# Patient Record
Sex: MALE | Race: WHITE | NOT HISPANIC OR LATINO | Employment: OTHER | URBAN - METROPOLITAN AREA
[De-identification: names, ages, dates, MRNs, and addresses within clinical notes are randomized per-mention and may not be internally consistent; named-entity substitution may affect disease eponyms.]

---

## 2015-10-28 LAB
ALBUMIN SERPL-MCNC: NORMAL G/DL
ANION GAP SERPL CALCULATED.3IONS-SCNC: NORMAL MMOL/L
BUN SERPL-MCNC: NORMAL MG/DL
CALCIUM SERPL-MCNC: NORMAL MG/DL
CHLORIDE SERPLBLD-SCNC: NORMAL MMOL/L
CO2 SERPL-SCNC: NORMAL MMOL/L
CREAT SERPL-MCNC: 0.84 MG/DL
GLUCOSE SERPL-MCNC: NORMAL MG/DL (ref 70–99)
PHOSPHATE SERPL-MCNC: NORMAL MG/DL
POTASSIUM SERPL-SCNC: NORMAL MMOL/L
SODIUM SERPL-SCNC: NORMAL MMOL/L

## 2016-01-25 LAB
CHOLEST SERPL-MCNC: 256 MG/DL
HBA1C MFR BLD: 7.3 % (ref 0–5.7)
HDLC SERPL-MCNC: 54 MG/DL
LDLC SERPL CALC-MCNC: 165 MG/DL
NONHDLC SERPL-MCNC: NORMAL MG/DL
PSA SERPL-MCNC: 1.12 NG/ML
TRIGL SERPL-MCNC: 184 MG/DL

## 2016-08-01 LAB
ALBUMIN SERPL-MCNC: 4 G/DL
ALP SERPL-CCNC: 66 U/L
ALT SERPL-CCNC: 18 U/L
ANION GAP SERPL CALCULATED.3IONS-SCNC: 8 MMOL/L
AST SERPL-CCNC: 16 U/L
BILIRUB SERPL-MCNC: 0.5 MG/DL
BILIRUBIN DIRECT: NORMAL MG/DL
BUN SERPL-MCNC: 12 MG/DL
CALCIUM SERPL-MCNC: 9.2 MG/DL
CHLORIDE SERPLBLD-SCNC: 100 MMOL/L
CO2 SERPL-SCNC: 29 MMOL/L
CREAT SERPL-MCNC: 0.82 MG/DL
ERYTHROCYTE [DISTWIDTH] IN BLOOD BY AUTOMATED COUNT: 12.7 %
ERYTHROCYTE [DISTWIDTH] IN BLOOD BY AUTOMATED COUNT: 12.7 %
GFR SERPL CREATININE-BSD FRML MDRD: >60 ML/MIN/1.73M2
GLUCOSE SERPL-MCNC: 191 MG/DL (ref 70–99)
HBA1C MFR BLD: 7.9 % (ref 0–5.7)
HCT VFR BLD AUTO: 40.7 %
HCT VFR BLD AUTO: 40.7 %
HEMOGLOBIN: 13.5 G/DL (ref 13.3–17.7)
HEMOGLOBIN: 13.5 G/DL (ref 13.3–17.7)
MCH RBC QN AUTO: 31.6 PG
MCH RBC QN AUTO: 31.6 PG
MCHC RBC AUTO-ENTMCNC: 33.2 G/DL
MCHC RBC AUTO-ENTMCNC: 33.2 G/DL
MCV RBC AUTO: 95 FL
MCV RBC AUTO: 95 FL
PLATELET # BLD AUTO: 224 10^9/L
PLATELET # BLD AUTO: 224 10^9/L
POTASSIUM SERPL-SCNC: 5 MMOL/L
PROT SERPL-MCNC: 7 G/DL
RBC # BLD AUTO: 4.27 10^12/L
RBC # BLD AUTO: 4.27 10^12/L
SODIUM SERPL-SCNC: 137 MMOL/L
WBC # BLD AUTO: 7.3 10^9/L
WBC # BLD AUTO: 7.3 10^9/L

## 2017-01-05 ENCOUNTER — TRANSFERRED RECORDS (OUTPATIENT)
Dept: CARDIOLOGY | Facility: CLINIC | Age: 72
End: 2017-01-05

## 2017-01-10 ENCOUNTER — TRANSFERRED RECORDS (OUTPATIENT)
Dept: CARDIOLOGY | Facility: CLINIC | Age: 72
End: 2017-01-10

## 2017-01-12 ENCOUNTER — MEDICAL CORRESPONDENCE (OUTPATIENT)
Dept: CARDIOLOGY | Facility: CLINIC | Age: 72
End: 2017-01-12

## 2017-01-12 ENCOUNTER — TRANSFERRED RECORDS (OUTPATIENT)
Dept: CARDIOLOGY | Facility: CLINIC | Age: 72
End: 2017-01-12

## 2017-01-13 ENCOUNTER — TELEPHONE (OUTPATIENT)
Dept: CARDIOLOGY | Facility: CLINIC | Age: 72
End: 2017-01-13

## 2017-01-13 NOTE — TELEPHONE ENCOUNTER
Pc from pt's wife that pt saw neurologist yest, Dr Hermes Schwartz [phone] and pt had symptoms of a stroke with visual impairment. The neurologist recommended that pt be seen by cardiology in next 7-10 to see if it was possibly due to a Fib. Wife states her  feels fine and his pulse does not feel irreg. She states she sees Dr Zuniga and was wondering if he had an openings. Transferred call to scheduling to arrange new pt appt for poss a fib. Wife expressed understanding. All questions answered

## 2017-01-17 ENCOUNTER — TELEPHONE (OUTPATIENT)
Dept: CARDIOLOGY | Facility: CLINIC | Age: 72
End: 2017-01-17

## 2017-01-18 ENCOUNTER — HOSPITAL ENCOUNTER (OUTPATIENT)
Dept: CARDIOLOGY | Facility: CLINIC | Age: 72
Discharge: HOME | End: 2017-01-18
Attending: INTERNAL MEDICINE | Admitting: INTERNAL MEDICINE
Payer: MEDICARE

## 2017-01-18 ENCOUNTER — OFFICE VISIT (OUTPATIENT)
Dept: CARDIOLOGY | Facility: CLINIC | Age: 72
End: 2017-01-18
Payer: COMMERCIAL

## 2017-01-18 ENCOUNTER — DOCUMENTATION ONLY (OUTPATIENT)
Dept: CARDIOLOGY | Facility: CLINIC | Age: 72
End: 2017-01-18

## 2017-01-18 VITALS
HEART RATE: 72 BPM | BODY MASS INDEX: 41.75 KG/M2 | WEIGHT: 315 LBS | DIASTOLIC BLOOD PRESSURE: 70 MMHG | HEIGHT: 73 IN | SYSTOLIC BLOOD PRESSURE: 158 MMHG

## 2017-01-18 DIAGNOSIS — I48.91 ATRIAL FIBRILLATION, UNSPECIFIED TYPE (H): Primary | ICD-10-CM

## 2017-01-18 DIAGNOSIS — I67.81 ACUTE CEREBROVASCULAR INSUFFICIENCY: ICD-10-CM

## 2017-01-18 DIAGNOSIS — I48.91 ATRIAL FIBRILLATION, UNSPECIFIED TYPE (H): ICD-10-CM

## 2017-01-18 PROCEDURE — 0296T ZIO PATCH HOLTER: CPT

## 2017-01-18 PROCEDURE — 0298T ZZC EXT ECG > 48HR TO 21 DAY REVIEW AND INTERPRETATN: CPT | Performed by: INTERNAL MEDICINE

## 2017-01-18 PROCEDURE — 93005 ELECTROCARDIOGRAM TRACING: CPT | Performed by: INTERNAL MEDICINE

## 2017-01-18 PROCEDURE — 99203 OFFICE O/P NEW LOW 30 MIN: CPT | Mod: 25 | Performed by: INTERNAL MEDICINE

## 2017-01-18 RX ORDER — METFORMIN HYDROCHLORIDE 750 MG/1
750 TABLET, EXTENDED RELEASE ORAL 2 TIMES DAILY WITH MEALS
COMMUNITY
End: 2019-01-28 | Stop reason: DRUGHIGH

## 2017-01-18 NOTE — PROGRESS NOTES
2017             Kemar Collins MD    [facility]   [address] [place] [address]       RE:    Jovi Aldana   MRN:  [id number]   :  1945      Dear Dr. Collins:      I had the pleasure to follow up with your patient, Mr. Jovi Aldana, in the Cardiovascular Medicine Clinic.  I am his wife's cardiologist, and he presents to establish local cardiac care.        As you recall, this is a 71-year-old gentleman with a longstanding history of hypertension, hyperlipidemia, venous insufficiency, sleep apnea, diabetes mellitus and obesity.  This gentleman was in his usual state of health until recently.  He had difficulty reading a computer screen and ultimately sought medical attention.  He went to an optometrist and his prescription for his eye glasses was normal.  He eventually saw a neurologist and testing had revealed unfortunately an acute small late acute infarct in the medial left temporal lobe involving the left hippocampus.  The neurologist had felt that this was responsible for his visual symptoms.      The patient is on apparently monotherapy for his blood pressure.  This consists of lisinopril 40 mg daily.  He has had a normal readings on his blood pressure in the 130s range for systolic when I review his clinic visits in the [facility] system.  He states that he has never gotten the number that was obtained in clinic today.  His blood pressure, by our measurement, was 210/86.  He is in the process of having this rechecked at the time of dictation.        An EKG is performed demonstrating sinus rhythm, right bundle branch block, no acute changes were noted.  His additional cardiac workup includes a nuclear myocardial perfusion study which was performed in [facility] system in 2015 demonstrating normal LV systolic function, however, no evidence of ischemia.  In addition to this, he has had workup with MRA of his neck and cerebral vasculature and no significant findings were  commented upon per report.      His neurologist had mentioned that he could have occult atrial fibrillation.  The patient has never felt any palpitations or any other symptoms and he presents to establish care and also to assess for any occult atrial fibrillation that may have occurred being responsible for his symptoms.  Also of note, the patient does have hyperlipidemia in the background.  He has tried 4 different statins and he unfortunately has had severe muscle aches and has been intolerant to all of them.      PAST MEDICAL HISTORY:   1.  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   2.  Normal myocardial perfusion study in 08/2015.   3.  Obesity.   4.  Sleep apnea.   5.  Venous insufficiency.   6.  Chronic lower extremity edema.   7.  Diabetes mellitus.   8.  Hyperlipidemia.      CURRENT MEDICATIONS CARDIAC FOCUS:   1.  Lasix 20 mg daily.   2.  Lisinopril 40 mg daily.   3.  Aspirin 81 mg daily.   4.  Niacin 250 mg with breakfast.   Please see additional noncardiac medications.      ALLERGIES:  Augmentin, statins, erythromycin and sulfa drugs.        SOCIAL HISTORY:  Retired, lives with his wife, lifelong nonsmoker, no alcohol intake.      FAMILY HISTORY:  Negative for premature coronary artery disease.      REVIEW OF SYSTEMS:  A 10-point review of systems is negative, otherwise as specified in the HPI.      PHYSICAL EXAMINATION:   VITAL SIGNS:  Blood pressure is 158/70 on repeat; the first blood pressure was 210/85.  Heart rate is 72.  Weight 368 pounds.   GENERAL:  The patient is alert, oriented, in no apparent distress.   HEENT:  Oropharynx clear, no sinus tenderness.   NECK:  No JVP, no carotid bruits.   CARDIOVASCULAR:  Distant heart tones.  Normal S1, S2.   LUNGS:  Coarse but clear.   ABDOMEN:  Soft, nontender, nondistended.   EXTREMITIES:  Warm to touch.      ASSESSMENT:   1.  Recent acute CVA with visual disturbance.   2.  The patient recently saw Neurology.  They  have asked to evaluate for possible atrial fibrillation.   3.  Hypertension.   4.  Obesity.   5.  Diabetes mellitus.      RECOMMENDATIONS:   1.  Recent acute cerebrovascular accident.  We agree with aspirin, antiplatelet therapy as well as aggressive blood pressure control.  His blood pressure has improved; however, not optimal.  He is on monotherapy currently.  I have asked him to purchase a blood pressure machine and provide us with additional data so that if need be, we can add an additional hypertensive medications.  We will also get an echocardiogram to assess for LV systolic function and valvular heart disease.  In addition to this, we will order a ZIO Patch monitor to assess for any atrial fibrillation at the request of his neurologist.   2.  Hyperlipidemia.  The patient has been intolerant to many statins.  We will get updated numbers.  He may be a candidate for a newer injectable form of hyperlipidemic therapy because controlling his cholesterol would be important given his comorbidities.  We will get updated numbers upon his next visit.   3.  The patient will purchase a blood pressure machine.  We will get diagnostic testing performed and have him return to clinic in 1 month's time.      It was a pleasure seeing him on your behalf.      Sincerely,         MD IDANIA Jewell MD             D: 2017 11:01   T: 2017 11:58   MT: [id number]      Name:     SAVI MALDONADO   MRN:      [id number]        Account:      [id number]   :      1945           Service Date: 2017      Document: [id number]

## 2017-01-18 NOTE — MR AVS SNAPSHOT
After Visit Summary   1/18/2017    Jovi Aldana    MRN: [id number]           Patient Information     Date Of Birth          1945        Visit Information        Provider Department      1/18/2017 10:15 AM Aren Zuniga MD [facility] HEART AT [place]        Today's Diagnoses     Atrial fibrillation, unspecified type (H)    -  1     Acute cerebrovascular insufficiency             Follow-ups after your visit        Additional Services     Follow-Up with Cardiologist                 Your next 10 appointments already scheduled     Jan 18, 2017 12:15 PM   ZIOPATCH MONITOR with RSCC DEVICE [facility])    [address]  [facility] [address]   [phone]           LOCATION - [id number] [place] [address], [address] [place] [address] **Please check-in at the [place] Registration Office, [address], in the [facility] building. When you are finished registering, please go to [address] and have a seat.            Jan 27, 2017  7:30 AM   Ech Complete with RSCCECHO2   [facility] ([facility])    [address]  [facility] [address]   [phone]           1.  Please bring or wear a comfortable two-piece outfit. 2.  You may eat, drink and take your normal medicines. 3.  For any questions that cannot be answered, please contact the ordering physician ***Please check-in at the [place] Registration Office located in [address] in the [facility] building. When you are finished registering, please go to [address] and have a seat. The technician will call your name for the test.            Jan 27, 2017  8:30 AM   LAB with RU LAB   [facility] HEART AT [place] ([facility])    [address]  [facility] [address]   [phone]           Patient must bring picture  ID.  Patient should be prepared to give a urine specimen  Please do not eat 10-12 hours before your appointment if you are coming in fasting for labs on lipids, cholesterol, or glucose (sugar).  Pregnant women should follow their Care Team instructions. Water with medications is okay. Do not drink coffee or other fluids.   If you have concerns about taking  your medications, please ask at office or if scheduling via [employer], send a message by clicking on Secure Messaging, Message Your Care Team.            Feb 06, 2017  2:15 PM   Return Visit with Aren Zuniga MD   [facility] AT [place] ([facility])    [address] [place] [address]   [phone]              Future tests that were ordered for you today     Open Future Orders        Priority Expected Expires Ordered    Lipid Profile Routine 2/17/2017 1/18/2018 1/18/2017    ALT Routine 2/17/2017 1/18/2018 1/18/2017    Basic metabolic panel Routine 2/17/2017 1/18/2018 1/18/2017    Echocardiogram Routine 2/17/2017 1/18/2018 1/18/2017    Follow-Up with Cardiologist Routine 2/17/2017 1/18/2018 1/18/2017    Zio Patch Monitor Routine 1/18/2017 1/18/2018 1/18/2017            Who to contact     If you have questions or need follow up information about today's clinic visit or your schedule please contact [facility] AT [place] directly at [phone].  Normal or non-critical lab and imaging results will be communicated to you by MyChart, letter or phone within 4 business days after the clinic has received the results. If you do not hear from us within 7 days, please contact the clinic through MyChart or phone. If you have a critical or abnormal lab result, we will notify you by phone as soon as possible.  Submit refill requests through [employer] or call your pharmacy and they will forward the refill request to us. Please allow 3 business days for your refill to be completed.  "         Additional Information About Your Visit        Care EveryWhere ID     This is your Care EveryWhere ID. This could be used by other organizations to access your [place] medical records  [id number]        Your Vitals Were     Pulse Height BMI (Body Mass Index)             72 1.854 m (6' 1\") 48.56 kg/m2          Blood Pressure from Last 3 Encounters:   01/18/17 158/70   06/12/13 185/83   05/15/12 139/68    Weight from Last 3 Encounters:   01/18/17 166.924 kg (368 lb)   06/12/13 185.068 kg (408 lb)   05/15/12 180.532 kg (398 lb)              We Performed the Following     EKG 12-lead complete w/read - Clinics (performed today)        Primary Care Provider Office Phone # Fax #    Kemar Collins [phone]       [address] [address]        Thank you!     Thank you for choosing [facility] AT [place]  for your care. Our goal is always to provide you with excellent care. Hearing back from our patients is one way we can continue to improve our services. Please take a few minutes to complete the written survey that you may receive in the mail after your visit with us. Thank you!             Your Updated Medication List - Protect others around you: Learn how to safely use, store and throw away your medicines at www.disposemymeds.org.          This list is accurate as of: 1/18/17 11:43 AM.  Always use your most recent med list.                   Brand Name Dispense Instructions for use    ALLEGRA 180 MG tablet   Generic drug:  fexofenadine      Take 1 tablet by mouth daily.       clobetasol 0.05 % ointment    TEMOVATE     Apply  topically 2 times daily.       DESONIL CREAM EX      Externally apply 0.05 oz topically. Taro       furosemide 20 MG tablet    LASIX     Take 20 mg by mouth daily.       GABAPENTIN PO      Take  by mouth. 600mg am/ 300mg lunch and 600mg bedtime       JANUVIA 100 MG tablet   Generic drug:  " sitagliptin      Take 100 mg by mouth       lisinopril 20 MG tablet    PRINIVIL/ZESTRIL     Take 40 mg by mouth daily       LOW-DOSE ASPIRIN PO      Take 1 tablet by mouth daily.       magnesium 250 MG tablet      Take 1 tablet by mouth daily.       metFORMIN 750 MG 24 hr tablet    GLUCOPHAGE-XR     Take 750 mg by mouth 2 times daily (with meals)       niacin 250 MG tablet      Take 1 tablet by mouth daily (with breakfast).       OMEGA-3 FISH OIL PO      Take 1 tablet by mouth daily.       potassium 99 MG Tabs      Take 1 tablet by mouth daily.       PSYLLIUM HUSK      500 mg daily.       TEGRETOL XR PO      Take  by mouth. 200mg am/ 100 mg for supper and bedtime       TMC      .25 %Menthol / 0.5 % pheno       UNABLE TO FIND      2 times daily. [place] 1:2       VITAMIN B-12 PO      Take  by mouth.       * vitamin E 400 UNITS Tabs      Take 1 tablet by mouth daily.       * VITAMIN E PO      Take 4,000 Units by mouth daily.       * Notice:  This list has 2 medication(s) that are the same as other medications prescribed for you. Read the directions carefully, and ask your doctor or other care provider to review them with you.

## 2017-01-18 NOTE — TELEPHONE ENCOUNTER
Records received from [facility].  Labs enterd and Record sent to HIM for scanning.

## 2017-01-18 NOTE — PROGRESS NOTES
Note dictated.  He will buy BP machine and keep diary.  Recheck BP better, but need tighter control.      Aren Zuniga MD

## 2017-01-27 ENCOUNTER — HOSPITAL ENCOUNTER (OUTPATIENT)
Dept: CARDIOLOGY | Facility: CLINIC | Age: 72
Discharge: HOME | End: 2017-01-27
Attending: INTERNAL MEDICINE | Admitting: INTERNAL MEDICINE
Payer: MEDICARE

## 2017-01-27 DIAGNOSIS — I48.91 ATRIAL FIBRILLATION, UNSPECIFIED TYPE (H): ICD-10-CM

## 2017-01-27 PROCEDURE — 25500064 ZZH RX 255 OP 636: Performed by: INTERNAL MEDICINE

## 2017-01-27 PROCEDURE — 93306 TTE W/DOPPLER COMPLETE: CPT | Mod: 26 | Performed by: INTERNAL MEDICINE

## 2017-01-27 PROCEDURE — 40000264 ECHO COMPLETE WITH OPTISON

## 2017-01-27 RX ADMIN — HUMAN ALBUMIN MICROSPHERES AND PERFLUTREN 3 ML: 10; .22 INJECTION, SOLUTION INTRAVENOUS at 08:45

## 2017-01-30 ENCOUNTER — DOCUMENTATION ONLY (OUTPATIENT)
Dept: CARDIOLOGY | Facility: CLINIC | Age: 72
End: 2017-01-30

## 2017-01-30 DIAGNOSIS — I10 ESSENTIAL HYPERTENSION: ICD-10-CM

## 2017-01-30 DIAGNOSIS — I10 ESSENTIAL HYPERTENSION: Primary | ICD-10-CM

## 2017-01-30 LAB
ALT SERPL W P-5'-P-CCNC: 25 U/L (ref 0–70)
ANION GAP SERPL CALCULATED.3IONS-SCNC: 5 MMOL/L (ref 3–14)
BUN SERPL-MCNC: 12 MG/DL (ref 7–30)
CALCIUM SERPL-MCNC: 8.6 MG/DL (ref 8.5–10.1)
CHLORIDE SERPL-SCNC: 100 MMOL/L (ref 94–109)
CHOLEST SERPL-MCNC: 270 MG/DL
CO2 SERPL-SCNC: 32 MMOL/L (ref 20–32)
CREAT SERPL-MCNC: 0.71 MG/DL (ref 0.66–1.25)
GFR SERPL CREATININE-BSD FRML MDRD: ABNORMAL ML/MIN/1.7M2
GLUCOSE SERPL-MCNC: 160 MG/DL (ref 70–99)
HDLC SERPL-MCNC: 56 MG/DL
LDLC SERPL CALC-MCNC: 184 MG/DL
NONHDLC SERPL-MCNC: 214 MG/DL
POTASSIUM SERPL-SCNC: 4.5 MMOL/L (ref 3.4–5.3)
SODIUM SERPL-SCNC: 137 MMOL/L (ref 133–144)
TRIGL SERPL-MCNC: 150 MG/DL

## 2017-01-30 PROCEDURE — 36415 COLL VENOUS BLD VENIPUNCTURE: CPT | Performed by: INTERNAL MEDICINE

## 2017-01-30 PROCEDURE — 80061 LIPID PANEL: CPT | Performed by: INTERNAL MEDICINE

## 2017-01-30 PROCEDURE — 84460 ALANINE AMINO (ALT) (SGPT): CPT | Performed by: INTERNAL MEDICINE

## 2017-01-30 PROCEDURE — 80048 BASIC METABOLIC PNL TOTAL CA: CPT | Performed by: INTERNAL MEDICINE

## 2017-01-30 NOTE — PROGRESS NOTES
Ordered entered for BMP, Lipids/alt.  Pt here in clinic on 1/27 for echo and labs.  Labs drawn off IV or echo.  Specimen hemolyzed.  Pt notified and will return to clinic for draw.  Pt has f/u OV with Dr. Zuniga on 2/6/2017.   KMortimer RN

## 2017-02-06 ENCOUNTER — OFFICE VISIT (OUTPATIENT)
Dept: CARDIOLOGY | Facility: CLINIC | Age: 72
End: 2017-02-06
Attending: INTERNAL MEDICINE
Payer: COMMERCIAL

## 2017-02-06 ENCOUNTER — CARE COORDINATION (OUTPATIENT)
Dept: CARDIOLOGY | Facility: CLINIC | Age: 72
End: 2017-02-06

## 2017-02-06 VITALS
BODY MASS INDEX: 41.75 KG/M2 | HEIGHT: 73 IN | WEIGHT: 315 LBS | HEART RATE: 80 BPM | SYSTOLIC BLOOD PRESSURE: 174 MMHG | DIASTOLIC BLOOD PRESSURE: 78 MMHG

## 2017-02-06 DIAGNOSIS — I48.91 ATRIAL FIBRILLATION, UNSPECIFIED TYPE (H): ICD-10-CM

## 2017-02-06 DIAGNOSIS — I11.9 HYPERTENSIVE HEART DISEASE WITHOUT HEART FAILURE: ICD-10-CM

## 2017-02-06 DIAGNOSIS — I25.10 CORONARY ARTERY DISEASE INVOLVING NATIVE CORONARY ARTERY OF NATIVE HEART WITHOUT ANGINA PECTORIS: ICD-10-CM

## 2017-02-06 DIAGNOSIS — I10 ESSENTIAL HYPERTENSION: ICD-10-CM

## 2017-02-06 DIAGNOSIS — E78.2 MIXED HYPERLIPIDEMIA: Primary | ICD-10-CM

## 2017-02-06 PROCEDURE — 99214 OFFICE O/P EST MOD 30 MIN: CPT | Performed by: INTERNAL MEDICINE

## 2017-02-06 RX ORDER — CARBAMAZEPINE 200 MG/1
200 TABLET ORAL 3 TIMES DAILY
COMMUNITY
End: 2020-05-05

## 2017-02-06 RX ORDER — GABAPENTIN 300 MG/1
300 CAPSULE ORAL 4 TIMES DAILY
COMMUNITY
End: 2020-10-08

## 2017-02-06 NOTE — PROGRESS NOTES
Educated patient and his wife on PCSK-9 inhibitor (Repatha) for dx of hyperlipidemia. Last Lipid panel on 1/30/17 revealed LDL level of 184. I discussed the difference between statins and PCSK-9 inhibtor and how it can help prevent a future cardiovascular event, method (sure click injection) and how often it is administered, cost and the most common side effect. Pt was concerned about co-pay cost once medication is approved, I discussed the [employer] and [employer] for financial assistance.  All questions were answered. Pt was given educational pamphlets. Pt said he would like to read the material before making up his mind. I told him I would touch base with him next week. I gave his wife (Jan) Team 1 nurse line number. Prescription was sent to  Specialty pharmacy, will await response of approval or denial.

## 2017-02-06 NOTE — PROGRESS NOTES
Cardiology     I had the pleasure to follow up with your patient, Mr. Jovi Aldaan, in the Cardiovascular Medicine Clinic.  I am his wife's cardiologist, and he presents to establish local cardiac care.        As you recall, this is a 71-year-old gentleman with a longstanding history of hypertension, hyperlipidemia, venous insufficiency, sleep apnea, diabetes mellitus and obesity.  This gentleman was in his usual state of health until recently.  He had difficulty reading a computer screen and ultimately sought medical attention.  He went to an optometrist and his prescription for his eye glasses was normal.  He eventually saw a neurologist and testing had revealed unfortunately an acute small late acute infarct in the medial left temporal lobe involving the left hippocampus.  The neurologist had felt that this was responsible for his visual symptoms.      The patient is on apparently monotherapy for his blood pressure.  This consists of lisinopril 40 mg daily.  He has had a normal readings on his blood pressure in the 130s range for systolic when I review his clinic visits in the [employer] system.  He states that he has never gotten the number that was obtained in clinic today.  His blood pressure, by our measurement, was 210/86.  He is in the process of having this rechecked at the time of dictation.        An EKG is performed demonstrating sinus rhythm, right bundle branch block, no acute changes were noted.  His additional cardiac workup includes a nuclear myocardial perfusion study which was performed in [employer] system in 08/2015 demonstrating normal LV systolic function, however, no evidence of ischemia.  In addition to this, he has had workup with MRA of his neck and cerebral vasculature and no significant findings were commented upon per report.      His neurologist had mentioned that he could have occult atrial fibrillation.  The patient has never felt any palpitations or any other symptoms and he presents to  "establish care and also to assess for any occult atrial fibrillation that may have occurred being responsible for his symptoms.  Also of note, the patient does have hyperlipidemia in the background.  He has tried 4 different statins and he unfortunately has had severe muscle aches and has been intolerant to all of them.      ZIO patch was normal    Echo normal        PAST MEDICAL HISTORY:   1.  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   2.  Normal myocardial perfusion study in 08/2015.   3.  Obesity.   4.  Sleep apnea.   5.  Venous insufficiency.   6.  Chronic lower extremity edema.   7.  Diabetes mellitus.   8.  Hyperlipidemia.      CURRENT MEDICATIONS CARDIAC FOCUS:   1.  Lasix 20 mg daily.   2.  Lisinopril 40 mg daily.   3.  Aspirin 81 mg daily.   4.  Niacin 250 mg with breakfast.   Please see additional noncardiac medications.      ALLERGIES:  Augmentin, statins, erythromycin and sulfa drugs.        PHYSICAL EXAMINATION:   VITAL SIGNS:  Blood pressure 174/78, pulse 80, height 1.854 m (6' 1\"), weight 169.101 kg (372 lb 12.8 oz).  GENERAL:  The patient is alert, oriented, in no apparent distress.   HEENT:  Oropharynx clear, no sinus tenderness.   NECK:  No JVP, no carotid bruits.   CARDIOVASCULAR:  Distant heart tones.  Normal S1, S2.   LUNGS:  Coarse but clear.   ABDOMEN:  Soft, nontender, nondistended.   EXTREMITIES:  Warm to touch.      ASSESSMENT:   1.  Recent acute CVA with visual disturbance.   2.  The patient recently saw Neurology.  They have asked to evaluate for possible atrial fibrillation.   3.  Hypertension. Possible white coat  4.  Obesity.   5.  Diabetes mellitus.      RECOMMENDATIONS:   1.  Recent acute cerebrovascular accident.  We agree with aspirin, antiplatelet therapy as well as aggressive blood pressure control.  BP at home ~ 140`s systolic.  His BP machine is similar to ours.  Suggests possible white coat syndrome.    2.  Hyperlipidemia.  The patient " has been intolerant to many statins.  We will try Repatha and recheck numbers in ~ 6 months.    3.  RTC ~ 6 months.        Aren Zuniga MD

## 2017-02-06 NOTE — Clinical Note
2/6/2017    Kemar Collins MD  [facility]   [address] [address]    RE: Jovi Aldana     Dear Colleague,    I had the pleasure of seeing Jovi Aldana in the [facility].  Cardiology     I had the pleasure to follow up with your patient, Mr. Jovi Aldana, in the Cardiovascular Medicine Clinic.  I am his wife's cardiologist, and he presents to establish local cardiac care.        As you recall, this is a 71-year-old gentleman with a longstanding history of hypertension, hyperlipidemia, venous insufficiency, sleep apnea, diabetes mellitus and obesity.  This gentleman was in his usual state of health until recently.  He had difficulty reading a computer screen and ultimately sought medical attention.  He went to an optometrist and his prescription for his eye glasses was normal.  He eventually saw a neurologist and testing had revealed unfortunately an acute small late acute infarct in the medial left temporal lobe involving the left hippocampus.  The neurologist had felt that this was responsible for his visual symptoms.      The patient is on apparently monotherapy for his blood pressure.  This consists of lisinopril 40 mg daily.  He has had a normal readings on his blood pressure in the 130s range for systolic when I review his clinic visits in the [employer] system.  He states that he has never gotten the number that was obtained in clinic today.  His blood pressure, by our measurement, was 210/86.  He is in the process of having this rechecked at the time of dictation.        An EKG is performed demonstrating sinus rhythm, right bundle branch block, no acute changes were noted.  His additional cardiac workup includes a nuclear myocardial perfusion study which was performed in [facility] in 08/2015 demonstrating normal LV systolic function, however, no evidence of ischemia.  In addition to this, he has had workup with MRA of his  "neck and cerebral vasculature and no significant findings were commented upon per report.      His neurologist had mentioned that he could have occult atrial fibrillation.  The patient has never felt any palpitations or any other symptoms and he presents to establish care and also to assess for any occult atrial fibrillation that may have occurred being responsible for his symptoms.    Also of note, the patient does have hyperlipidemia in the background.  He has tried 4 different statins and he unfortunately has had severe muscle aches and has been intolerant to all of them.      ZIO patch was normal    Echo normal    PAST MEDICAL HISTORY:   1.  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   2.  Normal myocardial perfusion study in 08/2015.   3.  Obesity.   4.  Sleep apnea.   5.  Venous insufficiency.   6.  Chronic lower extremity edema.   7.  Diabetes mellitus.   8.  Hyperlipidemia.      CURRENT MEDICATIONS CARDIAC FOCUS:   1.  Lasix 20 mg daily.   2.  Lisinopril 40 mg daily.   3.  Aspirin 81 mg daily.   4.  Niacin 250 mg with breakfast.   Please see additional noncardiac medications.      ALLERGIES:  Augmentin, statins, erythromycin and sulfa drugs.        PHYSICAL EXAMINATION:   VITAL SIGNS:  Blood pressure 174/78, pulse 80, height 1.854 m (6' 1\"), weight 169.101 kg (372 lb 12.8 oz).  GENERAL:  The patient is alert, oriented, in no apparent distress.   HEENT:  Oropharynx clear, no sinus tenderness.   NECK:  No JVP, no carotid bruits.   CARDIOVASCULAR:  Distant heart tones.  Normal S1, S2.   LUNGS:  Coarse but clear.   ABDOMEN:  Soft, nontender, nondistended.   EXTREMITIES:  Warm to touch.      ASSESSMENT:   1.  Recent acute CVA with visual disturbance.   2.  The patient recently saw Neurology.  They have asked to evaluate for possible atrial fibrillation.   3.  Hypertension. Possible white coat  4.  Obesity.   5.  Diabetes mellitus.      RECOMMENDATIONS:   1.  Recent acute " cerebrovascular accident.  We agree with aspirin, antiplatelet therapy as well as aggressive blood pressure control.  BP at home ~ 140`s systolic.  His BP machine is similar to ours.  Suggests possible white coat syndrome.    2.  Hyperlipidemia.  The patient has been intolerant to many statins.  We will try Repatha and recheck numbers in ~ 6 months.    3.  RTC ~ 6 months.    Thank you for allowing me to participate in the care of your patient.    Sincerely,     Aren Zuniga MD     [facility]

## 2017-08-09 ENCOUNTER — OFFICE VISIT (OUTPATIENT)
Dept: CARDIOLOGY | Facility: CLINIC | Age: 72
End: 2017-08-09
Attending: INTERNAL MEDICINE
Payer: COMMERCIAL

## 2017-08-09 VITALS
BODY MASS INDEX: 41.75 KG/M2 | HEART RATE: 84 BPM | HEIGHT: 73 IN | DIASTOLIC BLOOD PRESSURE: 78 MMHG | SYSTOLIC BLOOD PRESSURE: 162 MMHG | WEIGHT: 315 LBS

## 2017-08-09 DIAGNOSIS — I11.9 HYPERTENSIVE HEART DISEASE WITHOUT HEART FAILURE: ICD-10-CM

## 2017-08-09 DIAGNOSIS — I10 BENIGN ESSENTIAL HYPERTENSION: Primary | ICD-10-CM

## 2017-08-09 DIAGNOSIS — I48.91 ATRIAL FIBRILLATION, UNSPECIFIED TYPE (H): ICD-10-CM

## 2017-08-09 LAB
ALT SERPL W P-5'-P-CCNC: 26 U/L (ref 0–70)
CHOLEST SERPL-MCNC: 281 MG/DL
HDLC SERPL-MCNC: 66 MG/DL
LDLC SERPL CALC-MCNC: 178 MG/DL
NONHDLC SERPL-MCNC: 215 MG/DL
TRIGL SERPL-MCNC: 185 MG/DL

## 2017-08-09 PROCEDURE — 99214 OFFICE O/P EST MOD 30 MIN: CPT | Performed by: INTERNAL MEDICINE

## 2017-08-09 PROCEDURE — 36415 COLL VENOUS BLD VENIPUNCTURE: CPT | Performed by: INTERNAL MEDICINE

## 2017-08-09 PROCEDURE — 80061 LIPID PANEL: CPT | Performed by: INTERNAL MEDICINE

## 2017-08-09 PROCEDURE — 84460 ALANINE AMINO (ALT) (SGPT): CPT | Performed by: INTERNAL MEDICINE

## 2017-08-09 RX ORDER — SPIRONOLACTONE 25 MG/1
12.5 TABLET ORAL DAILY
Qty: 45 TABLET | Refills: 3 | Status: SHIPPED | OUTPATIENT
Start: 2017-08-09 | End: 2018-07-27

## 2017-08-09 RX ORDER — LISINOPRIL 40 MG/1
40 TABLET ORAL DAILY
COMMUNITY
End: 2020-06-15

## 2017-08-09 RX ORDER — OMEPRAZOLE 40 MG/1
40 CAPSULE, DELAYED RELEASE ORAL DAILY
COMMUNITY
End: 2018-04-26

## 2017-08-09 NOTE — LETTER
8/9/2017    Kemar Collins MD  Formerly [facility]   [address] [facility] [address]    RE: Jovi Aldana       Dear Colleague,    I had the pleasure of seeing Danieniurka Aldana in the [facility].    Cardiology Progress Note          Assessment and Plan:     1. Hypertension, suboptimal control    Discussed how this can contribute to small vessel brain disease and likelihood of stroke even without atherosclerotic disease.    We'll try a small dose of spironolactone 12.5 mg daily.  He will cut his potassium supplementation in half and get basic metabolic panel checked with his primary in one week.  Continue the furosemide and lisinopril.  Discussed the potential side effects of gynecomastia and tenderness.    Would not use the amlodipine as patient already has symptomatic lower extremity edema.  If he does not tolerate the spironolactone, could consider addition of alpha blockade like Cardura which may allow him to tolerate more furosemide.      2. Brief SVT    No clear evidence of paroxysmal atrial fibrillation, but patient is high risk for developing this in the future.  If he develops future strokes and/or evidence of likely embolic phenomenon, would consider anticoagulation while trying to identify the atrial fibrillation.      3. Obstructive sleep apnea, compliant with CPAP    This note was transcribed using electronic voice recognition software and there may be typographical errors present.                Interval History:   The patient is a pleasant 72 year old whom I'm meeting for the first time along with his wife.  Despite being morbidly obese, he has not had any significant atherosclerotic disease on multiple evaluations including CT coronary angiography.  He is intolerant of all statins.  He could not afford Repatha.  He also could not tolerate Livalo. His symptoms are myalgias.  He has lost about 50 pounds over the past couple years through  "improvement in his diet.  He had a neurologic event with visual field changes that might have been either a small vessel infarct or embolic phenomenon.  Seven-day monitor did not show any atrial fibrillation, just brief SVT.   The patient has had hypertension with no blood pressures under 140 really.  With any stress or activity, his pressures spike up to as high as 200 systolic.  The patient only urinates once per night.  He is compliant with his CPAP.  When he did try a higher strength of furosemide, he did have difficulty with frequent urination, however.                       Review of Systems:   Review of Systems:  Skin:  Positive for     Eyes:  Positive for glasses  ENT:  Positive for sinus trouble  Respiratory:  Positive for dyspnea on exertion;sleep apnea;CPAP  Cardiovascular:    edema;Positive for  Gastroenterology: Positive for reflux  Genitourinary:  Negative    Musculoskeletal:  Positive for arthritis;back pain;joint pain;joint stiffness;foot pain  Neurologic:  Positive for stroke;numbness or tingling of hands;numbness or tingling of feet  Psychiatric:  Positive for anxiety  Heme/Lymph/Imm:  Positive for allergies  Endocrine:  Positive for diabetes              Physical Exam:     Vitals: /78 (BP Location: Right arm, Patient Position: Sitting, Cuff Size: Adult Large)  Pulse 84  Ht 1.854 m (6' 1\")  Wt (!) 166.8 kg (367 lb 12.8 oz)  BMI 48.53 kg/m2  Constitutional:  cooperative, alert and oriented, well developed, well nourished, in no acute distress morbidly obese      Skin:  warm and dry to the touch, no apparent skin lesions or masses noted        Head:  normocephalic, no masses or lesions        Eyes:  pupils equal and round, conjunctivae and lids unremarkable, sclera white, no xanthalasma, EOMS intact, no nystagmus        ENT:  no pallor or cyanosis, dentition good        Neck:  JVP normal        Chest:  normal breath sounds, clear to auscultation, normal A-P diameter, normal symmetry, normal " respiratory excursion, no use of accessory muscles        Cardiac: regular rhythm;no murmurs, gallops or rubs detected   distant heart sounds              Abdomen:    obese      Extremities and Back:  no deformities, clubbing, cyanosis, erythema observed        Neurological:  affect appropriate, oriented to time, person and place;no gross motor deficits                 Medications:     Current Outpatient Prescriptions   Medication Sig Dispense Refill     omeprazole (PRILOSEC) 40 MG capsule Take 40 mg by mouth daily       lisinopril (PRINIVIL/ZESTRIL) 40 MG tablet Take 40 mg by mouth daily       spironolactone (ALDACTONE) 25 MG tablet Take 0.5 tablets (12.5 mg) by mouth daily 45 tablet 3     gabapentin (NEURONTIN) 300 MG capsule Take 300 mg by mouth 4 times daily       carBAMazepine (TEGRETOL) 200 MG tablet Take 200 mg by mouth 3 times daily       aspirin 81 MG tablet Take 81 mg by mouth daily       metFORMIN (GLUCOPHAGE-XR) 750 MG 24 hr tablet Take 750 mg by mouth 2 times daily (with meals)       sitagliptin (JANUVIA) 100 MG tablet Take 100 mg by mouth       Furosemide (LASIX) 20 MG tablet Take 20 mg by mouth daily.       fexofenadine (ALLEGRA) 180 MG tablet Take 1 tablet by mouth daily.       vitamin E 400 UNITS TABS Take 1 tablet by mouth daily.       potassium 99 MG TABS Take 1 tablet by mouth daily.       niacin 250 MG tablet Take 1 tablet by mouth daily (with breakfast).       magnesium 250 MG tablet Take 1 tablet by mouth daily.       PSYLLIUM HUSK 500 mg as needed        clobetasol (TEMOVATE) 0.05 % ointment Apply topically as needed        Desonide Crea-Wound Dress Crea (DESONIL CREAM EX) Externally apply 0.05 oz topically as needed Taro        TMC .25 %Menthol / 0.5 % pheno                  Data:   All laboratory data reviewed  Results for orders placed or performed in visit on 08/09/17 (from the past 24 hour(s))   Lipid Profile   Result Value Ref Range    Cholesterol 281 (H) <200 mg/dL    Triglycerides 185  (H) <150 mg/dL    HDL Cholesterol 66 >39 mg/dL    LDL Cholesterol Calculated 178 (H) <100 mg/dL    Non HDL Cholesterol 215 (H) <130 mg/dL   ALT   Result Value Ref Range    ALT 26 0 - 70 U/L       All laboratory data reviewed  Lab Results   Component Value Date    CHOL 281 08/09/2017     Lab Results   Component Value Date    HDL 66 08/09/2017     Lab Results   Component Value Date     08/09/2017     Lab Results   Component Value Date    TRIG 185 08/09/2017     No results found for: CHOLHDLRATIO  TSH   Date Value Ref Range Status   03/01/2013 1.58 0.4 - 5.0 mU/L Final     Last Basic Metabolic Panel:  Lab Results   Component Value Date     01/30/2017      Lab Results   Component Value Date    POTASSIUM 4.5 01/30/2017     Lab Results   Component Value Date    CHLORIDE 100 01/30/2017     Lab Results   Component Value Date    RULA 8.6 01/30/2017     Lab Results   Component Value Date    CO2 32 01/30/2017     Lab Results   Component Value Date    BUN 12 01/30/2017     Lab Results   Component Value Date    CR 0.71 01/30/2017     Lab Results   Component Value Date     01/30/2017     Lab Results   Component Value Date    WBC 7.3 08/01/2016    WBC 7.3 08/01/2016     Lab Results   Component Value Date    RBC 4.27 08/01/2016    RBC 4.27 08/01/2016     Lab Results   Component Value Date    HGB 13.5 08/01/2016    HGB 13.5 08/01/2016     Lab Results   Component Value Date    HCT 40.7 08/01/2016    HCT 40.7 08/01/2016     Lab Results   Component Value Date    MCV 95 08/01/2016    MCV 95 08/01/2016     Lab Results   Component Value Date    MCH 31.6 08/01/2016    MCH 31.6 08/01/2016     Lab Results   Component Value Date    MCHC 33.2 08/01/2016    MCHC 33.2 08/01/2016     Lab Results   Component Value Date    RDW 12.7 08/01/2016    RDW 12.7 08/01/2016     Lab Results   Component Value Date     08/01/2016     08/01/2016       Thank you for allowing me to participate in the care of your  patient.    Sincerely,     Liang Silverio MD     [facility]

## 2017-08-09 NOTE — PROGRESS NOTES
Cardiology Progress Note          Assessment and Plan:     1. Hypertension, suboptimal control    Discussed how this can contribute to small vessel brain disease and likelihood of stroke even without atherosclerotic disease.    We'll try a small dose of spironolactone 12.5 mg daily.  He will cut his potassium supplementation in half and get basic metabolic panel checked with his primary in one week.  Continue the furosemide and lisinopril.  Discussed the potential side effects of gynecomastia and tenderness.    Would not use the amlodipine as patient already has symptomatic lower extremity edema.  If he does not tolerate the spironolactone, could consider addition of alpha blockade like Cardura which may allow him to tolerate more furosemide.      2. Brief SVT    No clear evidence of paroxysmal atrial fibrillation, but patient is high risk for developing this in the future.  If he develops future strokes and/or evidence of likely embolic phenomenon, would consider anticoagulation while trying to identify the atrial fibrillation.      3. Obstructive sleep apnea, compliant with CPAP    This note was transcribed using electronic voice recognition software and there may be typographical errors present.                Interval History:   The patient is a pleasant 72 year old whom I'm meeting for the first time along with his wife.  Despite being morbidly obese, he has not had any significant atherosclerotic disease on multiple evaluations including CT coronary angiography.  He is intolerant of all statins.  He could not afford Repatha.  He also could not tolerate Livalo. His symptoms are myalgias.  He has lost about 50 pounds over the past couple years through improvement in his diet.  He had a neurologic event with visual field changes that might have been either a small vessel infarct or embolic phenomenon.  Seven-day monitor did not show any atrial fibrillation, just brief SVT.   The patient has had hypertension with  "no blood pressures under 140 really.  With any stress or activity, his pressures spike up to as high as 200 systolic.  The patient only urinates once per night.  He is compliant with his CPAP.  When he did try a higher strength of furosemide, he did have difficulty with frequent urination, however.                       Review of Systems:   Review of Systems:  Skin:  Positive for     Eyes:  Positive for glasses  ENT:  Positive for sinus trouble  Respiratory:  Positive for dyspnea on exertion;sleep apnea;CPAP  Cardiovascular:    edema;Positive for  Gastroenterology: Positive for reflux  Genitourinary:  Negative    Musculoskeletal:  Positive for arthritis;back pain;joint pain;joint stiffness;foot pain  Neurologic:  Positive for stroke;numbness or tingling of hands;numbness or tingling of feet  Psychiatric:  Positive for anxiety  Heme/Lymph/Imm:  Positive for allergies  Endocrine:  Positive for diabetes              Physical Exam:     Vitals: /78 (BP Location: Right arm, Patient Position: Sitting, Cuff Size: Adult Large)  Pulse 84  Ht 1.854 m (6' 1\")  Wt (!) 166.8 kg (367 lb 12.8 oz)  BMI 48.53 kg/m2  Constitutional:  cooperative, alert and oriented, well developed, well nourished, in no acute distress morbidly obese      Skin:  warm and dry to the touch, no apparent skin lesions or masses noted        Head:  normocephalic, no masses or lesions        Eyes:  pupils equal and round, conjunctivae and lids unremarkable, sclera white, no xanthalasma, EOMS intact, no nystagmus        ENT:  no pallor or cyanosis, dentition good        Neck:  JVP normal        Chest:  normal breath sounds, clear to auscultation, normal A-P diameter, normal symmetry, normal respiratory excursion, no use of accessory muscles        Cardiac: regular rhythm;no murmurs, gallops or rubs detected   distant heart sounds              Abdomen:    obese      Extremities and Back:  no deformities, clubbing, cyanosis, erythema observed    "     Neurological:  affect appropriate, oriented to time, person and place;no gross motor deficits                 Medications:     Current Outpatient Prescriptions   Medication Sig Dispense Refill     omeprazole (PRILOSEC) 40 MG capsule Take 40 mg by mouth daily       lisinopril (PRINIVIL/ZESTRIL) 40 MG tablet Take 40 mg by mouth daily       spironolactone (ALDACTONE) 25 MG tablet Take 0.5 tablets (12.5 mg) by mouth daily 45 tablet 3     gabapentin (NEURONTIN) 300 MG capsule Take 300 mg by mouth 4 times daily       carBAMazepine (TEGRETOL) 200 MG tablet Take 200 mg by mouth 3 times daily       aspirin 81 MG tablet Take 81 mg by mouth daily       metFORMIN (GLUCOPHAGE-XR) 750 MG 24 hr tablet Take 750 mg by mouth 2 times daily (with meals)       sitagliptin (JANUVIA) 100 MG tablet Take 100 mg by mouth       Furosemide (LASIX) 20 MG tablet Take 20 mg by mouth daily.       fexofenadine (ALLEGRA) 180 MG tablet Take 1 tablet by mouth daily.       vitamin E 400 UNITS TABS Take 1 tablet by mouth daily.       potassium 99 MG TABS Take 1 tablet by mouth daily.       niacin 250 MG tablet Take 1 tablet by mouth daily (with breakfast).       magnesium 250 MG tablet Take 1 tablet by mouth daily.       PSYLLIUM HUSK 500 mg as needed        clobetasol (TEMOVATE) 0.05 % ointment Apply topically as needed        Desonide Crea-Wound Dress Crea (DESONIL CREAM EX) Externally apply 0.05 oz topically as needed Taro        TMC .25 %Menthol / 0.5 % pheno                  Data:   All laboratory data reviewed  Results for orders placed or performed in visit on 08/09/17 (from the past 24 hour(s))   Lipid Profile   Result Value Ref Range    Cholesterol 281 (H) <200 mg/dL    Triglycerides 185 (H) <150 mg/dL    HDL Cholesterol 66 >39 mg/dL    LDL Cholesterol Calculated 178 (H) <100 mg/dL    Non HDL Cholesterol 215 (H) <130 mg/dL   ALT   Result Value Ref Range    ALT 26 0 - 70 U/L       All laboratory data reviewed  Lab Results   Component Value  Date    CHOL 281 08/09/2017     Lab Results   Component Value Date    HDL 66 08/09/2017     Lab Results   Component Value Date     08/09/2017     Lab Results   Component Value Date    TRIG 185 08/09/2017     No results found for: CHOLHDLRATIO  TSH   Date Value Ref Range Status   03/01/2013 1.58 0.4 - 5.0 mU/L Final     Last Basic Metabolic Panel:  Lab Results   Component Value Date     01/30/2017      Lab Results   Component Value Date    POTASSIUM 4.5 01/30/2017     Lab Results   Component Value Date    CHLORIDE 100 01/30/2017     Lab Results   Component Value Date    RULA 8.6 01/30/2017     Lab Results   Component Value Date    CO2 32 01/30/2017     Lab Results   Component Value Date    BUN 12 01/30/2017     Lab Results   Component Value Date    CR 0.71 01/30/2017     Lab Results   Component Value Date     01/30/2017     Lab Results   Component Value Date    WBC 7.3 08/01/2016    WBC 7.3 08/01/2016     Lab Results   Component Value Date    RBC 4.27 08/01/2016    RBC 4.27 08/01/2016     Lab Results   Component Value Date    HGB 13.5 08/01/2016    HGB 13.5 08/01/2016     Lab Results   Component Value Date    HCT 40.7 08/01/2016    HCT 40.7 08/01/2016     Lab Results   Component Value Date    MCV 95 08/01/2016    MCV 95 08/01/2016     Lab Results   Component Value Date    MCH 31.6 08/01/2016    MCH 31.6 08/01/2016     Lab Results   Component Value Date    MCHC 33.2 08/01/2016    MCHC 33.2 08/01/2016     Lab Results   Component Value Date    RDW 12.7 08/01/2016    RDW 12.7 08/01/2016     Lab Results   Component Value Date     08/01/2016     08/01/2016

## 2017-08-09 NOTE — MR AVS SNAPSHOT
"              After Visit Summary   2017    Jovi Aldana    MRN: [id number]           Patient Information     Date Of Birth          1945        Visit Information        Provider Department      2017 9:15 AM Liang Silverio MD [facility] [place]        Today's Diagnoses     Benign essential hypertension    -  1       Follow-ups after your visit        Who to contact     If you have questions or need follow up information about today's clinic visit or your schedule please contact [facility] directly at [phone].  Normal or non-critical lab and imaging results will be communicated to you by CDNlionhart, letter or phone within 4 business days after the clinic has received the results. If you do not hear from us within 7 days, please contact the clinic through CDNlionhart or phone. If you have a critical or abnormal lab result, we will notify you by phone as soon as possible.  Submit refill requests through [facility] or call your pharmacy and they will forward the refill request to us. Please allow 3 business days for your refill to be completed.          Additional Information About Your Visit        MyChart Information     [facility] lets you send messages to your doctor, view your test results, renew your prescriptions, schedule appointments and more. To sign up, go to [email or web address] . Click on \"Log in\" on the left side of the screen, which will take you to the Welcome page. Then click on \"Sign up Now\" on the right side of the page.     You will be asked to enter the access code listed below, as well as some personal information. Please follow the directions to create your username and password.     Your access code is: [email or web address]  Expires: 2017 12:23 PM     Your access code will  in 90 days. If you need help or a new code, please call your [place] clinic or [phone].        Care EveryWhere ID     " "This is your Care EveryWhere ID. This could be used by other organizations to access your [place] medical records  [id number]        Your Vitals Were     Pulse Height BMI (Body Mass Index)             84 1.854 m (6' 1\") 48.53 kg/m2          Blood Pressure from Last 3 Encounters:   08/09/17 162/78   02/06/17 174/78   01/18/17 158/70    Weight from Last 3 Encounters:   08/09/17 (!) 166.8 kg (367 lb 12.8 oz)   02/06/17 (!) 169.1 kg (372 lb 12.8 oz)   01/18/17 (!) 166.9 kg (368 lb)              We Performed the Following     Follow-Up with Cardiologist          Today's Medication Changes          These changes are accurate as of: 8/9/17 12:23 PM.  If you have any questions, ask your nurse or doctor.               Start taking these medicines.        Dose/Directions    spironolactone 25 MG tablet   Commonly known as:  ALDACTONE   Used for:  Benign essential hypertension   Started by:  Liang Silverio MD        Dose:  12.5 mg   Take 0.5 tablets (12.5 mg) by mouth daily   Quantity:  45 tablet   Refills:  3            Where to get your medicines      These medications were sent to [facility] [address] [place] [address] [id number] [place] [id number]     Phone:  [phone]     spironolactone 25 MG tablet                Primary Care Provider Office Phone # Fax #    Kemar Collins [phone]       Formerly [facility] [id number] [place] [id number]        Equal Access to Services     [facility]: Hadii sue hernandez Sogely, waaxda luqadaha, qaybta kaalmasaman goncalves. So [facility] [phone].    ATENCIÓN: Si habla español, tiene a bobo disposición servicios gratuitos de asistencia lingüística. Llame al [phone].    We comply with applicable federal civil rights laws and [place] laws. We do not discriminate on the basis of race, color, national origin, age, disability sex, sexual orientation or gender " identity.            Thank you!     Thank you for choosing [facility] [place]  for your care. Our goal is always to provide you with excellent care. Hearing back from our patients is one way we can continue to improve our services. Please take a few minutes to complete the written survey that you may receive in the mail after your visit with us. Thank you!             Your Updated Medication List - Protect others around you: Learn how to safely use, store and throw away your medicines at www.disposemymeds.org.          This list is accurate as of: 8/9/17 12:23 PM.  Always use your most recent med list.                   Brand Name Dispense Instructions for use Diagnosis    ALLEGRA 180 MG tablet   Generic drug:  fexofenadine      Take 1 tablet by mouth daily.        aspirin 81 MG tablet      Take 81 mg by mouth daily        clobetasol 0.05 % ointment    TEMOVATE     Apply topically as needed        DESONIL CREAM EX      Externally apply 0.05 oz topically as needed Taro        furosemide 20 MG tablet    LASIX     Take 20 mg by mouth daily.        gabapentin 300 MG capsule    NEURONTIN     Take 300 mg by mouth 4 times daily        JANUVIA 100 MG tablet   Generic drug:  sitagliptin      Take 100 mg by mouth        lisinopril 40 MG tablet    PRINIVIL/ZESTRIL     Take 40 mg by mouth daily        magnesium 250 MG tablet      Take 1 tablet by mouth daily.        metFORMIN 750 MG 24 hr tablet    GLUCOPHAGE-XR     Take 750 mg by mouth 2 times daily (with meals)        niacin 250 MG tablet      Take 1 tablet by mouth daily (with breakfast).        omeprazole 40 MG capsule    priLOSEC     Take 40 mg by mouth daily        potassium 99 MG Tabs      Take 1 tablet by mouth daily.        PSYLLIUM HUSK      500 mg as needed        spironolactone 25 MG tablet    ALDACTONE    45 tablet    Take 0.5 tablets (12.5 mg) by mouth daily    Benign essential hypertension       TEGRETOL 200 MG tablet   Generic  drug:  carBAMazepine      Take 200 mg by mouth 3 times daily        TMC      .25 %Menthol / 0.5 % pheno        vitamin E 400 UNITS Tabs      Take 1 tablet by mouth daily.

## 2017-08-17 ENCOUNTER — TRANSFERRED RECORDS (OUTPATIENT)
Dept: HEALTH INFORMATION MANAGEMENT | Facility: CLINIC | Age: 72
End: 2017-08-17

## 2018-05-02 ENCOUNTER — ANESTHESIA (OUTPATIENT)
Dept: SURGERY | Facility: CLINIC | Age: 73
End: 2018-05-02
Payer: MEDICARE

## 2018-05-02 ENCOUNTER — ANESTHESIA EVENT (OUTPATIENT)
Dept: SURGERY | Facility: CLINIC | Age: 73
End: 2018-05-02
Payer: MEDICARE

## 2018-05-02 ENCOUNTER — HOSPITAL ENCOUNTER (OUTPATIENT)
Facility: CLINIC | Age: 73
Discharge: HOME | End: 2018-05-02
Attending: INTERNAL MEDICINE | Admitting: INTERNAL MEDICINE
Payer: MEDICARE

## 2018-05-02 VITALS
WEIGHT: 315 LBS | SYSTOLIC BLOOD PRESSURE: 132 MMHG | RESPIRATION RATE: 16 BRPM | OXYGEN SATURATION: 95 % | TEMPERATURE: 98.8 F | BODY MASS INDEX: 41.75 KG/M2 | HEIGHT: 73 IN | DIASTOLIC BLOOD PRESSURE: 58 MMHG

## 2018-05-02 LAB
GLUCOSE BLDC GLUCOMTR-MCNC: 163 MG/DL (ref 70–99)
GLUCOSE BLDC GLUCOMTR-MCNC: 212 MG/DL (ref 70–99)
UPPER GI ENDOSCOPY: NORMAL

## 2018-05-02 PROCEDURE — 88305 TISSUE EXAM BY PATHOLOGIST: CPT | Performed by: INTERNAL MEDICINE

## 2018-05-02 PROCEDURE — 71000027 ZZH RECOVERY PHASE 2 EACH 15 MINS: Performed by: INTERNAL MEDICINE

## 2018-05-02 PROCEDURE — 82962 GLUCOSE BLOOD TEST: CPT

## 2018-05-02 PROCEDURE — 25000128 H RX IP 250 OP 636: Performed by: NURSE ANESTHETIST, CERTIFIED REGISTERED

## 2018-05-02 PROCEDURE — 25000128 H RX IP 250 OP 636: Performed by: INTERNAL MEDICINE

## 2018-05-02 PROCEDURE — 88305 TISSUE EXAM BY PATHOLOGIST: CPT | Mod: 26 | Performed by: INTERNAL MEDICINE

## 2018-05-02 PROCEDURE — 25000128 H RX IP 250 OP 636: Performed by: ANESTHESIOLOGY

## 2018-05-02 PROCEDURE — 37000009 ZZH ANESTHESIA TECHNICAL FEE, EACH ADDTL 15 MIN: Performed by: INTERNAL MEDICINE

## 2018-05-02 PROCEDURE — 37000008 ZZH ANESTHESIA TECHNICAL FEE, 1ST 30 MIN: Performed by: INTERNAL MEDICINE

## 2018-05-02 PROCEDURE — 25000125 ZZHC RX 250: Performed by: NURSE ANESTHETIST, CERTIFIED REGISTERED

## 2018-05-02 PROCEDURE — 40000306 ZZH STATISTIC PRE PROC ASSESS II: Performed by: INTERNAL MEDICINE

## 2018-05-02 PROCEDURE — 27210794 ZZH OR GENERAL SUPPLY STERILE: Performed by: INTERNAL MEDICINE

## 2018-05-02 PROCEDURE — 40000063 ZZH STATISTIC EGD (OR PROCEDURE): Performed by: INTERNAL MEDICINE

## 2018-05-02 PROCEDURE — 25000131 ZZH RX MED GY IP 250 OP 636 PS 637: Mod: GY | Performed by: ANESTHESIOLOGY

## 2018-05-02 PROCEDURE — 36000050 ZZH SURGERY LEVEL 2 1ST 30 MIN: Performed by: INTERNAL MEDICINE

## 2018-05-02 RX ORDER — LIDOCAINE 40 MG/G
CREAM TOPICAL
Status: DISCONTINUED | OUTPATIENT
Start: 2018-05-02 | End: 2018-05-02 | Stop reason: HOSPADM

## 2018-05-02 RX ORDER — SODIUM CHLORIDE, SODIUM LACTATE, POTASSIUM CHLORIDE, CALCIUM CHLORIDE 600; 310; 30; 20 MG/100ML; MG/100ML; MG/100ML; MG/100ML
INJECTION, SOLUTION INTRAVENOUS CONTINUOUS
Status: DISCONTINUED | OUTPATIENT
Start: 2018-05-02 | End: 2018-05-02 | Stop reason: HOSPADM

## 2018-05-02 RX ORDER — FLUMAZENIL 0.1 MG/ML
0.2 INJECTION, SOLUTION INTRAVENOUS
Status: DISCONTINUED | OUTPATIENT
Start: 2018-05-02 | End: 2018-05-02 | Stop reason: HOSPADM

## 2018-05-02 RX ORDER — HYDRALAZINE HYDROCHLORIDE 20 MG/ML
2.5-5 INJECTION INTRAMUSCULAR; INTRAVENOUS EVERY 10 MIN PRN
Status: DISCONTINUED | OUTPATIENT
Start: 2018-05-02 | End: 2018-05-02 | Stop reason: HOSPADM

## 2018-05-02 RX ORDER — FENTANYL CITRATE 50 UG/ML
25-50 INJECTION, SOLUTION INTRAMUSCULAR; INTRAVENOUS EVERY 5 MIN PRN
Status: DISCONTINUED | OUTPATIENT
Start: 2018-05-02 | End: 2018-05-02 | Stop reason: HOSPADM

## 2018-05-02 RX ORDER — PROPOFOL 10 MG/ML
INJECTION, EMULSION INTRAVENOUS CONTINUOUS PRN
Status: DISCONTINUED | OUTPATIENT
Start: 2018-05-02 | End: 2018-05-02

## 2018-05-02 RX ORDER — HYDROMORPHONE HYDROCHLORIDE 1 MG/ML
.3-.5 INJECTION, SOLUTION INTRAMUSCULAR; INTRAVENOUS; SUBCUTANEOUS EVERY 10 MIN PRN
Status: DISCONTINUED | OUTPATIENT
Start: 2018-05-02 | End: 2018-05-02 | Stop reason: HOSPADM

## 2018-05-02 RX ORDER — MEPERIDINE HYDROCHLORIDE 25 MG/ML
12.5 INJECTION INTRAMUSCULAR; INTRAVENOUS; SUBCUTANEOUS
Status: DISCONTINUED | OUTPATIENT
Start: 2018-05-02 | End: 2018-05-02 | Stop reason: HOSPADM

## 2018-05-02 RX ORDER — ONDANSETRON 2 MG/ML
4 INJECTION INTRAMUSCULAR; INTRAVENOUS
Status: COMPLETED | OUTPATIENT
Start: 2018-05-02 | End: 2018-05-02

## 2018-05-02 RX ORDER — NALOXONE HYDROCHLORIDE 0.4 MG/ML
.1-.4 INJECTION, SOLUTION INTRAMUSCULAR; INTRAVENOUS; SUBCUTANEOUS
Status: DISCONTINUED | OUTPATIENT
Start: 2018-05-02 | End: 2018-05-02 | Stop reason: HOSPADM

## 2018-05-02 RX ORDER — ONDANSETRON 4 MG/1
4 TABLET, ORALLY DISINTEGRATING ORAL EVERY 6 HOURS PRN
Status: DISCONTINUED | OUTPATIENT
Start: 2018-05-02 | End: 2018-05-02 | Stop reason: HOSPADM

## 2018-05-02 RX ORDER — ONDANSETRON 2 MG/ML
4 INJECTION INTRAMUSCULAR; INTRAVENOUS EVERY 6 HOURS PRN
Status: DISCONTINUED | OUTPATIENT
Start: 2018-05-02 | End: 2018-05-02 | Stop reason: HOSPADM

## 2018-05-02 RX ORDER — METOPROLOL TARTRATE 1 MG/ML
1-2 INJECTION, SOLUTION INTRAVENOUS EVERY 5 MIN PRN
Status: DISCONTINUED | OUTPATIENT
Start: 2018-05-02 | End: 2018-05-02 | Stop reason: HOSPADM

## 2018-05-02 RX ORDER — KETAMINE HYDROCHLORIDE 10 MG/ML
INJECTION INTRAMUSCULAR; INTRAVENOUS PRN
Status: DISCONTINUED | OUTPATIENT
Start: 2018-05-02 | End: 2018-05-02

## 2018-05-02 RX ORDER — ALBUTEROL SULFATE 0.83 MG/ML
2.5 SOLUTION RESPIRATORY (INHALATION) EVERY 4 HOURS PRN
Status: DISCONTINUED | OUTPATIENT
Start: 2018-05-02 | End: 2018-05-02 | Stop reason: HOSPADM

## 2018-05-02 RX ORDER — ONDANSETRON 2 MG/ML
4 INJECTION INTRAMUSCULAR; INTRAVENOUS EVERY 30 MIN PRN
Status: DISCONTINUED | OUTPATIENT
Start: 2018-05-02 | End: 2018-05-02 | Stop reason: HOSPADM

## 2018-05-02 RX ORDER — FENTANYL CITRATE 50 UG/ML
25-50 INJECTION, SOLUTION INTRAMUSCULAR; INTRAVENOUS
Status: DISCONTINUED | OUTPATIENT
Start: 2018-05-02 | End: 2018-05-02 | Stop reason: HOSPADM

## 2018-05-02 RX ORDER — ONDANSETRON 4 MG/1
4 TABLET, ORALLY DISINTEGRATING ORAL EVERY 30 MIN PRN
Status: DISCONTINUED | OUTPATIENT
Start: 2018-05-02 | End: 2018-05-02 | Stop reason: HOSPADM

## 2018-05-02 RX ORDER — PROPOFOL 10 MG/ML
INJECTION, EMULSION INTRAVENOUS PRN
Status: DISCONTINUED | OUTPATIENT
Start: 2018-05-02 | End: 2018-05-02

## 2018-05-02 RX ADMIN — ONDANSETRON 4 MG: 2 INJECTION INTRAMUSCULAR; INTRAVENOUS at 08:03

## 2018-05-02 RX ADMIN — SODIUM CHLORIDE, POTASSIUM CHLORIDE, SODIUM LACTATE AND CALCIUM CHLORIDE: 600; 310; 30; 20 INJECTION, SOLUTION INTRAVENOUS at 07:47

## 2018-05-02 RX ADMIN — PROPOFOL 50 MCG/KG/MIN: 10 INJECTION, EMULSION INTRAVENOUS at 07:53

## 2018-05-02 RX ADMIN — PROPOFOL 30 MG: 10 INJECTION, EMULSION INTRAVENOUS at 07:54

## 2018-05-02 RX ADMIN — SODIUM CHLORIDE 2 UNITS: 9 INJECTION, SOLUTION INTRAVENOUS at 06:50

## 2018-05-02 RX ADMIN — PROPOFOL 30 MG: 10 INJECTION, EMULSION INTRAVENOUS at 07:51

## 2018-05-02 RX ADMIN — PROPOFOL 30 MG: 10 INJECTION, EMULSION INTRAVENOUS at 08:09

## 2018-05-02 RX ADMIN — KETAMINE HYDROCHLORIDE 30 MG: 10 INJECTION, SOLUTION INTRAMUSCULAR; INTRAVENOUS at 07:52

## 2018-05-02 RX ADMIN — MIDAZOLAM 2 MG: 1 INJECTION INTRAMUSCULAR; INTRAVENOUS at 07:52

## 2018-05-02 RX ADMIN — PROPOFOL 30 MG: 10 INJECTION, EMULSION INTRAVENOUS at 07:58

## 2018-05-02 NOTE — ANESTHESIA PREPROCEDURE EVALUATION
Anesthesia Evaluation     .             ROS/MED HX    ENT/Pulmonary:     (+)sleep apnea, , . .    Neurologic:       Cardiovascular:     (+) hypertension----. : . . . :. .       METS/Exercise Tolerance:     Hematologic:         Musculoskeletal:         GI/Hepatic:     (+) GERD       Renal/Genitourinary:         Endo:     (+) type II DM Obesity (morbid obesity), .      Psychiatric:     (+) psychiatric history anxiety      Infectious Disease:         Malignancy:         Other:                     Physical Exam      Airway   Mallampati: III  TM distance: >3 FB  Neck ROM: full    Dental     Cardiovascular   Rhythm and rate: regular and normal      Pulmonary    breath sounds clear to auscultation                    Anesthesia Plan      History & Physical Review  History and physical reviewed and following examination; no interval change.    ASA Status:  3 .    NPO Status:  > 8 hours    Plan for MAC with Intravenous and Propofol induction. Maintenance will be TIVA.  Reason for MAC:  Deep or markedly invasive procedure (G8)  PONV prophylaxis:  Ondansetron (or other 5HT-3)       Postoperative Care  Postoperative pain management:  IV analgesics, Oral pain medications and Multi-modal analgesia.      Consents  Anesthetic plan, risks, benefits and alternatives discussed with:  Patient..                          .

## 2018-05-02 NOTE — DISCHARGE INSTRUCTIONS
ESOPHAGOGASTRODUODENOSCOPY DISCHARGE INSTRUCTIONS    You may not drive, use heavy equipment or consume alcohol for 24 hours because the drugs you were given may cause dizziness, drowsiness, forgetfulness and slower reaction time.    Small pieces or tissue (biopsies) or polyps may have been removed.    You may resume your regular diet and medications. Exception: If you had a biopsy or polypectomy, do not take aspirin, aleve (naproxen) or ibuprofen for the next 10 days.  Tylenol (acetaminophen) is safe to take.    Additional instructions:  If you had a biopsy or polypectomy, the pathology report will be sent to your doctor.  If you have not received the results within 10 days, call your doctor's office.    What to watch for:  Problems rarely occur after the procedure.  It is important for you to be aware of the early signs of a possible complication.  Call immediately if you notice any of the followin.  Unusual pain or difficulty swallowing.    2.  Unusual abdominal or chest pain.    3.  Vomiting of blood.    4.  Black or bloody stools.    5.  Temperature above 100.6 degrees F

## 2018-05-02 NOTE — ANESTHESIA POSTPROCEDURE EVALUATION
Patient: Jovi Aldana    Procedure(s):  ESOPHAGOSCOPY, GASTROSCOPY, DUODENOSCOPY (EGD), with biopsies, and esophogeal dilation.  - Wound Class: II-Clean Contaminated    Diagnosis:Dysphagia   Diagnosis Additional Information: Abdominal pain  Dr. Gilman    Anesthesia Type:  MAC    Note:  Anesthesia Post Evaluation    Patient location during evaluation: Phase 2  Patient participation: Able to fully participate in evaluation  Level of consciousness: awake  Pain management: adequate  Airway patency: patent  Cardiovascular status: acceptable  Respiratory status: acceptable  Hydration status: acceptable  PONV: none             Last vitals:  Vitals:    05/02/18 0825 05/02/18 0830 05/02/18 0835   BP: 133/54 135/52 171/70   Resp:      Temp:      SpO2:            Electronically Signed By: Aguila Ruiz MD  May 2, 2018  8:39 AM

## 2018-05-02 NOTE — IP AVS SNAPSHOT
[facility] PreOP/PostOP    [address]    [facility] [address]    Phone:  [phone]    Fax:  [phone]                                       After Visit Summary   5/2/2018    Jovi Aldana    MRN: [id number]           After Visit Summary Signature Page     I have received my discharge instructions, and my questions have been answered. I have discussed any challenges I see with this plan with the nurse or doctor.    ..........................................................................................................................................  Patient/Patient Representative Signature      ..........................................................................................................................................  Patient Representative Print Name and Relationship to Patient    ..................................................               ................................................  Date                                            Time    ..........................................................................................................................................  Reviewed by Signature/Title    ...................................................              ..............................................  Date                                                            Time

## 2018-05-02 NOTE — ANESTHESIA CARE TRANSFER NOTE
Patient: Jovi Aldana    Procedure(s):  ESOPHAGOSCOPY, GASTROSCOPY, DUODENOSCOPY (EGD), with biopsies, and esophogeal dilation.  - Wound Class: II-Clean Contaminated    Diagnosis: Dysphagia   Diagnosis Additional Information: No value filed.    Anesthesia Type:   MAC     Note:  Airway :Nasal Cannula  Patient transferred to:Phase II  Comments: VSS. Spontaneously breathing O2 per nasal cannula.  Report given to RN.Handoff Report: Identifed the Patient, Identified the Reponsible Provider, Reviewed the pertinent medical history, Discussed the surgical course, Reviewed Intra-OP anesthesia mangement and issues during anesthesia, Set expectations for post-procedure period and Allowed opportunity for questions and acknowledgement of understanding      Vitals: (Last set prior to Anesthesia Care Transfer)    CRNA VITALS  5/2/2018 0745 - 5/2/2018 0822      5/2/2018             Pulse: 63    SpO2: 94 %                Electronically Signed By: KRISTAN Thompson CRNA  May 2, 2018  8:22 AM

## 2018-05-02 NOTE — IP AVS SNAPSHOT
MRN:[id number]                      After Visit Summary   5/2/2018    Jovi Aldana    MRN: [id number]           Thank you!     Thank you for choosing [facility] for your care. Our goal is always to provide you with excellent care. Hearing back from our patients is one way we can continue to improve our services. Please take a few minutes to complete the written survey that you may receive in the mail after you visit. If you would like to speak to someone directly about your visit please contact Patient Relations at [phone]. Thank you!          Patient Information     Date Of Birth          1945        Designated Caregiver       Most Recent Value    Caregiver    Will someone help with your care after discharge? yes    Name of designated caregiver Estela    Phone number of caregiver home      About your hospital stay     You were admitted on:  May 2, 2018 You last received care in the:  [facility] PreOP/PostOP    You were discharged on:  May 2, 2018       Who to Call     For medical emergencies, please call 911.  For non-urgent questions about your medical care, please call your primary care provider or clinic, [phone]  For questions related to your surgery, please call your surgery clinic        Attending Provider     Provider Specialty    Link, Mic GOODMAN MD Gastroenterology       Primary Care Provider Office Phone # Fax #    Kemar Collins [phone]      Further instructions from your care team       ESOPHAGOGASTRODUODENOSCOPY DISCHARGE INSTRUCTIONS    You may not drive, use heavy equipment or consume alcohol for 24 hours because the drugs you were given may cause dizziness, drowsiness, forgetfulness and slower reaction time.    Small pieces or tissue (biopsies) or polyps may have been removed.    You may resume your regular diet and medications. Exception: If you had a biopsy or polypectomy, do not take aspirin, aleve (naproxen) or  "ibuprofen for the next 10 days.  Tylenol (acetaminophen) is safe to take.    Additional instructions:  If you had a biopsy or polypectomy, the pathology report will be sent to your doctor.  If you have not received the results within 10 days, call your doctor's office.    What to watch for:  Problems rarely occur after the procedure.  It is important for you to be aware of the early signs of a possible complication.  Call immediately if you notice any of the followin.  Unusual pain or difficulty swallowing.    2.  Unusual abdominal or chest pain.    3.  Vomiting of blood.    4.  Black or bloody stools.    5.  Temperature above 100.6 degrees F       Pending Results     Date and Time Order Name Status Description    2018 0808 Surgical pathology exam In process             Admission Information     Date & Time Provider Department Dept. Phone    2018 Link, Mic GOODMAN MD [facility] PreOP/PostOP [phone]      Your Vitals Were     Blood Pressure Temperature Respirations Height Weight Pulse Oximetry    143/63 98.6  F (37  C) 16 1.854 m (6' 1\") 162.4 kg (358 lb) 97%    BMI (Body Mass Index)                   47.23 kg/m2           SongAfter Information     SongAfter lets you send messages to your doctor, view your test results, renew your prescriptions, schedule appointments and more. To sign up, go to [email or web address] . Click on \"Log in\" on the left side of the screen, which will take you to the Welcome page. Then click on \"Sign up Now\" on the right side of the page.     You will be asked to enter the access code listed below, as well as some personal information. Please follow the directions to create your username and password.     Your access code is: [email or web address]  Expires: 2018  8:53 AM     Your access code will  in 90 days. If you need help or a new code, please call your [facility] or [phone].        Care EveryWhere ID     This is your Care EveryWhere ID. This could be used " by other organizations to access your [place] medical records  [id number]        Equal Access to Services     MADONNA TAYLOR : Rogerio Rogel, margarito graham, mikhail young, saman dumont. So [facility] [phone].    ATENCIÓN: Si habla español, tiene a bobo disposición servicios gratuitos de asistencia lingüística. Llame al [phone].    We comply with applicable federal civil rights laws and [place] laws. We do not discriminate on the basis of race, color, national origin, age, disability, sex, sexual orientation, or gender identity.               Review of your medicines      UNREVIEWED medicines. Ask your doctor about these medicines        Dose / Directions    ALLEGRA 180 MG tablet   Generic drug:  fexofenadine        Dose:  1 tablet   Take 1 tablet by mouth daily.   Refills:  0       aspirin 81 MG tablet        Dose:  81 mg   Take 81 mg by mouth daily   Refills:  0       BACLOFEN PO        Dose:  10 mg   Take 10 mg by mouth daily   Refills:  0       clobetasol 0.05 % ointment   Commonly known as:  TEMOVATE        Apply topically as needed   Refills:  0       DESONIL CREAM EX        Dose:  0.05 oz   Externally apply 0.05 oz topically as needed Taro   Refills:  0       furosemide 20 MG tablet   Commonly known as:  LASIX        Dose:  20 mg   Take 20 mg by mouth daily.   Refills:  0       gabapentin 300 MG capsule   Commonly known as:  NEURONTIN        Dose:  300 mg   Take 300 mg by mouth 4 times daily   Refills:  0       JANUVIA 100 MG tablet   Generic drug:  sitagliptin        Dose:  100 mg   Take 100 mg by mouth daily   Refills:  0       lisinopril 40 MG tablet   Commonly known as:  PRINIVIL/ZESTRIL        Dose:  40 mg   Take 40 mg by mouth daily   Refills:  0       Magnesium 400 MG Caps        Take by mouth 2 times daily   Refills:  0       metFORMIN 750 MG 24 hr tablet   Commonly known as:  GLUCOPHAGE-XR        Dose:  750 mg   Take 750 mg by mouth 2  times daily (with meals)   Refills:  0       niacin 250 MG tablet        Dose:  1 tablet   Take 1 tablet by mouth daily (with breakfast).   Refills:  0       OMEPRAZOLE PO        Dose:  40 mg   Take 40 mg by mouth 2 times daily (before meals)   Refills:  0       potassium 99 MG Tabs        Dose:  1 tablet   Take 1 tablet by mouth daily.   Refills:  0       spironolactone 25 MG tablet   Commonly known as:  ALDACTONE   Used for:  Benign essential hypertension        Dose:  12.5 mg   Take 0.5 tablets (12.5 mg) by mouth daily   Quantity:  45 tablet   Refills:  3       TEGRETOL 200 MG tablet   Generic drug:  carBAMazepine        Dose:  200 mg   Take 200 mg by mouth 3 times daily   Refills:  0       TOPROL XL PO        Dose:  50 mg   Take 50 mg by mouth daily   Refills:  0       VITAMIN D (CHOLECALCIFEROL) PO        Dose:  5000 Units   Take 5,000 Units by mouth 2 times daily   Refills:  0       vitamin E 400 units Tabs        Dose:  1 tablet   Take 1 tablet by mouth daily.   Refills:  0                Protect others around you: Learn how to safely use, store and throw away your medicines at www.disposemymeds.org.             Medication List: This is a list of all your medications and when to take them. Check marks below indicate your daily home schedule. Keep this list as a reference.      Medications           Morning Afternoon Evening Bedtime As Needed    ALLEGRA 180 MG tablet   Take 1 tablet by mouth daily.   Generic drug:  fexofenadine                                aspirin 81 MG tablet   Take 81 mg by mouth daily                                BACLOFEN PO   Take 10 mg by mouth daily                                clobetasol 0.05 % ointment   Commonly known as:  TEMOVATE   Apply topically as needed                                DESONIL CREAM EX   Externally apply 0.05 oz topically as needed Taro                                furosemide 20 MG tablet   Commonly known as:  LASIX   Take 20 mg by mouth daily.                                 gabapentin 300 MG capsule   Commonly known as:  NEURONTIN   Take 300 mg by mouth 4 times daily                                JANUVIA 100 MG tablet   Take 100 mg by mouth daily   Generic drug:  sitagliptin                                lisinopril 40 MG tablet   Commonly known as:  PRINIVIL/ZESTRIL   Take 40 mg by mouth daily                                Magnesium 400 MG Caps   Take by mouth 2 times daily                                metFORMIN 750 MG 24 hr tablet   Commonly known as:  GLUCOPHAGE-XR   Take 750 mg by mouth 2 times daily (with meals)                                niacin 250 MG tablet   Take 1 tablet by mouth daily (with breakfast).                                OMEPRAZOLE PO   Take 40 mg by mouth 2 times daily (before meals)                                potassium 99 MG Tabs   Take 1 tablet by mouth daily.                                spironolactone 25 MG tablet   Commonly known as:  ALDACTONE   Take 0.5 tablets (12.5 mg) by mouth daily                                TEGRETOL 200 MG tablet   Take 200 mg by mouth 3 times daily   Generic drug:  carBAMazepine                                TOPROL XL PO   Take 50 mg by mouth daily                                VITAMIN D (CHOLECALCIFEROL) PO   Take 5,000 Units by mouth 2 times daily                                vitamin E 400 units Tabs   Take 1 tablet by mouth daily.

## 2018-05-03 LAB — COPATH REPORT: NORMAL

## 2018-07-27 DIAGNOSIS — I10 BENIGN ESSENTIAL HYPERTENSION: ICD-10-CM

## 2018-07-27 RX ORDER — SPIRONOLACTONE 25 MG/1
12.5 TABLET ORAL DAILY
Qty: 45 TABLET | Refills: 0 | Status: SHIPPED | OUTPATIENT
Start: 2018-07-27 | End: 2018-12-13 | Stop reason: DRUGHIGH

## 2018-11-08 ENCOUNTER — OFFICE VISIT (OUTPATIENT)
Dept: PHARMACY | Facility: PHYSICIAN GROUP | Age: 73
End: 2018-11-08
Payer: COMMERCIAL

## 2018-11-08 VITALS — SYSTOLIC BLOOD PRESSURE: 130 MMHG | HEART RATE: 60 BPM | DIASTOLIC BLOOD PRESSURE: 72 MMHG

## 2018-11-08 DIAGNOSIS — E11.8 TYPE 2 DIABETES MELLITUS WITH COMPLICATION, WITHOUT LONG-TERM CURRENT USE OF INSULIN (H): Primary | ICD-10-CM

## 2018-11-08 PROCEDURE — 99605 MTMS BY PHARM NP 15 MIN: CPT | Performed by: PHARMACIST

## 2018-11-08 PROCEDURE — 99607 MTMS BY PHARM ADDL 15 MIN: CPT | Performed by: PHARMACIST

## 2018-11-08 RX ORDER — CARVEDILOL 12.5 MG/1
6.25 TABLET ORAL 2 TIMES DAILY WITH MEALS
COMMUNITY
End: 2018-12-13 | Stop reason: SINTOL

## 2018-11-08 NOTE — PROGRESS NOTES
SUBJECTIVE/OBJECTIVE:                Jovi Aldana is a 73 year old male coming in for a follow-up visit for Medication Therapy Management.  He was referred to me from Kemar Collins MD.     Chief Complaint: Follow up from our visit on 10/11/18.    Tobacco: No tobacco use  Alcohol: did not discuss today    Medication Adherence/Access: No concerns with compliance.    DIABETES:  Testing supplies: Accu check evelia plus  Pt currently taking:  Metformin 750 mg XR: BID with meals.   Trulicity 0.75 mg dose: Patient feels that this medication is better tolerated than previous GLP-1 Ozempic.  However, he still feels a side effects of nausea, constipation, headaches, malaise.  He comments that he tolerated the Januvia really well in the past and would like to go back to this medication if possible.  He denies any history of ulcers or frequent genitourinary infections.  SMBG: 3-4 times a week.   Ranges (fasting 120s-130s, postprandial <180, no 200s in the past 2 weeks, much improved).  Patient is not experiencing hypoglycemia  Recent symptoms of high blood sugar? none  Eye exam: up to date, last done 4/30/2018  Foot exam: up to date, last done 8/6/2018  ACEi/ARB: Yes: lisinopril 40 mg..   Urine Albumin: UACR is >30 mg/g on 8/6/18  Aspirin: Taking 81mg daily and denies side effects  Diet/Exercise: Did not discuss today.    A1c 8/6/18: 8.3%  11/8/18: 7.6%    Today's Vitals: /72  Pulse 60      ASSESSMENT:              Current medications were reviewed today as discussed above.      DIABETES: Uncontrolled, improving  Patient's A1c has improved over the past 3 months with GLP-1 therapy yet not at A1c <7%.  Additionally, patient is unable to tolerate GLP-1's due to adverse effects (see subjective section).  This patient has tolerated Januvia (DPP 4 inhibitor) in the past, recommend to re-start this medication and stop the GLP-1.  Patient will need synergistic therapy recommend trial SGLT-2 inhibitor today.     PLAN:                   1. Stop Trulciity  2. Restart Januvia 100 mg: once daily. - sent to Pharmacy, yet patient has some leftover at home.  3. Start Jardiance 10 mg: once daily. - sent to pharmacy and Dr. Collins provided samples today in clinic.  Discussed potential  infections with  Patient and to monitor for this side effect.    PharmD called pharmacy after the appointment and found jardiance copay to be $140/1 month and Januvia $430/3 months.    I spent 20 minutes with this patient today. All changes were made via verbal approval with Kemar Collins MD. A copy of the visit note was provided to the patient's primary care provider.     Will follow up in 3-4 weeks with PharmD to review SMBG.    The patient was verbally given a summary of these recommendations.    Chart documentation was completed in part with Dragon voice-recognition software. Even though reviewed, some grammatical, spelling, and word errors may remain.    Dr. Juany Smith, Medication Therapy Management (MTM) Pharmacist  [facility]: [facility]  [facility] Resident  Phone: [phone]      Patient was seen independently by Dr. Smith. I agree with her assessment and plan.   Razia Conner, Pharm.D, [facility]  Medication Therapy Management Pharmacist  [phone]

## 2018-11-26 ENCOUNTER — ALLIED HEALTH/NURSE VISIT (OUTPATIENT)
Dept: PHARMACY | Facility: PHYSICIAN GROUP | Age: 73
End: 2018-11-26
Payer: COMMERCIAL

## 2018-11-26 DIAGNOSIS — R52 GENERALIZED PAIN: ICD-10-CM

## 2018-11-26 DIAGNOSIS — E11.8 TYPE 2 DIABETES MELLITUS WITH COMPLICATION, WITHOUT LONG-TERM CURRENT USE OF INSULIN (H): Primary | ICD-10-CM

## 2018-11-26 PROCEDURE — 99606 MTMS BY PHARM EST 15 MIN: CPT | Performed by: PHARMACIST

## 2018-11-26 PROCEDURE — 99607 MTMS BY PHARM ADDL 15 MIN: CPT | Performed by: PHARMACIST

## 2018-11-27 NOTE — PROGRESS NOTES
SUBJECTIVE/OBJECTIVE:                Jovi Aldana is a 73 year old male called for a follow-up visit for Medication Therapy Management.  He was referred to me from Kemar Collins MD.     Chief Complaint: Follow up from our visit on 11/8/18.  Side effects from medications.  Peterson/Marlon (wife) sent patient portal message stating that Peterson's pain is continuing to increase in his back, hips, legs and hands.  They states that it correlates to his using carvedilol.  They state that they checked on the side effects and these are listed.  Since they know he is not supposed to stop carvedilol they are looking for help trying to ease off of the medication or try something else.    Additionally they note that his blood sugars have decreased on the Jardiance.  Yet Peterson has noticed his penis is starting to itch and burn since starting Jardiance.    Tobacco: No tobacco use  Alcohol: Did not discuss today    Medication Adherence/Access:  no issues reported    DIABETES:   At the last visit Peterson was switched off of GLP-1 therapy (Trulicity) onto SGLT-2 therapy (Jardiance 10 mg daily) plus DPP-4 therapy (Januvia 100 mg daily).  He had previously taken Januvia before starting the GLP-1 therapy.  He comments that these medications seem to be going better than the GLP-1, beside the penis adverse effects.  Marlon in detail note that just a few days ago Peterson started having some burning and itching on his penis.  They had a topical agent from the dermatologist at home and they decided to try this on the penis, which seems to have resolved the burning and itching.  It is a compounded product from the dermatologist that they recommended for itching or burning skin.  It contains mometasone and hyaluronic acid.  Continues metformin 750 mg XR tab twice daily.  Peterson states that his sugars have been in the mid 100s.  He even states that he had a blood sugar after a meal in the 140s.    PAIN/BLOOD PRESSURE:   Peterson and Marlon feel that the  carvedilol has caused they state they noticed this on the side effects Magno pain to worsen.  Listed for carvedilol.  They also state that before they read the side effects (back when beginning therapy) Peterson said he felt like this medicine was going to make his pain worse.  They also feel that the pain has worsened since the increase in the carvedilol dose and would like to be tapered down, potentially off of this medication.  Patient also currently taking lisinopril 40 every day, furosemide 20 mg every day, spironolactone 12.5 mg every day. Previous sensitivities to many medications (See allergies list).    Today's Vitals: There were no vitals taken for this visit. Telemedicine  BP Readings from Last 3 Encounters:   11/08/18 130/72   05/02/18 132/58   08/09/17 162/78       ASSESSMENT:              Current medications were reviewed today as discussed above.      Medication Adherence: no issues identified    DIABETES:   Peterson is experiencing undesirable effect from the Jardiance.  Discussed symptoms directly with provider in clinic today.  Provider recommends that if burning and itching are mild this is nothing to be concerned about, but if severe, does not go away, or worsens patient should be seen by provider.  Pharm.D. agrees and communicates this to the patient.  PharmD also discussed with patient that skin on the penis is very thin and so only a very small amount of that dermatologist compounded topical agent should be used, although Pharm.D. recommends that if they feel the need to continue therapy Peterson should be seen by Dr. Collins rather than continuing the topical as this could be a fungal or bacterial infection.    PAIN/BLOOD PRESSURE:   Patient suspects undesirable effect from carvedilol..  Per [employer], carvedilol has a 1-6% incidence of arthralgias.  Yet, patient has a significant history for high sensitivity to medication reactions.  Pain could also be increasing due to a multitude of other reasons  (time of year, change in activity, etc.).  However since patient is adamant about decreasing medication, Pharm.D. will assist in facilitating this does decrease at this time. Discussed with provider and we will begin with lowering the dose to assess if the pain changes at all.  Then reassess in 1 week.  Discussed with patient that if we taper off this medication completely, patient will likely need alternate therapy for hypertension.     PLAN:                  1. Decrease carvedilol to 1/2 tab (6.25 mg) twice daily x 1 week, then call or send PharmD a message with improvements/changes in pain.  Continue checking BP at home and call in readings.    2. Monitor penis for itching/burning.  If this does not resolve or worsens, schedule with Dr. Collins for further assessment.    I spent 20 minutes with this patient today. All changes were made via verbal approval with Kemar Collins MD. A copy of the visit note was provided to the patient's primary care provider.    Will follow up in on 12/10 in clinic, sooner with Dr. Collins if needed..    The patient was not given a summary of these recommendations as an after visit summary. The teach-back method was used to confirm patient understanding.    Chart documentation was completed in part with Dragon voice-recognition software. Even though reviewed, some grammatical, spelling, and word errors may remain.    Dr. Junay Smith, Medication Therapy Management (MTM) Pharmacist  [facility]: [facility], [facility]  [facility] Resident  Phone: [phone] [phone]      Patient was seen independently by Dr. Smith. I agree with her assessment and plan.   Razia Conner, Pharm.D, [facility]  Medication Therapy Management Pharmacist  [phone]

## 2018-12-10 ENCOUNTER — OFFICE VISIT (OUTPATIENT)
Dept: PHARMACY | Facility: PHYSICIAN GROUP | Age: 73
End: 2018-12-10
Payer: COMMERCIAL

## 2018-12-10 VITALS — SYSTOLIC BLOOD PRESSURE: 144 MMHG | HEART RATE: 78 BPM | DIASTOLIC BLOOD PRESSURE: 60 MMHG | OXYGEN SATURATION: 96 %

## 2018-12-10 DIAGNOSIS — E11.8 TYPE 2 DIABETES MELLITUS WITH COMPLICATION, WITHOUT LONG-TERM CURRENT USE OF INSULIN (H): Primary | ICD-10-CM

## 2018-12-10 DIAGNOSIS — I10 ESSENTIAL HYPERTENSION: ICD-10-CM

## 2018-12-10 DIAGNOSIS — K59.00 CONSTIPATION, UNSPECIFIED CONSTIPATION TYPE: ICD-10-CM

## 2018-12-10 PROCEDURE — 99607 MTMS BY PHARM ADDL 15 MIN: CPT | Performed by: PHARMACIST

## 2018-12-10 PROCEDURE — 99606 MTMS BY PHARM EST 15 MIN: CPT | Performed by: PHARMACIST

## 2018-12-10 NOTE — PATIENT INSTRUCTIONS
PLAN:    1. Continue diabetes regimen as is.  Continue the Jardiance at least until you see Dr. Collins.  2. Continue to check your BG as is.  It is okay if you decrease some of your morning fasting checking routine.  3. We will draw a urine sample today and Dr. CAPPS will call you with results/plan.  4. Increase your spironolactone to 1 full tablet daily.  5. Return to clinic on 12/20 for an appointment with Dr. Collins.  We will draw labs to test your potassium on this day.  Please cut back your bananas to about 3/week.    Make a follow-up appointment with PharmD in the end of January.    Thanks for coming in to see me today!  Dr. Juany Smith, Medication Therapy Management (MTM) Pharmacist  [facility]: [facility], [facility]  [facility] Resident  Phone: [phone]

## 2018-12-10 NOTE — PROGRESS NOTES
SUBJECTIVE/OBJECTIVE:                Jovi Aldana is a 73 year old male coming in for a follow-up visit for Medication Therapy Management.  He was referred to me from Kemar Collins MD.   He presents with his wife today who is highly involved in his care.    Chief Complaint: Follow up from our visit on 11/26/18 for diabetes.  Tobacco: No tobacco use  Alcohol: Did not discuss today    Medication Adherence/Access:  no issues reported    DIABETES:  Pt currently taking   Metformin 750 mg ER BID.  Januvia 100 mg: daily  Jardiance 10 mg, started on 11/26/18. Pt is experiencing the following side effects: burning sensation when urinating.  States that his penis itself does not hurt and it is not red, but his urine seems to spray out more than normal.  He says that if his urine dries on the toilet, it is sticky, there is also a white substance that comes out with his urine and his wife asks if it is actually sugar. Used to feel urgency in the morning from furosemide and spironolactone. Now needing to go to the bathroom once every hour.  SMBG: fasting and occasionally 2 hours after meals, see below.   Patient is not experiencing hypoglycemia  Recent symptoms of high blood sugar? none  Eye exam: up to date, states that he sees his eye doctor every 6 months, no history of retinal damage.  Foot exam: up to date, last done 8/6/2018.  ACEi/ARB: Yes: lisinopril 40 mg.   Urine Albumin: UACR is >30 mg/g on 8/6/18  Aspirin: Taking 81mg daily and denies side effects  Diet/Exercise: States that he is unable to get exercise because of his pain and his weight.  Peterson states that he has noticed his diet to affect his blood sugars.  Specifically, he notes that certain cereals because the sugars to be higher after his breakfast.  He is interested making some dietary changes because he is sick of the adverse effects from medications.  He and his wife asked about whether medications might be an option or if there at the end of the  road of options.  They want to know what the risks are of having blood sugar slightly above goal.    Last A1c: 11/8/18: 7.6% (while on/off ozempic, trulicity)  Record of home blood sugars:  Date Fasting AM Before lunch After lunch Before Dinner After Dinner Before Bedtime Late night   12/10 128         12/7 127 Oatmeal; 187        12/5 131 Eggs and toast; 154        12/2 127 Cold cereal, milk , blueberries; 262        11/30 134         11/28 121 197        11/26 136  195       11/23 112 178        11/21 128    125       HYPERTENSION:  Current meds:   Furosemide 20 mg: once daily  Lisinopril 40 mg: once daily  Spironolactone 25 mg: once daily.  Stopped Carvedilol on Thursday.  After taking the carvedilol off, the pain started coming back to his normal level.  Unknown whether it is a combination of the new diabetes meds+ the carvedilol or just the carvedilol that is causing the worsening arthralgias.  Peterson (and his wife) state that right now he is eating 1-2 bananas/day.  They had stopped the potassium supplement shortly after their initial MTM visit.    BP:  12/3: 126/72 P60 10:30a  120/76 P60 1:30p  12/6 stopped carvedilol  12/7: 139/70 P60 1:00p  12/9: 142/66 P62 10pm  12/10: 126/63 P62 9a    CONSTIPATION:  He used to have bristol stool type 3, now bristol stool type 1-2.  Even while taking senna  2 in the morning and 2 in the evening, he is still going 2 to 3 days before having a BM.  This bothers him because he wants to go more often. He is not sure what medication is causing this, states it could be the carvedilol or history of ozempic/trulicity.  Never tried miralax.    Today's Vitals: /60   Pulse 78   SpO2 96%   First BP today: 150/62    ASSESSMENT:              Current medications were reviewed today as discussed above.      Medication Adherence: good, no issues identified    DIABETES:  Patient's A1c mildly above ADA goal of <7%.  Additionally, this A1c was taken while patient was on/off Ozempic and  trulicity therapy.  With current regimen (jardiance instead of GLP1), patient's blood sugars are near goal but remain mildly elevated.  Patient would benefit from dietary changes and education on carbohydrates.  Much of the visit was spent discussing microvascular and macrovascular complications of diabetes.  Pharm.D. also provided education on a carbohydrate counting and choosing low-carb meals.  Additionally patient is experiencing unusual urination likely a side effect of Jardiance medication.  Pharm.D. discussed with provider today who recommends urinalysis and urine culture.  Provider asked that patient follow-up with him and states that appointment on 12/20 is sufficient.  Would like patient to continue with the Jardiance at this time.  In future, may consider dose increase of metformin from 1500mg to 2000 mg/day for potential additional blood glucose lowering effects.    HYPERTENSION:  Since stopping carvedilol, patient's pulse and blood pressure have mildly increased and patient is no longer at ADA or ACC/AHA blood pressure goals.  Since patient is currently tolerating spironolactone and has significant history of medication intolerances, PharmD recommends a dose increase of this today.  However, there is concern that patient will have a high potassium and therefore Pharm.D. recommend cutting out bananas.  Patient is hesitant and is going to decrease to 3 bananas per week.    CONSTIPATION:  Patient benefit from additional  medication for constipation.  Physical activity may help with constipation however patient is unable to get this at this time.  Therefore over-the-counter MiraLAX as recommended.      PLAN:                  PLAN:    1. Continue diabetes regimen as is.  Continue the Jardiance at least until you see Dr. Collins.  2. Continue to check your BG as is.  It is okay if you decrease some of your morning fasting checking routine.  3. We will draw a urine sample today and Dr. CAPPS will call you with  "results/plan.  4. Increase your spironolactone to 1 full tablet daily.  5. Return to clinic on 12/20 for an appointment with Dr. Collins.  We will draw labs to test your potassium on this day.  Please cut back your bananas to about 3/week.  6. Verbally recommended that patient trial 1 capful of miralax for constipation.  7. Verbally encouraged patient to decrease carb intake.  Patient provided wit Carb Counting booklet.    Make a follow-up appointment with PharmD in the end of January.    I spent 60 minutes with this patient today. All changes were made via verbal approval with Kemar Collins MD. A copy of the visit note was provided to the patient's primary care provider.     Will follow up in 1.5 months with PharmD.    The patient was given a summary of these recommendations as an after visit summary.    Chart documentation was completed in part with Dragon voice-recognition software. Even though reviewed, some grammatical, spelling, and word errors may remain.    Dr. Juany Smith, Medication Therapy Management (MTM) Pharmacist  [facility]: YADIRA W, [place]  [facility] Resident  Phone: [phone] ext.[phone]    Addendum 12/13/18:  Patient was called on 12/13/18 to schedule lab only appointment for potassium draw either tomorrow (Friday 12/14/18) or Saturday, 12/15/18 and transferred to lab for scheduling.  Of note, patient also states that he is having some new redness and discomfort on his penis.  When asked how painful, he says \"uncomfortable.\"  Patient was told if it gets worse that to return to urgent care before visit with Dr. Collins (which was changed to 12/17).      Patient was seen independently by Dr. Smith. I agree with her assessment and plan.   Razia Conner, Pharm.D, [facility]  Medication Therapy Management Pharmacist  [phone]      "

## 2018-12-13 RX ORDER — AMOXICILLIN 250 MG
2 CAPSULE ORAL 2 TIMES DAILY PRN
COMMUNITY

## 2018-12-13 RX ORDER — SPIRONOLACTONE 25 MG/1
25 TABLET ORAL DAILY
COMMUNITY
End: 2020-07-07

## 2019-01-10 ENCOUNTER — TRANSFERRED RECORDS (OUTPATIENT)
Dept: HEALTH INFORMATION MANAGEMENT | Facility: CLINIC | Age: 74
End: 2019-01-10

## 2019-01-10 LAB — HBA1C MFR BLD: 7.5 % (ref 4.8–5.6)

## 2019-01-28 ENCOUNTER — TELEPHONE (OUTPATIENT)
Dept: PHARMACY | Facility: PHYSICIAN GROUP | Age: 74
End: 2019-01-28

## 2019-01-28 DIAGNOSIS — E11.8 TYPE 2 DIABETES MELLITUS WITH COMPLICATION, WITHOUT LONG-TERM CURRENT USE OF INSULIN (H): Primary | ICD-10-CM

## 2019-01-28 RX ORDER — METFORMIN HCL 500 MG
1000 TABLET, EXTENDED RELEASE 24 HR ORAL 2 TIMES DAILY WITH MEALS
Qty: 360 TABLET | Refills: 3 | COMMUNITY
Start: 2019-01-28 | End: 2020-08-04

## 2019-01-29 NOTE — TELEPHONE ENCOUNTER
Patient sent portal message with concerns about insurance coverage for metformin  mg.   PharmD spoke with PCP, Kemar Collins MD who approved change to metformin  mg tabs.  PharmD called patient and sent portal message to communicate changes.    Medication list in Epic updated.    Dr. Juany Smith, Medication Therapy Management (MTM) Pharmacist  [facility]: M, W, Th  [facility] Resident  Phone: [phone] [phone]

## 2019-12-05 ENCOUNTER — TRANSFERRED RECORDS (OUTPATIENT)
Dept: HEALTH INFORMATION MANAGEMENT | Facility: CLINIC | Age: 74
End: 2019-12-05

## 2020-05-05 ENCOUNTER — OFFICE VISIT (OUTPATIENT)
Dept: FAMILY MEDICINE | Facility: CLINIC | Age: 75
End: 2020-05-05

## 2020-05-05 VITALS
HEIGHT: 73 IN | TEMPERATURE: 99.5 F | SYSTOLIC BLOOD PRESSURE: 144 MMHG | WEIGHT: 315 LBS | BODY MASS INDEX: 41.75 KG/M2 | HEART RATE: 74 BPM | DIASTOLIC BLOOD PRESSURE: 77 MMHG | OXYGEN SATURATION: 97 %

## 2020-05-05 DIAGNOSIS — Z71.89 ACP (ADVANCE CARE PLANNING): ICD-10-CM

## 2020-05-05 DIAGNOSIS — E66.01 MORBID OBESITY (H): ICD-10-CM

## 2020-05-05 DIAGNOSIS — I10 ESSENTIAL HYPERTENSION: ICD-10-CM

## 2020-05-05 DIAGNOSIS — G50.0 TRIGEMINAL NEURALGIA: ICD-10-CM

## 2020-05-05 DIAGNOSIS — I47.10 SUPRAVENTRICULAR TACHYCARDIA (H): ICD-10-CM

## 2020-05-05 DIAGNOSIS — E11.8 TYPE 2 DIABETES MELLITUS WITH COMPLICATION, WITHOUT LONG-TERM CURRENT USE OF INSULIN (H): ICD-10-CM

## 2020-05-05 DIAGNOSIS — Z76.89 HEALTH CARE HOME: ICD-10-CM

## 2020-05-05 DIAGNOSIS — E11.9 TYPE 2 DIABETES MELLITUS WITHOUT COMPLICATION, WITHOUT LONG-TERM CURRENT USE OF INSULIN (H): Primary | ICD-10-CM

## 2020-05-05 LAB
BUN SERPL-MCNC: 9 MG/DL (ref 7–25)
BUN/CREATININE RATIO: 10.7 (ref 6–22)
CALCIUM SERPL-MCNC: 9.3 MG/DL (ref 8.6–10.3)
CHLORIDE SERPLBLD-SCNC: 98.5 MMOL/L (ref 98–110)
CO2 SERPL-SCNC: 30.7 MMOL/L (ref 20–32)
CREAT SERPL-MCNC: 0.84 MG/DL (ref 0.7–1.18)
GLUCOSE SERPL-MCNC: 194 MG/DL (ref 60–99)
HBA1C MFR BLD: 7.5 % (ref 4–7)
POTASSIUM SERPL-SCNC: 5.11 MMOL/L (ref 3.5–5.3)
SODIUM SERPL-SCNC: 137.9 MMOL/L (ref 135–146)

## 2020-05-05 PROCEDURE — 83036 HEMOGLOBIN GLYCOSYLATED A1C: CPT | Performed by: FAMILY MEDICINE

## 2020-05-05 PROCEDURE — 36415 COLL VENOUS BLD VENIPUNCTURE: CPT | Performed by: FAMILY MEDICINE

## 2020-05-05 PROCEDURE — 80048 BASIC METABOLIC PNL TOTAL CA: CPT | Performed by: FAMILY MEDICINE

## 2020-05-05 PROCEDURE — 99202 OFFICE O/P NEW SF 15 MIN: CPT | Performed by: FAMILY MEDICINE

## 2020-05-05 RX ORDER — CARBAMAZEPINE 200 MG/1
200 TABLET ORAL
Qty: 450 TABLET | Refills: 1 | Status: SHIPPED | OUTPATIENT
Start: 2020-05-05 | End: 2020-10-27

## 2020-05-05 RX ORDER — NIACIN 250 MG
250 TABLET, EXTENDED RELEASE ORAL DAILY
COMMUNITY

## 2020-05-05 RX ORDER — KETOCONAZOLE 20 MG/G
CREAM TOPICAL DAILY
COMMUNITY
End: 2020-09-30

## 2020-05-05 RX ORDER — CETIRIZINE HYDROCHLORIDE 10 MG/1
10 TABLET ORAL DAILY
COMMUNITY
End: 2020-08-04

## 2020-05-05 RX ORDER — FEXOFENADINE HCL 180 MG/1
180 TABLET ORAL DAILY
COMMUNITY

## 2020-05-05 RX ORDER — AMLODIPINE BESYLATE 2.5 MG/1
TABLET ORAL
COMMUNITY
Start: 2020-04-24 | End: 2020-07-23

## 2020-05-05 RX ORDER — FLUTICASONE PROPIONATE 50 MCG
1 SPRAY, SUSPENSION (ML) NASAL DAILY PRN
COMMUNITY
Start: 2020-01-22 | End: 2023-08-01

## 2020-05-05 ASSESSMENT — MIFFLIN-ST. JEOR: SCORE: 2371.01

## 2020-05-05 NOTE — NURSING NOTE
Peterson is here for non fasting med check    Pre-visit Screening:  Immunizations:  Needs TD at pharmacy  Colonoscopy:  NA d/t age  Mammogram: NA  Asthma Action Test/Plan:  NA  PHQ9:  None  GAD7:  None  Questioned patient about current smoking habits Pt. has never smoked.  Ok to leave detailed message on voice mail for today's visit only Yes, phone # [phone]

## 2020-05-05 NOTE — LETTER
May 8, 2020      Jovi Aldana  [address] [place] [id number]        Dear ,    We are writing to inform you of your test results.    Your test results fall within the expected range(s) or remain unchanged from previous results.  Please continue with current treatment plan.  Kidney fnct was fine    Resulted Orders   Hemoglobin A1c ([facility])   Result Value Ref Range    Hemoglobin A1C 7.5 (A) 4.0 - 7.0 %   Basic Metabolic Panel ([facility])   Result Value Ref Range    Carbon Dioxide 30.7 20 - 32 mmol/L    Creatinine 0.84 0.70 - 1.18 mg/dL    Glucose 194 (A) 60 - 99 mg/dL    Sodium 137.9 135 - 146 mmol/L    Potassium 5.11 3.5 - 5.3 mmol/L    Chloride 98.5 98 - 110 mmol/L    Urea Nitrogen 9 7 - 25 mg/dL    Calcium 9.3 8.6 - 10.3 mg/dL    BUN/Creatinine Ratio 10.7 6 - 22       If you have any questions or concerns, please call the clinic at the number listed above.       Sincerely,        Kemar Collins MD

## 2020-05-06 ENCOUNTER — TELEPHONE (OUTPATIENT)
Dept: FAMILY MEDICINE | Facility: CLINIC | Age: 75
End: 2020-05-06

## 2020-05-11 ENCOUNTER — MYC MEDICAL ADVICE (OUTPATIENT)
Dept: FAMILY MEDICINE | Facility: CLINIC | Age: 75
End: 2020-05-11

## 2020-05-12 ENCOUNTER — OFFICE VISIT (OUTPATIENT)
Dept: FAMILY MEDICINE | Facility: CLINIC | Age: 75
End: 2020-05-12

## 2020-05-12 VITALS
HEART RATE: 85 BPM | OXYGEN SATURATION: 99 % | SYSTOLIC BLOOD PRESSURE: 170 MMHG | DIASTOLIC BLOOD PRESSURE: 74 MMHG | TEMPERATURE: 98.3 F

## 2020-05-12 DIAGNOSIS — G54.2 CERVICAL NERVE ROOT IMPINGEMENT: Primary | ICD-10-CM

## 2020-05-12 PROCEDURE — 72040 X-RAY EXAM NECK SPINE 2-3 VW: CPT | Performed by: FAMILY MEDICINE

## 2020-05-12 PROCEDURE — 99213 OFFICE O/P EST LOW 20 MIN: CPT | Performed by: FAMILY MEDICINE

## 2020-05-12 NOTE — PROGRESS NOTES
"3 weeks of right sided neck pain with radiation into the scapula  positional and worse with head turning   No radiation into the arm    Allergies:   Allergies   Allergen Reactions     Arthrotec      Atorvastatin      Muscle aches     Augmentin Diarrhea     Carvedilol      Patient had arthralgias, he attributed to carvedilol.     Celebrex [Celecoxib]      Stomach pain     Colestipol      Muscle pain, occurred in Jan 2011     Cozaar [Losartan]      Crestor [Rosuvastatin]      Gemfibrozil      Glucotrol [Glipizide]      Hmg-Coa-R Inhibitors      Muscle Pain     Ibuprofen      Bloody noses a couple of times on this medication.  Occurred in 12/2011     Nabumetone      Relafin - stomach pain     Oxycodone-Acetaminophen Other (See Comments)     Patient states it makes him\"loopy\"     Ozempic [Semaglutide]      trialed x several weeks, extreme nausea.     Vioxx [Rofecoxib]      Zocor [Simvastatin]      Erythromycin Rash     Ketoconazole Rash     Was to treat a rash and the rash worsened.     Sulfabenzamide Rash     Zetia [Ezetimibe] Rash     Happened in 2006       amLODIPine (NORVASC) 2.5 MG tablet,   aspirin 81 MG tablet, Take 81 mg by mouth daily  BACLOFEN PO, Take 10 mg by mouth daily  carBAMazepine (TEGRETOL) 200 MG tablet, Take 1 tablet (200 mg) by mouth 5 times daily  cetirizine (ZYRTEC) 10 MG tablet, Take 10 mg by mouth daily  clobetasol (TEMOVATE) 0.05 % ointment, Apply topically as needed   Desonide Crea-Wound Dress Crea (DESONIL CREAM EX), Externally apply 0.05 oz topically as needed Taro   fexofenadine (ALLEGRA) 180 MG tablet, Take 180 mg by mouth daily  fluticasone (FLONASE) 50 MCG/ACT nasal spray,   Furosemide (LASIX) 20 MG tablet, Take 20 mg by mouth daily.  gabapentin (NEURONTIN) 300 MG capsule, Take 300 mg by mouth 4 times daily  ketoconazole (NIZORAL) 2 % external cream, Apply topically daily  lisinopril (PRINIVIL/ZESTRIL) 40 MG tablet, Take 40 mg by mouth daily  Magnesium 400 MG CAPS, Take by mouth 2 times " daily  metFORMIN (GLUCOPHAGE-XR) 500 MG 24 hr tablet, Take 2 tablets (1,000 mg) by mouth 2 times daily (with meals)  niacin (SLO-NIACIN) 250 MG TBCR CR tablet, Take 250 mg by mouth  OMEPRAZOLE PO, Take 40 mg by mouth 2 times daily (before meals)  Polyethylene Glycol 3350 (MIRALAX PO), Take 1 capful by mouth Mixed in water once daily as needed.  senna-docusate (SENOKOT-S/PERICOLACE) 8.6-50 MG tablet, Take 2 tablets by mouth 2 times daily  sitagliptin (JANUVIA) 100 MG tablet, Take 100 mg by mouth daily   spironolactone (ALDACTONE) 25 MG tablet, Take 25 mg by mouth daily  UNABLE TO FIND, MEDICATION NAME: mometasone, hyaluronic acid compound from dermatologist.   Applying topically to areas of itchy skin, per patient.  VITAMIN D, CHOLECALCIFEROL, PO, Take 5,000 Units by mouth 2 times daily  vitamin E 400 UNITS TABS, Take 1 tablet by mouth daily.    No current facility-administered medications on file prior to visit.       Patient Active Problem List   Diagnosis     Trigeminal neuralgia     Hypertension     Type 2 diabetes mellitus with complication, without long-term current use of insulin (H)     Arthritis     Sleep apnea     Hyperlipidemia     Venous insufficiency     Coronary artery disease     Adrenal mass (H)     ACP (advance care planning)     Health Care Home     Morbid obesity (H)     Supraventricular tachycardia (H)       Past Surgical History:   Procedure Laterality Date     COLONOSCOPY       COLONOSCOPY  5/15/2012    Procedure:COLONOSCOPY; COLONOSCOPY; Surgeon:ALICIA HART; Location: GI     ESOPHAGOSCOPY, GASTROSCOPY, DUODENOSCOPY (EGD), COMBINED N/A 5/2/2018    Procedure: COMBINED ESOPHAGOSCOPY, GASTROSCOPY, DUODENOSCOPY (EGD);  ESOPHAGOSCOPY, GASTROSCOPY, DUODENOSCOPY with biopsies, and esophogeal dilation. ;  Surgeon: Mic Gilman MD;  Location:  OR     GENITOURINARY SURGERY  1989    lithrotripsy     ORTHOPEDIC SURGERY  1989    laminectomy L 4-5     ORTHOPEDIC SURGERY  1996    disectomy       "VASCULAR SURGERY      vein stripping       Social History     Tobacco Use     Smoking status: Never Smoker     Smokeless tobacco: Never Used   Substance Use Topics     Alcohol use: No       Most Recent Immunizations   Administered Date(s) Administered     Influenza (High Dose) 3 valent vaccine 11/08/2018     Influenza (IIV3) PF 10/14/2013     Influenza Quad, Recombinant, p-free (RIV4) 10/31/2019     Influenza Vaccine IM Ages 6-35 Months 4 Valent (PF) 10/16/2012     Influenza Vaccine, 6+MO IM (QUADRIVALENT W/PRESERVATIVES) 11/08/2018     Pneumo Conj 13-V (2010&after) 08/28/2001     Pneumococcal 23 valent 06/17/2009     TDAP Vaccine (Boostrix) 06/09/2008     Zoster vaccine, live 03/17/2007       BMI: Estimated body mass index is 46.31 kg/m  as calculated from the following:    Height as of 5/5/20: 1.854 m (6' 1\").    Weight as of 5/5/20: 159.2 kg (351 lb).     Inspection: normal cervical lordosis  Tender:  right paracervical muscles  tender:  medial border of scapula  Range of Motion:  extension: decreased, right lateral rotation:  decreased  Strength: Full strength of all neck muscles  Special tests:  Reproduction of radicular pattern    Xray nml    (G54.2) Cervical nerve root impingement  (primary encounter diagnosis)  Comment: nerve impingement  Plan: XR Cervical Spine 2/3 Views, PHYSICAL THERAPY         REFERRAL        Ice therapy   Tylenol ES                "

## 2020-05-12 NOTE — NURSING NOTE
Peterson is here for back pain    Pre-visit Screening:  Immunizations:  up to date  Colonoscopy:  is up to date  Mammogram: NA  Asthma Action Test/Plan:  NA  PHQ9:  NA  GAD7:  NA  Questioned patient about current smoking habits Pt. has never smoked.  Ok to leave detailed message on voice mail for today's visit only Yes, phone # [phone]

## 2020-05-12 NOTE — TELEPHONE ENCOUNTER
Since we didn't look at this when he was in recommend a F/U apptas could be many causes. Pt can always take ES tylenol at night along with his other meds Dr CAPPS

## 2020-05-22 ENCOUNTER — TELEPHONE (OUTPATIENT)
Dept: FAMILY MEDICINE | Facility: CLINIC | Age: 75
End: 2020-05-22

## 2020-05-22 NOTE — TELEPHONE ENCOUNTER
Orders for plan of care from [facility] for you to review and sign      Thanks,Stephania CAPPS

## 2020-06-12 DIAGNOSIS — E11.8 TYPE 2 DIABETES MELLITUS WITH COMPLICATION, WITHOUT LONG-TERM CURRENT USE OF INSULIN (H): Primary | ICD-10-CM

## 2020-06-12 NOTE — TELEPHONE ENCOUNTER
Jovi Aldana is requesting a refill of:    Pending Prescriptions:                       Disp   Refills    blood glucose monitoring (NO BRAND SPECIF*1 kit  0            Sig: Use to test blood sugar one times daily or as           directed.    Pt needs new meter as his broke.    Thanks,Stephania

## 2020-06-15 DIAGNOSIS — I10 ESSENTIAL HYPERTENSION, BENIGN: Primary | ICD-10-CM

## 2020-06-15 NOTE — TELEPHONE ENCOUNTER
Jovi Aldana is requesting a refill of:    Pending Prescriptions:                       Disp   Refills    lisinopril (ZESTRIL) 40 MG tablet         90 tab*1            Sig: Take 1 tablet (40 mg) by mouth daily    Potassium   Date Value Ref Range Status   05/05/2020 5.11 3.5 - 5.3 mmol/L Final

## 2020-06-16 ENCOUNTER — TRANSFERRED RECORDS (OUTPATIENT)
Dept: FAMILY MEDICINE | Facility: CLINIC | Age: 75
End: 2020-06-16

## 2020-06-16 RX ORDER — LISINOPRIL 40 MG/1
40 TABLET ORAL DAILY
Qty: 90 TABLET | Refills: 1 | Status: SHIPPED | OUTPATIENT
Start: 2020-06-16 | End: 2020-12-15

## 2020-07-07 ENCOUNTER — TRANSFERRED RECORDS (OUTPATIENT)
Dept: FAMILY MEDICINE | Facility: CLINIC | Age: 75
End: 2020-07-07

## 2020-07-07 DIAGNOSIS — R60.9 EDEMA, UNSPECIFIED TYPE: Primary | ICD-10-CM

## 2020-07-07 RX ORDER — FUROSEMIDE 20 MG
20 TABLET ORAL DAILY
Qty: 90 TABLET | Refills: 1 | Status: SHIPPED | OUTPATIENT
Start: 2020-07-07 | End: 2020-12-15

## 2020-07-07 RX ORDER — SPIRONOLACTONE 25 MG/1
25 TABLET ORAL DAILY
Qty: 90 TABLET | Refills: 1 | Status: SHIPPED | OUTPATIENT
Start: 2020-07-07 | End: 2020-12-15

## 2020-07-07 NOTE — TELEPHONE ENCOUNTER
Jovi Aldana is requesting a refill of:    Pending Prescriptions:                       Disp   Refills    spironolactone (ALDACTONE) 25 MG tablet   90 tab*1            Sig: Take 1 tablet (25 mg) by mouth daily    furosemide (LASIX) 20 MG tablet           90 tab*1            Sig: Take 1 tablet (20 mg) by mouth daily    Potassium   Date Value Ref Range Status   05/05/2020 5.11 3.5 - 5.3 mmol/L Final

## 2020-07-09 ENCOUNTER — TELEPHONE (OUTPATIENT)
Dept: FAMILY MEDICINE | Facility: CLINIC | Age: 75
End: 2020-07-09

## 2020-07-09 NOTE — TELEPHONE ENCOUNTER
Patient called to say you gave him a sample of Janumet and it seems to be working great.  They would like a prescription sent to [facility] pharmacy and he said a 30 day will work best financially.    Patient's phone #[phone]

## 2020-07-13 ENCOUNTER — TELEPHONE (OUTPATIENT)
Dept: FAMILY MEDICINE | Facility: CLINIC | Age: 75
End: 2020-07-13

## 2020-07-13 DIAGNOSIS — E11.8 TYPE 2 DIABETES MELLITUS WITH COMPLICATION, WITHOUT LONG-TERM CURRENT USE OF INSULIN (H): Primary | ICD-10-CM

## 2020-07-20 ENCOUNTER — TELEPHONE (OUTPATIENT)
Dept: UROLOGY | Facility: CLINIC | Age: 75
End: 2020-07-20

## 2020-07-20 NOTE — TELEPHONE ENCOUNTER
Called to screen for CODVID-19 symptoms prior to tomorrow's visit.  Patient does not have any symptoms at this time.    Gianna Sandy, EMT

## 2020-07-21 ENCOUNTER — OFFICE VISIT (OUTPATIENT)
Dept: UROLOGY | Facility: CLINIC | Age: 75
End: 2020-07-21
Payer: COMMERCIAL

## 2020-07-21 VITALS
HEIGHT: 73 IN | WEIGHT: 315 LBS | SYSTOLIC BLOOD PRESSURE: 150 MMHG | BODY MASS INDEX: 41.75 KG/M2 | DIASTOLIC BLOOD PRESSURE: 72 MMHG

## 2020-07-21 DIAGNOSIS — N48.89 PENILE PAIN: Primary | ICD-10-CM

## 2020-07-21 DIAGNOSIS — R31.29 MICROSCOPIC HEMATURIA: ICD-10-CM

## 2020-07-21 LAB
ALBUMIN UR-MCNC: NEGATIVE MG/DL
APPEARANCE UR: CLEAR
BILIRUB UR QL STRIP: NEGATIVE
COLOR UR AUTO: YELLOW
GLUCOSE UR STRIP-MCNC: 100 MG/DL
HGB UR QL STRIP: ABNORMAL
HYALINE CASTS #/AREA URNS LPF: 3 /LPF (ref 0–2)
KETONES UR STRIP-MCNC: NEGATIVE MG/DL
LEUKOCYTE ESTERASE UR QL STRIP: NEGATIVE
MUCOUS THREADS #/AREA URNS LPF: PRESENT /LPF
NITRATE UR QL: NEGATIVE
PH UR STRIP: 5.5 PH (ref 5–7)
RBC #/AREA URNS AUTO: 4 /HPF (ref 0–2)
RESIDUAL VOLUME (RV) (EXTERNAL): 33
SOURCE: ABNORMAL
SP GR UR STRIP: 1.01 (ref 1–1.03)
UROBILINOGEN UR STRIP-ACNC: 0.2 EU/DL (ref 0.2–1)
WBC #/AREA URNS AUTO: 1 /HPF (ref 0–5)

## 2020-07-21 PROCEDURE — 99202 OFFICE O/P NEW SF 15 MIN: CPT | Mod: 25 | Performed by: UROLOGY

## 2020-07-21 PROCEDURE — 81001 URINALYSIS AUTO W/SCOPE: CPT | Performed by: UROLOGY

## 2020-07-21 PROCEDURE — 51798 US URINE CAPACITY MEASURE: CPT | Performed by: UROLOGY

## 2020-07-21 ASSESSMENT — MIFFLIN-ST. JEOR: SCORE: 2339.25

## 2020-07-21 ASSESSMENT — PAIN SCALES - GENERAL: PAINLEVEL: MODERATE PAIN (5)

## 2020-07-21 NOTE — LETTER
7/21/2020      RE: Jovi Aldana  [address] [facility] [id number]       The patient is a 75-year-old male with penile pain in January that improved and again starting in May.  He has this constantly at a low level and this can be worse with urination.  Currently he is not sexually active and is not having ejaculations.  He has had no dysuria or hematuria.  He denies any flank pain or low back pain related to his penile pain.  He has had no history of prostatitis or urinary tract infections.  He has a history of stones but a CT scan in 2016 and an ultrasound in 2018 showed no renal stones.  He does have bilateral renal cysts.  Other past medical history: Trigeminal neuralgia, TMJ pain, type 2 diabetes, hypertension, arthritis, sleep apnea, hyperlipidemia, venous insufficiency, coronary artery disease, left adrenal mass benign, morbid obesity, SVT, non-smoker.  Surgeries reviewed  Medicines: Norvasc, baby aspirin, baclofen, Tegretol, Temovate ointment, desonide cream, Allegra, Flonase spray, Lasix, gabapentin, Nizoral cream, Zestril, magnesium, niacin, omeprazole, psyllium, Janumet Spironolactone, vitamin D, vitamin E  21 allergies listed  Has family history of prostate cancer- PSAs at [facility] have apparently been normal.  I do not see a PSA since 2016.  Has not had a digital rectal exam recently.  Exam: Morbidly obese, with wife, alert and oriented, normal respirations, neuro grossly intact.  Normal sphincter tone, no rectal mass or impaction, benign and symmetric prostate, cannot reach seminal vesicles  Urinalysis: Small red blood cells-sent for microscopic exam  Assessment: Penile pain, family history of prostate cancer  Plan: Microscopic urinalysis, PSA yearly in the future, yearly digital rectal exam.  Bacterial semen culture, office cystoscopy.  Patient has cholelithiasis and may need gallbladder removal-will be seen Dr. Smith.  Could do flexible cystoscopy under the same anesthetic      Saurav AYALA  MD [place]

## 2020-07-21 NOTE — LETTER
7/21/2020       RE: Jovi Aldana  [address] [facility] [id number]     Dear Colleague,    Thank you for referring your patient, Jovi Aldana, to the [facility] [place] at [facility]. Please see a copy of my visit note below.    The patient is a 75-year-old male with penile pain in January that improved and again starting in May.  He has this constantly at a low level and this can be worse with urination.  Currently he is not sexually active and is not having ejaculations.  He has had no dysuria or hematuria.  He denies any flank pain or low back pain related to his penile pain.  He has had no history of prostatitis or urinary tract infections.  He has a history of stones but a CT scan in 2016 and an ultrasound in 2018 showed no renal stones.  He does have bilateral renal cysts.  Other past medical history: Trigeminal neuralgia, TMJ pain, type 2 diabetes, hypertension, arthritis, sleep apnea, hyperlipidemia, venous insufficiency, coronary artery disease, left adrenal mass benign, morbid obesity, SVT, non-smoker.  Surgeries reviewed  Medicines: Norvasc, baby aspirin, baclofen, Tegretol, Temovate ointment, desonide cream, Allegra, Flonase spray, Lasix, gabapentin, Nizoral cream, Zestril, magnesium, niacin, omeprazole, psyllium, Janumet Spironolactone, vitamin D, vitamin E  21 allergies listed  Has family history of prostate cancer- PSAs at [facility] have apparently been normal.  I do not see a PSA since 2016.  Has not had a digital rectal exam recently.  Exam: Morbidly obese, with wife, alert and oriented, normal respirations, neuro grossly intact.  Normal sphincter tone, no rectal mass or impaction, benign and symmetric prostate, cannot reach seminal vesicles  Urinalysis: Small red blood cells-sent for microscopic exam  Assessment: Penile pain, family history of prostate cancer  Plan: Microscopic urinalysis, PSA yearly in the  future, yearly digital rectal exam.  Bacterial semen culture, office cystoscopy.  Patient has cholelithiasis and may need gallbladder removal-will be seen Dr. Smith.  Could do flexible cystoscopy under the same anesthetic    Sincerely,    Saurav Mane MD

## 2020-07-21 NOTE — NURSING NOTE
Chief Complaint   Patient presents with     Penile Pain     Pain in penis, since January 2020.  Always present, but increases with urgency and urination.  Radiates from tip to underneath penis.   Describes as an aching or sharp pain.    PVR: 33      Gianna Sandy, EMT

## 2020-07-21 NOTE — PROGRESS NOTES
The patient is a 75-year-old male with penile pain in January that improved and again starting in May.  He has this constantly at a low level and this can be worse with urination.  Currently he is not sexually active and is not having ejaculations.  He has had no dysuria or hematuria.  He denies any flank pain or low back pain related to his penile pain.  He has had no history of prostatitis or urinary tract infections.  He has a history of stones but a CT scan in 2016 and an ultrasound in 2018 showed no renal stones.  He does have bilateral renal cysts.  Other past medical history: Trigeminal neuralgia, TMJ pain, type 2 diabetes, hypertension, arthritis, sleep apnea, hyperlipidemia, venous insufficiency, coronary artery disease, left adrenal mass benign, morbid obesity, SVT, non-smoker.  Surgeries reviewed  Medicines: Norvasc, baby aspirin, baclofen, Tegretol, Temovate ointment, desonide cream, Allegra, Flonase spray, Lasix, gabapentin, Nizoral cream, Zestril, magnesium, niacin, omeprazole, psyllium, Janumet Spironolactone, vitamin D, vitamin E  21 allergies listed  Has family history of prostate cancer- PSAs at [facility] have apparently been normal.  I do not see a PSA since 2016.  Has not had a digital rectal exam recently.  Exam: Morbidly obese, with wife, alert and oriented, normal respirations, neuro grossly intact.  Normal sphincter tone, no rectal mass or impaction, benign and symmetric prostate, cannot reach seminal vesicles  Urinalysis: Small red blood cells-sent for microscopic exam  Assessment: Penile pain, family history of prostate cancer  Plan: Microscopic urinalysis, PSA yearly in the future, yearly digital rectal exam.  Bacterial semen culture, office cystoscopy.  Patient has cholelithiasis and may need gallbladder removal-will be seen Dr. Smith.  Could do flexible cystoscopy under the same anesthetic

## 2020-07-23 DIAGNOSIS — I10 ESSENTIAL HYPERTENSION: Primary | ICD-10-CM

## 2020-07-24 ENCOUNTER — PREP FOR PROCEDURE (OUTPATIENT)
Dept: SURGERY | Facility: CLINIC | Age: 75
End: 2020-07-24

## 2020-07-24 ENCOUNTER — VIRTUAL VISIT (OUTPATIENT)
Dept: SURGERY | Facility: CLINIC | Age: 75
End: 2020-07-24
Payer: COMMERCIAL

## 2020-07-24 ENCOUNTER — TELEPHONE (OUTPATIENT)
Dept: SURGERY | Facility: CLINIC | Age: 75
End: 2020-07-24

## 2020-07-24 VITALS — WEIGHT: 315 LBS | HEIGHT: 73 IN | BODY MASS INDEX: 41.75 KG/M2

## 2020-07-24 DIAGNOSIS — N48.89 PENILE PAIN: ICD-10-CM

## 2020-07-24 DIAGNOSIS — K81.9 CHOLECYSTITIS: Primary | ICD-10-CM

## 2020-07-24 DIAGNOSIS — Z11.59 ENCOUNTER FOR SCREENING FOR OTHER VIRAL DISEASES: Primary | ICD-10-CM

## 2020-07-24 PROCEDURE — 99203 OFFICE O/P NEW LOW 30 MIN: CPT | Mod: 95 | Performed by: SURGERY

## 2020-07-24 RX ORDER — AMLODIPINE BESYLATE 2.5 MG/1
2.5 TABLET ORAL DAILY
Qty: 90 TABLET | Refills: 1 | Status: SHIPPED | OUTPATIENT
Start: 2020-07-24 | End: 2021-01-22

## 2020-07-24 ASSESSMENT — MIFFLIN-ST. JEOR: SCORE: 2339.25

## 2020-07-24 NOTE — TELEPHONE ENCOUNTER
Type of surgery: LAPAROSCOPIC CHOLECYSTECTOMY   Location of surgery: [facility]  Date and time of surgery: 8-11-20, 12:45 PM   Surgeon: DR. MCGRATH   Pre-Op Appt Date: PATIENT TO SCHEDULE   Post-Op Appt Date: PATIENT TO SCHEDULE    Packet sent out: Yes  Pre-cert/Authorization completed:  Not Applicable  Date: 7-24-20         *coord case* (Dr. Mane ts w/ cystoscopy 15 mins)  LAPAROSCOPIC CHOLECYSTECTOMY   GENERAL   PT INST TO HAVE H&P WITH DR. FELTON  75 MINS REQ   PA ASSIST DFB   ALW   225

## 2020-07-24 NOTE — PROGRESS NOTES
Review Of Systems  Skin: negative  Eyes: negative  Ears/Nose/Throat: negative  Respiratory: No shortness of breath, dyspnea on exertion, cough, or hemoptysis  Cardiovascular: negative  Gastrointestinal: abdominal pain, tenderness and diarrhea  Genitourinary: negative  Musculoskeletal: negative  Neurologic: negative  Psychiatric: negative  Hematologic/Lymphatic/Immunologic: negative  Endocrine: negative    Patient states he is having pain in the right upper abdomin and he states it radiates into the back and sometimes the other side.        Jovi Aldana is a 75 year old male who is being evaluated via a billable telephone visit.      Chief complaint:  Abdominal pain, right upper quadrant    HPI:  This patient is a 75 year old male who presents with intermittent episodes of right upper quadrant pain, sometimes radiating to the back.  These have been occurring over the last couple of years.  They tend to last about 2 weeks.  The patient had an ultrasound in December 2019 which revealed a large gallstone, but no wall thickening.  The patient has been trying to eat a low-fat diet, but has recently been having pain once again.  He does feel that fatty foods make his symptoms worse.  The patient has had some diarrhea when he is having symptoms.    Past Medical History:   has a past medical history of Adrenal mass (H), Arthritis, Complication of anesthesia, Coronary artery disease, Diabetes mellitus (H), Gout, Heartburn, Hyperlipidemia, Hypertension, Mumps, PONV (postoperative nausea and vomiting), Renal disease, Sleep apnea, and Venous insufficiency.    Past Surgical History:  Past Surgical History:   Procedure Laterality Date     COLONOSCOPY       COLONOSCOPY  5/15/2012    Procedure:COLONOSCOPY; COLONOSCOPY; Surgeon:ALICIA HART; Location:[facility]     ESOPHAGOSCOPY, GASTROSCOPY, DUODENOSCOPY (EGD), COMBINED N/A 5/2/2018    Procedure: COMBINED ESOPHAGOSCOPY, GASTROSCOPY, DUODENOSCOPY (EGD);  ESOPHAGOSCOPY, GASTROSCOPY,  DUODENOSCOPY with biopsies, and esophogeal dilation. ;  Surgeon: Mic Gilman MD;  Location: [facility]     GENITOURINARY SURGERY  1989    lithrotripsy     ORTHOPEDIC SURGERY  1989    laminectomy L 4-5     ORTHOPEDIC SURGERY  1996    disectomy      VASCULAR SURGERY      vein stripping        Social History:  Social History     Socioeconomic History     Marital status:      Spouse name: Not on file     Number of children: Not on file     Years of education: Not on file     Highest education level: Not on file   Occupational History     Not on file   Social Needs     Financial resource strain: Not on file     Food insecurity     Worry: Not on file     Inability: Not on file     Transportation needs     Medical: Not on file     Non-medical: Not on file   Tobacco Use     Smoking status: Never Smoker     Smokeless tobacco: Never Used   Substance and Sexual Activity     Alcohol use: No     Drug use: Never     Sexual activity: Not on file   Lifestyle     Physical activity     Days per week: Not on file     Minutes per session: Not on file     Stress: Not on file   Relationships     Social connections     Talks on phone: Not on file     Gets together: Not on file     Attends [employer] service: Not on file     Active member of club or organization: Not on file     Attends meetings of clubs or organizations: Not on file     Relationship status: Not on file     Intimate partner violence     Fear of current or ex partner: Not on file     Emotionally abused: Not on file     Physically abused: Not on file     Forced sexual activity: Not on file   Other Topics Concern     Parent/sibling w/ CABG, MI or angioplasty before 65F 55M? Not Asked      Service Not Asked     Blood Transfusions Not Asked     Caffeine Concern Not Asked     Occupational Exposure Not Asked     Hobby Hazards Not Asked     Sleep Concern Not Asked     Stress Concern Not Asked     Weight Concern Not Asked     Special Diet No     Back Care Not Asked      "Exercise Yes     Bike Helmet Not Asked     Seat Belt Not Asked     Self-Exams Not Asked   Social History Narrative     Not on file        Family History:  Family History   Problem Relation Age of Onset     Prostate Cancer Father      Coronary Artery Disease No family hx of        Review of Systems:  The 10 point Review of Systems is negative other than noted in the HPI and above.    Physical Exam:  Physical exam is deferred due to a telephone visit.  The patient is conversant and shows good insight into his situation.    Relevant labs:  No recent labs    Imaging:  Ultrasound showed a 2.4 cm mobile gallstone.  No evidence of wall thickening or ductal dilatation.    Assessment and Plan: This is a patient with what is almost certainly recurring cholecystitis.  We had a long discussion over the phone regarding the patient's symptoms and laparoscopic cholecystectomy.  I have recommended that we go ahead with laparoscopic cholecystectomy.  With the patient has very severe symptoms, he should present to the emergency room.  We discussed the procedure, along with its risks and complications, in detail.  The patient has agreed to proceed and would like to move forward with scheduling.  There is some possibility that he would like to coordinate this with a cystoscopy by Dr. Saurav Mane.    Floyd Smith MD  [facility]    Please route or send letter to:  Primary Care Provider (PCP)      The patient has been notified of following:     \"This telephone visit will be conducted via a call between you and your physician/provider. We have found that certain health care needs can be provided without the need for a physical exam.  This service lets us provide the care you need with a short phone conversation.  If a prescription is necessary we can send it directly to your pharmacy.  If lab work is needed we can place an order for that and you can then stop by our lab to have the test done at a later time.    Telephone " "visits are billed at different rates depending on your insurance coverage. During this emergency period, for some insurers they may be billed the same as an in-person visit.  Please reach out to your insurance provider with any questions.    If during the course of the call the physician/provider feels a telephone visit is not appropriate, you will not be charged for this service.\"    Patient has given verbal consent for Telephone visit?  Yes    What phone number would you like to be contacted at? [phone]  Estela his wife is also on the call with him.        Phone call duration: 27 minutes    Folyd Smith MD      "

## 2020-07-24 NOTE — LETTER
2020    RE: Jovi Aldana, : 1945      Patient states he is having pain in the right upper abdomin and he states it radiates into the back and sometimes the other side.        Jovi Aldana is a 75 year old male who is being evaluated via a billable telephone visit.       Chief complaint:  Abdominal pain, right upper quadrant     HPI:  This patient is a 75 year old male who presents with intermittent episodes of right upper quadrant pain, sometimes radiating to the back.  These have been occurring over the last couple of years.  They tend to last about 2 weeks.  The patient had an ultrasound in 2019 which revealed a large gallstone, but no wall thickening.  The patient has been trying to eat a low-fat diet, but has recently been having pain once again.  He does feel that fatty foods make his symptoms worse.  The patient has had some diarrhea when he is having symptoms.     Past Medical History:  Has a past medical history of Adrenal mass (H), Arthritis, Complication of anesthesia, Coronary artery disease, Diabetes mellitus (H), Gout, Heartburn, Hyperlipidemia, Hypertension, Mumps, PONV (postoperative nausea and vomiting), Renal disease, Sleep apnea, and Venous insufficiency.     Review of Systems:  The 10 point Review of Systems is negative other than noted in the HPI and above.     Physical Exam:  Physical exam is deferred due to a telephone visit.  The patient is conversant and shows good insight into his situation.     Relevant labs:  No recent labs     Imaging:  Ultrasound showed a 2.4 cm mobile gallstone.  No evidence of wall thickening or ductal dilatation.     Assessment and Plan: This is a patient with what is almost certainly recurring cholecystitis.  We had a long discussion over the phone regarding the patient's symptoms and laparoscopic cholecystectomy.  I have recommended that we go ahead with laparoscopic cholecystectomy.  With the patient has very severe symptoms, he  should present to the emergency room.  We discussed the procedure, along with its risks and complications, in detail.  The patient has agreed to proceed and would like to move forward with scheduling.  There is some possibility that he would like to coordinate this with a cystoscopy by Dr. Saurav Mane.     Floyd Smith MD  [facility]

## 2020-08-04 ENCOUNTER — OFFICE VISIT (OUTPATIENT)
Dept: FAMILY MEDICINE | Facility: CLINIC | Age: 75
End: 2020-08-04

## 2020-08-04 VITALS
SYSTOLIC BLOOD PRESSURE: 180 MMHG | OXYGEN SATURATION: 98 % | HEART RATE: 86 BPM | RESPIRATION RATE: 24 BRPM | BODY MASS INDEX: 41.75 KG/M2 | DIASTOLIC BLOOD PRESSURE: 82 MMHG | HEIGHT: 73 IN | WEIGHT: 315 LBS | TEMPERATURE: 97.7 F

## 2020-08-04 DIAGNOSIS — E11.8 TYPE 2 DIABETES MELLITUS WITH COMPLICATION, WITHOUT LONG-TERM CURRENT USE OF INSULIN (H): ICD-10-CM

## 2020-08-04 DIAGNOSIS — K81.1 CHRONIC CHOLECYSTITIS: ICD-10-CM

## 2020-08-04 DIAGNOSIS — I10 ESSENTIAL HYPERTENSION: ICD-10-CM

## 2020-08-04 DIAGNOSIS — Z01.818 PRE-OP EXAM: Primary | ICD-10-CM

## 2020-08-04 DIAGNOSIS — G47.33 OBSTRUCTIVE SLEEP APNEA SYNDROME: ICD-10-CM

## 2020-08-04 PROBLEM — M79.18 MYOFASCIAL PAIN: Status: ACTIVE | Noted: 2017-09-13

## 2020-08-04 PROBLEM — I63.22: Status: ACTIVE | Noted: 2017-02-15

## 2020-08-04 LAB
BUN SERPL-MCNC: 9 MG/DL (ref 7–25)
BUN/CREATININE RATIO: 11 (ref 6–22)
CALCIUM SERPL-MCNC: 9.6 MG/DL (ref 8.6–10.3)
CHLORIDE SERPLBLD-SCNC: 96.7 MMOL/L (ref 98–110)
CO2 SERPL-SCNC: 32.9 MMOL/L (ref 20–32)
CREAT SERPL-MCNC: 0.82 MG/DL (ref 0.7–1.18)
ERYTHROCYTE [DISTWIDTH] IN BLOOD BY AUTOMATED COUNT: 12.1 %
GLUCOSE SERPL-MCNC: 211 MG/DL (ref 60–99)
HBA1C MFR BLD: 7.2 % (ref 4–7)
HCT VFR BLD AUTO: 41.8 % (ref 40–53)
HEMOGLOBIN: 13.9 G/DL (ref 13.3–17.7)
MCH RBC QN AUTO: 32.7 PG (ref 26–33)
MCHC RBC AUTO-ENTMCNC: 33.3 G/DL (ref 31–36)
MCV RBC AUTO: 98.3 FL (ref 78–100)
PLATELET COUNT - QUEST: 288 10^9/L (ref 150–375)
POTASSIUM SERPL-SCNC: 4.42 MMOL/L (ref 3.5–5.3)
RBC # BLD AUTO: 4.25 10*12/L (ref 4.4–5.9)
SODIUM SERPL-SCNC: 137.2 MMOL/L (ref 135–146)
WBC # BLD AUTO: 6.4 10*9/L (ref 4–11)

## 2020-08-04 PROCEDURE — 99215 OFFICE O/P EST HI 40 MIN: CPT | Mod: 25 | Performed by: FAMILY MEDICINE

## 2020-08-04 PROCEDURE — 93000 ELECTROCARDIOGRAM COMPLETE: CPT | Performed by: FAMILY MEDICINE

## 2020-08-04 PROCEDURE — 36415 COLL VENOUS BLD VENIPUNCTURE: CPT | Performed by: FAMILY MEDICINE

## 2020-08-04 PROCEDURE — 80048 BASIC METABOLIC PNL TOTAL CA: CPT | Performed by: FAMILY MEDICINE

## 2020-08-04 PROCEDURE — 85027 COMPLETE CBC AUTOMATED: CPT | Performed by: FAMILY MEDICINE

## 2020-08-04 PROCEDURE — 83036 HEMOGLOBIN GLYCOSYLATED A1C: CPT | Performed by: FAMILY MEDICINE

## 2020-08-04 ASSESSMENT — MIFFLIN-ST. JEOR: SCORE: 2343.79

## 2020-08-04 NOTE — PROGRESS NOTES
[facility]  [address]  [address]  [facility] [address]  [phone]  Dept: [phone]    PRE-OP EVALUATION:  Today's date: 2020    Jovi Aldana (: 1945) presents for pre-operative evaluation assessment as requested by Dr. Smith.  He requires evaluation and anesthesia risk assessment prior to undergoing surgery/procedure for treatment of gallbladder.    Proposed Surgery/ Procedure: CYSTOSCOPY and LAPAROSCOPIC CHOLECYSTECTOMY  Date of Surgery/ Procedure: 2020  Time of Surgery/ Procedure: 12:50 pm  Hospital/Surgical Facility: [facility]  Surgery Fax Number: can see in epic   Primary Physician: Kemar Collins  Type of Anesthesia Anticipated: General    Preoperative Questionnaire:   YES - HAVE YOU EVER HAD A HEART ATTACK OR STROKE? Tiny TIA in the right eye 3 years ago  No - Have you ever had surgery on your heart or blood vessels, such as a stent, coronary (heart) bypass, or surgery on an artery in the head, neck, heart, or legs?  No - Do you have chest pain when you are physically active?  No - Do you have a history of heart failure?  No - Do you currently have a cold, bronchitis, or symptoms of other respiratory (head and chest) infections?  No - Do you have a cough, shortness of breath, or wheezing?  No - Do you or anyone in your family have a history of blood clots?  No - Do you or anyone in your family have a serious bleeding problem, such as long-lasting bleeding after surgeries or cuts?  No - Have you ever had anemia or been told to take iron pills?  No - Have you had any abnormal blood loss such as black, tarry or bloody stools, or abnormal vaginal bleeding?  No - Have you ever had a blood transfusion?  Yes - Are you willing to have a blood transfusion if it is medically needed before, during, or after your surgery?  No - Have you or anyone in your family ever had problems with anesthesia (sedation for surgery)?  YES - DO YOU HAVE SLEEP  APNEA, EXCESSIVE SNORING, OR DAYTIME DROWSINESS? mild DO YOU HAVE A CPAP MACHINE? yes  No - Do you have any artifical heart valves or other implanted medical devices, such as a pacemaker, defibrillator, or continuous glucose monitor?  No - Do you have any artifical joints?  No - Are you allergic to latex?  No - Is there any chance that you may be pregnant?    Patient has a Health Care Directive or Living Will:  NO    HPI:     HPI related to upcoming procedure: right sided abd pain intermittent with eating      DIABETES - Patient has a longstanding history of DiabetesType Type II . Patient is being treated with diet and oral agents and denies significant side effects. Control has been good. Complicating factors include but are not limited to: hypertension.     HYPERTENSION - Patient has longstanding history of HTN , currently denies any symptoms referable to elevated blood pressure. Specifically denies chest pain, palpitations, dyspnea, orthopnea, PND or peripheral edema. Blood pressure readings have been in normal range. Current medication regimen is as listed below. Patient denies any side effects of medication.     SLEEP PROBLEM - Patient has a longstanding history of snoring..      Pt on CPAP    MEDICAL HISTORY:     Patient Active Problem List    Diagnosis Date Noted     Cholecystitis 07/24/2020     Priority: Medium     Added automatically from request for surgery [id number]       Penile pain 07/24/2020     Priority: Medium     Added automatically from request for surgery [id number]       Morbid obesity (H) 05/05/2020     Priority: Medium     Supraventricular tachycardia (H) 05/05/2020     Priority: Medium     Hypertension      Priority: Medium     Type 2 diabetes mellitus with complication, without long-term current use of insulin (H)      Priority: Medium     Arthritis      Priority: Medium     Sleep apnea      Priority: Medium     CPAP       Hyperlipidemia      Priority: Medium     Venous insufficiency       "Priority: Medium     Coronary artery disease      Priority: Medium     Adrenal mass (H)      Priority: Medium     Trigeminal neuralgia 06/12/2013     Priority: Medium     ACP (advance care planning) 05/05/2020     Priority: Low     Health Care Home 05/05/2020     Priority: Low      Past Medical History:   Diagnosis Date     Adrenal mass (H)      Arthritis      Complication of anesthesia     \"hard time waking up\" after vein stripping     Coronary artery disease      Diabetes mellitus (H)      Gout      Heartburn      Hyperlipidemia      Hypertension      Mumps      PONV (postoperative nausea and vomiting)      Renal disease     stone     Sleep apnea     CPAP     Venous insufficiency      Past Surgical History:   Procedure Laterality Date     COLONOSCOPY       COLONOSCOPY  5/15/2012    Procedure:COLONOSCOPY; COLONOSCOPY; Surgeon:ALICIA HART; Location:[facility]     ESOPHAGOSCOPY, GASTROSCOPY, DUODENOSCOPY (EGD), COMBINED N/A 5/2/2018    Procedure: COMBINED ESOPHAGOSCOPY, GASTROSCOPY, DUODENOSCOPY (EGD);  ESOPHAGOSCOPY, GASTROSCOPY, DUODENOSCOPY with biopsies, and esophogeal dilation. ;  Surgeon: Mic Gilman MD;  Location: [facility]     GENITOURINARY SURGERY  1989    lithrotripsy     ORTHOPEDIC SURGERY  1989    laminectomy L 4-5     ORTHOPEDIC SURGERY  1996    disectomy      VASCULAR SURGERY      vein stripping     Current Outpatient Medications   Medication Sig Dispense Refill     amLODIPine (NORVASC) 2.5 MG tablet Take 1 tablet (2.5 mg) by mouth daily 90 tablet 1     aspirin 81 MG tablet Take 81 mg by mouth daily       BACLOFEN PO Take 10 mg by mouth daily       blood glucose monitoring (NO BRAND SPECIFIED) meter device kit Use to test blood sugar one times daily or as directed. 1 kit 0     carBAMazepine (TEGRETOL) 200 MG tablet Take 1 tablet (200 mg) by mouth 5 times daily 450 tablet 1     cetirizine (ZYRTEC) 10 MG tablet Take 10 mg by mouth daily       clobetasol (TEMOVATE) 0.05 % ointment Apply topically as needed    "     Desonide Crea-Wound Dress Crea (DESONIL CREAM EX) Externally apply 0.05 oz topically as needed Taro        fexofenadine (ALLEGRA) 180 MG tablet Take 180 mg by mouth daily       fluticasone (FLONASE) 50 MCG/ACT nasal spray        furosemide (LASIX) 20 MG tablet Take 1 tablet (20 mg) by mouth daily 90 tablet 1     gabapentin (NEURONTIN) 300 MG capsule Take 300 mg by mouth 4 times daily       ketoconazole (NIZORAL) 2 % external cream Apply topically daily       lisinopril (ZESTRIL) 40 MG tablet Take 1 tablet (40 mg) by mouth daily 90 tablet 1     Magnesium 400 MG CAPS Take by mouth 2 times daily       metFORMIN (GLUCOPHAGE-XR) 500 MG 24 hr tablet Take 2 tablets (1,000 mg) by mouth 2 times daily (with meals) 360 tablet 3     niacin (SLO-NIACIN) 250 MG TBCR CR tablet Take 250 mg by mouth       OMEPRAZOLE PO Take 40 mg by mouth 2 times daily (before meals)       Polyethylene Glycol 3350 (MIRALAX PO) Take 1 capful by mouth Mixed in water once daily as needed.       PSYLLIUM HUSK PO Take 750 mg by mouth       senna-docusate (SENOKOT-S/PERICOLACE) 8.6-50 MG tablet Take 2 tablets by mouth 2 times daily       sitagliptin (JANUVIA) 100 MG tablet Take 100 mg by mouth daily        sitagliptin-metFORMIN (JANUMET)  MG tablet Take 1 tablet by mouth 2 times daily (with meals) 60 tablet 11     spironolactone (ALDACTONE) 25 MG tablet Take 1 tablet (25 mg) by mouth daily 90 tablet 1     UNABLE TO FIND MEDICATION NAME: mometasone, hyaluronic acid compound from dermatologist.   Applying topically to areas of itchy skin, per patient.       VITAMIN D, CHOLECALCIFEROL, PO Take 5,000 Units by mouth 2 times daily       vitamin E 400 UNITS TABS Take 1 tablet by mouth daily.       OTC products: None, except as noted above    Allergies   Allergen Reactions     Arthrotec      Atorvastatin      Muscle aches     Augmentin Diarrhea     Carvedilol      Patient had arthralgias, he attributed to carvedilol.     Celebrex [Celecoxib]       "Stomach pain     Colestipol      Muscle pain, occurred in Jan 2011     Cozaar [Losartan]      Crestor [Rosuvastatin]      Gemfibrozil      Glucotrol [Glipizide]      Hmg-Coa-R Inhibitors      Muscle Pain     Ibuprofen      Bloody noses a couple of times on this medication.  Occurred in 12/2011     Nabumetone      Relafin - stomach pain     Oxycodone-Acetaminophen Other (See Comments)     Patient states it makes him\"loopy\"     Ozempic [Semaglutide]      trialed x several weeks, extreme nausea.     Vioxx [Rofecoxib]      Zocor [Simvastatin]      Erythromycin Rash     Ketoconazole Rash     Was to treat a rash and the rash worsened.     Sulfabenzamide Rash     Zetia [Ezetimibe] Rash     Happened in 2006      Latex Allergy: NO    Social History     Tobacco Use     Smoking status: Never Smoker     Smokeless tobacco: Never Used   Substance Use Topics     Alcohol use: No     History   Drug Use Unknown       REVIEW OF SYSTEMS:   CONSTITUTIONAL: NEGATIVE for fever, chills, change in weight  INTEGUMENTARY/SKIN: NEGATIVE for worrisome rashes, moles or lesions  EYES: NEGATIVE for vision changes or irritation  ENT/MOUTH: NEGATIVE for ear, mouth and throat problems  RESP: NEGATIVE for significant cough or SOB  BREAST: NEGATIVE for masses, tenderness or discharge  CV: NEGATIVE for chest pain, palpitations or peripheral edema  GI: NEGATIVE , heartburn, or change in bowel habits positive RUQ pain assoc with nausea  : NEGATIVE for frequency, dysuria, or hematuria  MUSCULOSKELETAL: NEGATIVE for significant arthralgias or myalgia  NEURO: NEGATIVE for weakness, dizziness or paresthesias  ENDOCRINE: NEGATIVE for temperature intolerance, skin/hair changes  HEME: NEGATIVE for bleeding problems  PSYCHIATRIC: NEGATIVE for changes in mood or affect    EXAM:   There were no vitals taken for this visit.    GENERAL APPEARANCE: healthy, alert and no distress     EYES: EOMI,  PERRL     HENT: ear canals and TM's normal and nose and mouth without " ulcers or lesions     NECK: no adenopathy, no asymmetry, masses, or scars and thyroid normal to palpation     RESP: lungs clear to auscultation - no rales, rhonchi or wheezes     CV: regular rates and rhythm, normal S1 S2, no S3 or S4 and no murmur, click or rub  2+ edema bilateral chronic     ABDOMEN:  soft, nontender, no HSM or masses and bowel sounds normal     MS: extremities normal- no gross deformities noted, no evidence of inflammation in joints, FROM in all extremities.     SKIN: no suspicious lesions or rashes     NEURO: Normal strength and tone, sensory exam grossly normal, mentation intact and speech normal     PSYCH: mentation appears normal. and affect normal/bright     LYMPHATICS: No cervical adenopathy    DIAGNOSTICS:   EKG: appears normal, NSR, normal axis, normal intervals, no acute ST/T changes c/w ischemia, no LVH by voltage criteria, Right Bundle Branch Block, unchanged from previous tracings  Hemoglobin (indicated for history of anemia or procedure with significant blood loss such as tonsillectomy, major intraperitoneal surgery, vascular surgery, major spine surgery, total joint replacement)  Hemoglobin A1C    Recent Labs   Lab Test 05/05/20 01/10/19 01/30/17  0907 08/01/16 03/01/13  0835   HGB  --   --   --  13.5  13.5  --  13.3   PLT  --   --   --  224  224  --  241   .9  --  137 137  --  136   POTASSIUM 5.11  --  4.5 5  --  4.6   CR 0.84  --  0.71 0.82   < >  --    A1C 7.5* 7.5*  --  7.9   < >  --     < > = values in this interval not displayed.      Recent Results (from the past 24 hour(s))   HEMOGRAM/PLATELET    Collection Time: 08/04/20 12:00 AM   Result Value Ref Range    WBC 6.4 4.0 - 11 10*9/L    RBC Count 4.25 (A) 4.4 - 5.9 10*12/L    Hemoglobin 13.9 13.3 - 17.7 g/dL    Hematocrit 41.8 40.0 - 53.0 %    MCV 98.3 78 - 100 fL    MCH 32.7 26 - 33 pg    MCHC 33.3 31 - 36 g/dL    RDW 12.1 %    Platelet Count 288 150 - 375 10^9/L   Hemoglobin A1c    Collection Time: 08/04/20 12:00 AM    Result Value Ref Range    Hemoglobin A1C 7.2 (A) 4.0 - 7.0 %       IMPRESSION:   Reason for surgery/procedure: choliathiasis    The proposed surgical procedure is considered INTERMEDIATE risk.    REVISED CARDIAC RISK INDEX  The patient has the following serious cardiovascular risks for perioperative complications such as (MI, PE, VFib and 3  AV Block):  Cerebrovascular Disease (TIA or CVA)  INTERPRETATION: 1 risks: Class II (low risk - 0.9% complication rate)    The patient has the following additional risks for perioperative complications:  Morbid obesity  Sleep apnea      ICD-10-CM    1. Pre-op exam  Z01.818 HEMOGRAM/PLATELET     VENOUS COLLECTION   2. Chronic cholecystitis  K81.1    3. Obstructive sleep apnea syndrome  G47.33    4. Type 2 diabetes mellitus with complication, without long-term current use of insulin (H)  E11.8 Hemoglobin A1c   5. Essential hypertension  I10 Basic Metabolic Panel (BFP)     EKG 12-lead complete w/read - Clinics       RECOMMENDATIONS:       Obstructive Sleep Apnea (or suspected sleep apnea)  Patient is clearly advised to use their home CPAP when released from surgery      --Patient is to take all scheduled medications on the day of surgery EXCEPT for modifications listed below.    Anticoagulant or Antiplatelet Medication Use  ASPIRIN: Discontinue ASA 7-10 days prior to procedure to reduce bleeding risk.  It should be resumed post-operatively.        APPROVAL GIVEN to proceed with proposed procedure, without further diagnostic evaluation       Signed Electronically by: Kemar Collins MD    Copy of this evaluation report is provided to requesting physician.    [place] Preop Guidelines    Revised Cardiac Risk Index

## 2020-08-04 NOTE — NURSING NOTE
Chief Complaint   Patient presents with     Pre-Op Exam     DOS 8/11/20, Dr. Smith, [facility], gallbladder surgery

## 2020-08-07 DIAGNOSIS — Z11.59 ENCOUNTER FOR SCREENING FOR OTHER VIRAL DISEASES: ICD-10-CM

## 2020-08-07 PROCEDURE — U0003 INFECTIOUS AGENT DETECTION BY NUCLEIC ACID (DNA OR RNA); SEVERE ACUTE RESPIRATORY SYNDROME CORONAVIRUS 2 (SARS-COV-2) (CORONAVIRUS DISEASE [COVID-19]), AMPLIFIED PROBE TECHNIQUE, MAKING USE OF HIGH THROUGHPUT TECHNOLOGIES AS DESCRIBED BY CMS-2020-01-R: HCPCS | Performed by: SURGERY

## 2020-08-08 LAB
SARS-COV-2 RNA SPEC QL NAA+PROBE: NOT DETECTED
SPECIMEN SOURCE: NORMAL

## 2020-08-11 ENCOUNTER — ANESTHESIA EVENT (OUTPATIENT)
Dept: SURGERY | Facility: CLINIC | Age: 75
End: 2020-08-11
Payer: MEDICARE

## 2020-08-11 ENCOUNTER — APPOINTMENT (OUTPATIENT)
Dept: SURGERY | Facility: PHYSICIAN GROUP | Age: 75
End: 2020-08-11
Payer: COMMERCIAL

## 2020-08-11 ENCOUNTER — HOSPITAL ENCOUNTER (OUTPATIENT)
Facility: CLINIC | Age: 75
Discharge: HOME | End: 2020-08-11
Attending: SURGERY | Admitting: SURGERY
Payer: MEDICARE

## 2020-08-11 ENCOUNTER — SURGERY (OUTPATIENT)
Age: 75
End: 2020-08-11
Payer: COMMERCIAL

## 2020-08-11 ENCOUNTER — ANESTHESIA (OUTPATIENT)
Dept: SURGERY | Facility: CLINIC | Age: 75
End: 2020-08-11
Payer: MEDICARE

## 2020-08-11 VITALS
TEMPERATURE: 98.4 F | SYSTOLIC BLOOD PRESSURE: 119 MMHG | DIASTOLIC BLOOD PRESSURE: 55 MMHG | BODY MASS INDEX: 41.75 KG/M2 | WEIGHT: 315 LBS | HEIGHT: 73 IN | HEART RATE: 77 BPM | OXYGEN SATURATION: 93 % | RESPIRATION RATE: 16 BRPM

## 2020-08-11 DIAGNOSIS — K81.9 CHOLECYSTITIS: ICD-10-CM

## 2020-08-11 DIAGNOSIS — R31.9 HEMATURIA: Primary | ICD-10-CM

## 2020-08-11 DIAGNOSIS — K80.20 GALLSTONES: Primary | ICD-10-CM

## 2020-08-11 DIAGNOSIS — N48.89 PENILE PAIN: ICD-10-CM

## 2020-08-11 LAB
GLUCOSE BLDC GLUCOMTR-MCNC: 156 MG/DL (ref 70–99)
GLUCOSE BLDC GLUCOMTR-MCNC: 165 MG/DL (ref 70–99)

## 2020-08-11 PROCEDURE — 25000128 H RX IP 250 OP 636: Performed by: NURSE ANESTHETIST, CERTIFIED REGISTERED

## 2020-08-11 PROCEDURE — 40000306 ZZH STATISTIC PRE PROC ASSESS II: Performed by: SURGERY

## 2020-08-11 PROCEDURE — 36000056 ZZH SURGERY LEVEL 3 1ST 30 MIN: Performed by: SURGERY

## 2020-08-11 PROCEDURE — 25000128 H RX IP 250 OP 636: Performed by: ANESTHESIOLOGY

## 2020-08-11 PROCEDURE — 25800025 ZZH RX 258: Performed by: SURGERY

## 2020-08-11 PROCEDURE — 82962 GLUCOSE BLOOD TEST: CPT

## 2020-08-11 PROCEDURE — 25000125 ZZHC RX 250: Performed by: NURSE ANESTHETIST, CERTIFIED REGISTERED

## 2020-08-11 PROCEDURE — 25800030 ZZH RX IP 258 OP 636: Performed by: NURSE ANESTHETIST, CERTIFIED REGISTERED

## 2020-08-11 PROCEDURE — 27210794 ZZH OR GENERAL SUPPLY STERILE: Performed by: SURGERY

## 2020-08-11 PROCEDURE — 47562 LAPAROSCOPIC CHOLECYSTECTOMY: CPT | Mod: AS | Performed by: PHYSICIAN ASSISTANT

## 2020-08-11 PROCEDURE — 25800030 ZZH RX IP 258 OP 636: Performed by: ANESTHESIOLOGY

## 2020-08-11 PROCEDURE — 25000125 ZZHC RX 250: Performed by: UROLOGY

## 2020-08-11 PROCEDURE — 25000128 H RX IP 250 OP 636: Performed by: SURGERY

## 2020-08-11 PROCEDURE — 37000009 ZZH ANESTHESIA TECHNICAL FEE, EACH ADDTL 15 MIN: Performed by: SURGERY

## 2020-08-11 PROCEDURE — 36000058 ZZH SURGERY LEVEL 3 EA 15 ADDTL MIN: Performed by: SURGERY

## 2020-08-11 PROCEDURE — 71000013 ZZH RECOVERY PHASE 1 LEVEL 1 EA ADDTL HR: Performed by: SURGERY

## 2020-08-11 PROCEDURE — 47562 LAPAROSCOPIC CHOLECYSTECTOMY: CPT | Performed by: SURGERY

## 2020-08-11 PROCEDURE — 71000012 ZZH RECOVERY PHASE 1 LEVEL 1 FIRST HR: Performed by: SURGERY

## 2020-08-11 PROCEDURE — 52281 CYSTOSCOPY AND TREATMENT: CPT | Performed by: UROLOGY

## 2020-08-11 PROCEDURE — 37000008 ZZH ANESTHESIA TECHNICAL FEE, 1ST 30 MIN: Performed by: SURGERY

## 2020-08-11 PROCEDURE — 88304 TISSUE EXAM BY PATHOLOGIST: CPT | Mod: 26 | Performed by: SURGERY

## 2020-08-11 PROCEDURE — 25000132 ZZH RX MED GY IP 250 OP 250 PS 637: Mod: GY | Performed by: SURGERY

## 2020-08-11 PROCEDURE — 71000027 ZZH RECOVERY PHASE 2 EACH 15 MINS: Performed by: SURGERY

## 2020-08-11 PROCEDURE — 88304 TISSUE EXAM BY PATHOLOGIST: CPT | Performed by: SURGERY

## 2020-08-11 PROCEDURE — 25000125 ZZHC RX 250: Performed by: SURGERY

## 2020-08-11 DEVICE — IMPLANTABLE DEVICE: Type: IMPLANTABLE DEVICE | Site: ABDOMEN | Status: FUNCTIONAL

## 2020-08-11 RX ORDER — DEXAMETHASONE SODIUM PHOSPHATE 4 MG/ML
4 INJECTION, SOLUTION INTRA-ARTICULAR; INTRALESIONAL; INTRAMUSCULAR; INTRAVENOUS; SOFT TISSUE EVERY 10 MIN PRN
Status: DISCONTINUED | OUTPATIENT
Start: 2020-08-11 | End: 2020-08-11 | Stop reason: HOSPADM

## 2020-08-11 RX ORDER — DEXAMETHASONE SODIUM PHOSPHATE 4 MG/ML
INJECTION, SOLUTION INTRA-ARTICULAR; INTRALESIONAL; INTRAMUSCULAR; INTRAVENOUS; SOFT TISSUE PRN
Status: DISCONTINUED | OUTPATIENT
Start: 2020-08-11 | End: 2020-08-11

## 2020-08-11 RX ORDER — LIDOCAINE HYDROCHLORIDE 10 MG/ML
INJECTION, SOLUTION INFILTRATION; PERINEURAL PRN
Status: DISCONTINUED | OUTPATIENT
Start: 2020-08-11 | End: 2020-08-11

## 2020-08-11 RX ORDER — METOCLOPRAMIDE 10 MG/1
10 TABLET ORAL EVERY 6 HOURS PRN
Status: DISCONTINUED | OUTPATIENT
Start: 2020-08-11 | End: 2020-08-11 | Stop reason: HOSPADM

## 2020-08-11 RX ORDER — PROPOFOL 10 MG/ML
INJECTION, EMULSION INTRAVENOUS PRN
Status: DISCONTINUED | OUTPATIENT
Start: 2020-08-11 | End: 2020-08-11

## 2020-08-11 RX ORDER — HYDROMORPHONE HYDROCHLORIDE 1 MG/ML
.3-.5 INJECTION, SOLUTION INTRAMUSCULAR; INTRAVENOUS; SUBCUTANEOUS EVERY 10 MIN PRN
Status: DISCONTINUED | OUTPATIENT
Start: 2020-08-11 | End: 2020-08-11 | Stop reason: HOSPADM

## 2020-08-11 RX ORDER — FENTANYL CITRATE 50 UG/ML
INJECTION, SOLUTION INTRAMUSCULAR; INTRAVENOUS PRN
Status: DISCONTINUED | OUTPATIENT
Start: 2020-08-11 | End: 2020-08-11

## 2020-08-11 RX ORDER — HYDRALAZINE HYDROCHLORIDE 20 MG/ML
2.5-5 INJECTION INTRAMUSCULAR; INTRAVENOUS EVERY 10 MIN PRN
Status: DISCONTINUED | OUTPATIENT
Start: 2020-08-11 | End: 2020-08-11 | Stop reason: HOSPADM

## 2020-08-11 RX ORDER — SODIUM CHLORIDE, SODIUM LACTATE, POTASSIUM CHLORIDE, CALCIUM CHLORIDE 600; 310; 30; 20 MG/100ML; MG/100ML; MG/100ML; MG/100ML
INJECTION, SOLUTION INTRAVENOUS CONTINUOUS
Status: DISCONTINUED | OUTPATIENT
Start: 2020-08-11 | End: 2020-08-11 | Stop reason: HOSPADM

## 2020-08-11 RX ORDER — EPHEDRINE SULFATE 50 MG/ML
INJECTION, SOLUTION INTRAMUSCULAR; INTRAVENOUS; SUBCUTANEOUS PRN
Status: DISCONTINUED | OUTPATIENT
Start: 2020-08-11 | End: 2020-08-11

## 2020-08-11 RX ORDER — LIDOCAINE 40 MG/G
CREAM TOPICAL
Status: DISCONTINUED | OUTPATIENT
Start: 2020-08-11 | End: 2020-08-11 | Stop reason: HOSPADM

## 2020-08-11 RX ORDER — CEFAZOLIN SODIUM 1 G/3ML
1 INJECTION, POWDER, FOR SOLUTION INTRAMUSCULAR; INTRAVENOUS SEE ADMIN INSTRUCTIONS
Status: DISCONTINUED | OUTPATIENT
Start: 2020-08-11 | End: 2020-08-11 | Stop reason: HOSPADM

## 2020-08-11 RX ORDER — ONDANSETRON 2 MG/ML
4 INJECTION INTRAMUSCULAR; INTRAVENOUS EVERY 30 MIN PRN
Status: DISCONTINUED | OUTPATIENT
Start: 2020-08-11 | End: 2020-08-11 | Stop reason: HOSPADM

## 2020-08-11 RX ORDER — METOCLOPRAMIDE HYDROCHLORIDE 5 MG/ML
10 INJECTION INTRAMUSCULAR; INTRAVENOUS EVERY 6 HOURS PRN
Status: DISCONTINUED | OUTPATIENT
Start: 2020-08-11 | End: 2020-08-11 | Stop reason: HOSPADM

## 2020-08-11 RX ORDER — GLYCOPYRROLATE 0.2 MG/ML
INJECTION, SOLUTION INTRAMUSCULAR; INTRAVENOUS PRN
Status: DISCONTINUED | OUTPATIENT
Start: 2020-08-11 | End: 2020-08-11

## 2020-08-11 RX ORDER — ONDANSETRON 2 MG/ML
INJECTION INTRAMUSCULAR; INTRAVENOUS PRN
Status: DISCONTINUED | OUTPATIENT
Start: 2020-08-11 | End: 2020-08-11

## 2020-08-11 RX ORDER — MEPERIDINE HYDROCHLORIDE 25 MG/ML
12.5 INJECTION INTRAMUSCULAR; INTRAVENOUS; SUBCUTANEOUS
Status: DISCONTINUED | OUTPATIENT
Start: 2020-08-11 | End: 2020-08-11 | Stop reason: HOSPADM

## 2020-08-11 RX ORDER — FENTANYL CITRATE 50 UG/ML
25-50 INJECTION, SOLUTION INTRAMUSCULAR; INTRAVENOUS
Status: DISCONTINUED | OUTPATIENT
Start: 2020-08-11 | End: 2020-08-11 | Stop reason: HOSPADM

## 2020-08-11 RX ORDER — HYDROCODONE BITARTRATE AND ACETAMINOPHEN 5; 325 MG/1; MG/1
1-2 TABLET ORAL EVERY 4 HOURS PRN
Qty: 20 TABLET | Refills: 0 | Status: SHIPPED | OUTPATIENT
Start: 2020-08-11 | End: 2020-11-04

## 2020-08-11 RX ORDER — CEFAZOLIN SODIUM IN 0.9 % NACL 3 G/100 ML
3 INTRAVENOUS SOLUTION, PIGGYBACK (ML) INTRAVENOUS
Status: COMPLETED | OUTPATIENT
Start: 2020-08-11 | End: 2020-08-11

## 2020-08-11 RX ORDER — NALOXONE HYDROCHLORIDE 0.4 MG/ML
.1-.4 INJECTION, SOLUTION INTRAMUSCULAR; INTRAVENOUS; SUBCUTANEOUS
Status: DISCONTINUED | OUTPATIENT
Start: 2020-08-11 | End: 2020-08-11 | Stop reason: HOSPADM

## 2020-08-11 RX ORDER — ONDANSETRON 4 MG/1
4 TABLET, ORALLY DISINTEGRATING ORAL EVERY 30 MIN PRN
Status: DISCONTINUED | OUTPATIENT
Start: 2020-08-11 | End: 2020-08-11 | Stop reason: HOSPADM

## 2020-08-11 RX ORDER — BUPIVACAINE HYDROCHLORIDE AND EPINEPHRINE 5; 5 MG/ML; UG/ML
INJECTION, SOLUTION PERINEURAL PRN
Status: DISCONTINUED | OUTPATIENT
Start: 2020-08-11 | End: 2020-08-11 | Stop reason: HOSPADM

## 2020-08-11 RX ORDER — NEOSTIGMINE METHYLSULFATE 1 MG/ML
VIAL (ML) INJECTION PRN
Status: DISCONTINUED | OUTPATIENT
Start: 2020-08-11 | End: 2020-08-11

## 2020-08-11 RX ORDER — HYDROCODONE BITARTRATE AND ACETAMINOPHEN 5; 325 MG/1; MG/1
1 TABLET ORAL
Status: COMPLETED | OUTPATIENT
Start: 2020-08-11 | End: 2020-08-11

## 2020-08-11 RX ORDER — DIMENHYDRINATE 50 MG/ML
25 INJECTION, SOLUTION INTRAMUSCULAR; INTRAVENOUS
Status: DISCONTINUED | OUTPATIENT
Start: 2020-08-11 | End: 2020-08-11 | Stop reason: HOSPADM

## 2020-08-11 RX ADMIN — Medication 5 MG: at 13:23

## 2020-08-11 RX ADMIN — HYDROCODONE BITARTRATE AND ACETAMINOPHEN 1 TABLET: 5; 325 TABLET ORAL at 14:15

## 2020-08-11 RX ADMIN — PROPOFOL 200 MG: 10 INJECTION, EMULSION INTRAVENOUS at 12:19

## 2020-08-11 RX ADMIN — SODIUM CHLORIDE 1500 ML: 900 IRRIGANT IRRIGATION at 13:24

## 2020-08-11 RX ADMIN — FENTANYL CITRATE 50 MCG: 50 INJECTION, SOLUTION INTRAMUSCULAR; INTRAVENOUS at 14:38

## 2020-08-11 RX ADMIN — LIDOCAINE HYDROCHLORIDE 50 MG: 10 INJECTION, SOLUTION INFILTRATION; PERINEURAL at 12:19

## 2020-08-11 RX ADMIN — FENTANYL CITRATE 50 MCG: 50 INJECTION, SOLUTION INTRAMUSCULAR; INTRAVENOUS at 14:29

## 2020-08-11 RX ADMIN — ROCURONIUM BROMIDE 10 MG: 10 INJECTION INTRAVENOUS at 12:55

## 2020-08-11 RX ADMIN — PHENYLEPHRINE HYDROCHLORIDE 100 MCG: 10 INJECTION INTRAVENOUS at 12:49

## 2020-08-11 RX ADMIN — Medication 5 MG: at 12:47

## 2020-08-11 RX ADMIN — FENTANYL CITRATE 100 MCG: 50 INJECTION, SOLUTION INTRAMUSCULAR; INTRAVENOUS at 12:19

## 2020-08-11 RX ADMIN — PHENYLEPHRINE HYDROCHLORIDE 200 MCG: 10 INJECTION INTRAVENOUS at 13:02

## 2020-08-11 RX ADMIN — SODIUM CHLORIDE, POTASSIUM CHLORIDE, SODIUM LACTATE AND CALCIUM CHLORIDE: 600; 310; 30; 20 INJECTION, SOLUTION INTRAVENOUS at 12:56

## 2020-08-11 RX ADMIN — PHENYLEPHRINE HYDROCHLORIDE 100 MCG: 10 INJECTION INTRAVENOUS at 12:59

## 2020-08-11 RX ADMIN — Medication 3 G: at 12:18

## 2020-08-11 RX ADMIN — WATER 600 ML: 100 IRRIGANT IRRIGATION at 12:40

## 2020-08-11 RX ADMIN — ROCURONIUM BROMIDE 15 MG: 10 INJECTION INTRAVENOUS at 12:39

## 2020-08-11 RX ADMIN — SODIUM CHLORIDE, POTASSIUM CHLORIDE, SODIUM LACTATE AND CALCIUM CHLORIDE: 600; 310; 30; 20 INJECTION, SOLUTION INTRAVENOUS at 12:11

## 2020-08-11 RX ADMIN — GLYCOPYRROLATE 0.2 MG: 0.2 INJECTION, SOLUTION INTRAMUSCULAR; INTRAVENOUS at 12:19

## 2020-08-11 RX ADMIN — HYDROMORPHONE HYDROCHLORIDE 0.5 MG: 1 INJECTION, SOLUTION INTRAMUSCULAR; INTRAVENOUS; SUBCUTANEOUS at 12:31

## 2020-08-11 RX ADMIN — BUPIVACAINE HYDROCHLORIDE AND EPINEPHRINE BITARTRATE 20 ML: 5; .005 INJECTION, SOLUTION EPIDURAL; INTRACAUDAL; PERINEURAL at 13:23

## 2020-08-11 RX ADMIN — ONDANSETRON HYDROCHLORIDE 4 MG: 2 INJECTION, SOLUTION INTRAVENOUS at 12:31

## 2020-08-11 RX ADMIN — ROCURONIUM BROMIDE 50 MG: 10 INJECTION INTRAVENOUS at 12:19

## 2020-08-11 RX ADMIN — GLYCOPYRROLATE 0.6 MG: 0.2 INJECTION, SOLUTION INTRAMUSCULAR; INTRAVENOUS at 13:23

## 2020-08-11 RX ADMIN — DEXAMETHASONE SODIUM PHOSPHATE 4 MG: 4 INJECTION, SOLUTION INTRA-ARTICULAR; INTRALESIONAL; INTRAMUSCULAR; INTRAVENOUS; SOFT TISSUE at 12:19

## 2020-08-11 ASSESSMENT — MIFFLIN-ST. JEOR: SCORE: 2332.45

## 2020-08-11 NOTE — DISCHARGE INSTRUCTIONS
GENERAL ANESTHESIA OR SEDATION ADULT DISCHARGE INSTRUCTIONS   SPECIAL PRECAUTIONS FOR 24 HOURS AFTER SURGERY    IT IS NOT UNUSUAL TO FEEL LIGHT-HEADED OR FAINT, UP TO 24 HOURS AFTER SURGERY OR WHILE TAKING PAIN MEDICATION.  IF YOU HAVE THESE SYMPTOMS; SIT FOR A FEW MINUTES BEFORE STANDING AND HAVE SOMEONE ASSIST YOU WHEN YOU GET UP TO WALK OR USE THE BATHROOM.    YOU SHOULD REST AND RELAX FOR THE NEXT 24 HOURS AND YOU MUST MAKE ARRANGEMENTS TO HAVE SOMEONE STAY WITH YOU FOR AT LEAST 24 HOURS AFTER YOUR DISCHARGE.  AVOID HAZARDOUS AND STRENUOUS ACTIVITIES.  DO NOT MAKE IMPORTANT DECISIONS FOR 24 HOURS.    DO NOT DRIVE ANY VEHICLE OR OPERATE MECHANICAL EQUIPMENT FOR 24 HOURS FOLLOWING THE END OF YOUR SURGERY.  EVEN THOUGH YOU MAY FEEL NORMAL, YOUR REACTIONS MAY BE AFFECTED BY THE MEDICATION YOU HAVE RECEIVED.    DO NOT DRINK ALCOHOLIC BEVERAGES FOR 24 HOURS FOLLOWING YOUR SURGERY.    DRINK CLEAR LIQUIDS (APPLE JUICE, GINGER ALE, 7-UP, BROTH, ETC.).  PROGRESS TO YOUR REGULAR DIET AS YOU FEEL ABLE.    YOU MAY HAVE A DRY MOUTH, A SORE THROAT, MUSCLES ACHES OR TROUBLE SLEEPING.  THESE SHOULD GO AWAY AFTER 24 HOURS.    CALL YOUR DOCTOR FOR ANY OF THE FOLLOWING:  SIGNS OF INFECTION (FEVER, GROWING TENDERNESS AT THE SURGERY SITE, A LARGE AMOUNT OF DRAINAGE OR BLEEDING, SEVERE PAIN, FOUL-SMELLING DRAINAGE, REDNESS OR SWELLING.    IT HAS BEEN OVER 8 TO 10 HOURS SINCE SURGERY AND YOU ARE STILL NOT ABLE TO URINATE (PASS WATER).     HOME CARE FOLLOWING LAPAROSCOPIC CHOLECYSTECTOMY  LUCY Alford, SERENITY Zaragoza. PAUL Blue &  NEHA Lang      IINCISIONAL CARE:  Replace the bandage over your incisions until all drainage stops, or if more comfortable to have in place.  If present, leave the steri-strips (white paper tapes) in place for 14 days after surgery.  If Dermabond (a type of skin glue) is present, leave in place until it wears/flakes off.   BATHING:  Avoid baths for 1 week after surgery.  Showers are  okay.  You may wash your hair at any time.  Gently pat your incisions dry after bathing.  ACTIVITY:  Light Activity -- you may immediately be up and about as tolerated.  Driving -- you may drive when comfortable and off narcotic pain medications.  Light Work -- resume when comfortable off pain medications.  (If you can drive, you probably can work.)  Strenuous Work/Activity -- limit lifting to 20 pounds for 2 weeks.  Progressively increase with time.  Active Sports (running, biking, etc.) -- cautiously resume after 2 weeks.  DISCOMFORT:  Use pain medications as prescribed by your surgeon.  Take the pain medication with some food, when possible, to minimize side effects.  Intermittent use of ice packs at the incision sites may help during the first 48 hours.  Expect gradual improvement.  You may experience shoulder pain, which is due to the air placed within your abdomen during the procedure.  This is temporary and usually passes within 2 days.  DIET:  Drink plenty of fluids.  While taking pain medications, increase dietary fiber or add a fiber supplementation like Metamucil or Citrucel to help prevent constipation - a possible side effect of pain medications.  If taking Metamucil or Citrucel, take with plenty of fluids as instructed.  It is not uncommon to experience some bowel changes (loose stools or constipation) after surgery.  Your body has to adapt to you no longer having a gall bladder.  To help minimize this side effect, avoid fatty foods for the first week after surgery.  You may then slowly increase the amount of fatty foods in your diet.    NAUSEA:  If nauseated from the anesthetic/pain meds; rest in bed, get up cautiously with assistance, and drink clear liquids (juice, tea, broth).  FOLLOW-UP AFTER SURGERY:  Our office will contact you approximately 2-3 weeks after surgery to check on your progress and answer any questions you may have.  If you are doing well, you will not need to return for an office  appointment.  If any concerns are identified over the phone, we will help you make an appointment to see a provider.  If you have not received a phone call, have any questions or concerns, or would like to be seen, please call us at [phone].  We are located at [address], [facility] [address] [address].    CONTACT US IF THE FOLLOWING DEVELOPS:  1.  A fever that is above 101   2.  If there is a large amount of drainage, bleeding, or swelling.  3.  Severe pain that is not relieved by your prescription.  4.  Drainage that is thick, cloudy, yellow, green or white.                                                             5.  Any other questions not answered by  Frequently Asked Questions  sheet.   CYSTOSCOPY DISCHARGE INSTRUCTIONS  [facility]  [facility] [facility]  [phone]    YOU MAY GO BACK TO YOUR NORMAL DIET AND ACTIVITY, UNLESS YOUR DOCTOR TELLS YOU NOT TO.    FOR THE NEXT TWO DAYS, YOU MAY NOTICE:    SOME BLOOD IN YOUR URINE.  SOME BURNING WHEN YOU URINATE.  AN URGE TO URINATE MORE OFTEN.  BLADDER SPASMS.    THESE ARE NORMAL AFTER THE PROCEDURE.  THEY SHOULD GO AWAY AFTER A DAY OR TWO.  TO RELIEVE THESE PROBLEMS:     DRINK 6 TO 8 LARGE GLASSES OF WATER EACH DAY (INCLUDES DRINKS AT MEALS).  THIS WILL HELP CLEAR THE URINE.    TAKE WARM BATHS TO RELIEVE PAIN AND BLADDER SPASMS.  DO NOT ADD ANYTHING TO THE BATH WATER.    YOUR DOCTOR MAY PRESCRIBE PAIN MEDICINE.  YOU MAY ALSO TAKE TYLENOL (ACETAMINOPHEN) FOR PAIN.    CALL YOUR SURGEON IF YOU HAVE:    A FEVER OVER 101 DEGREES.  CHECK YOUR TEMPERATURE UNDER YOUR TONGUE.    CHILLS.    FAILURE TO URINATE (NO URINE COMES OUT WHEN YOU TRY TO USE THE TOILET).  TRY SOAKING IN A BATHTUB FULL OF WARM WATER.  IF STILL NO URINE, CALL YOUR DOCTOR.    A LOT OF BLOOD IN THE URINE, OR BLOOD CLOTS LARGER THAN A NICKEL.      PAIN IN THE BACK OR BELLY AREA (ABDOMEN).    PAIN OR SPASMS THAT ARE NOT RELIEVED BY WARM TUB BATHS AND PAIN MEDICINE.       SEVERE PAIN, BURNING OR OTHER PROBLEMS WHILE PASSING URINE.    PAIN THAT GETS WORSE AFTER TWO DAYS.        OLGUIN CATHETER CARE    What is a  Olguin Catheter?  A Olguin catheter is a tube that drains urine from the bladder.  Your doctor or nurse will tell you why you have it and how long you may need it.    The Olguin is held in your bladder by a small balloon filled with water.  When it is time to remove it, we will remove the water from the balloon.  Or we may teach you how to remove the Olguin at home.    Your clinic or nurse may change the tube regularly.    The Olguin connects to tubing attached to a bag-either a large drainage bag or a smaller leg bag.  Urine flows through the Olguin into this bag.  You will empty the bag regularly.    We will teach you how to care for the tube at home.  If you have any questions, be sure to ask.    BASIC CARE  Prevent Infections    If germs enter the body, they may cause an infection.  Germs can get in:  -  Where the tube enters your body.  -  Where the tube connects to the bag.  -  Through the drainage spout on the bag.    To reduce the risk of infection, follow all the steps in the booklet carefully.    Always begin by washing your hands for 15 seconds.  Don't touch the spout of the bag with your fingers.  Be sure the spout doesn't touch the toilet.    Clean the skin around the tube each day    Don't take a tub bath while you have the tube.  You may take a shower.    Clean the area around the tube each day.  Use soap, water and a wash cloth.    -  Gently remove any crust or sticky material from the tube.  Do not move it too much, as this can cause pain, discomfort and infection.    -  Females:  Wash the genital area front to back.  After using the toilet (having a bowel movement), wipe backward and away from the tube.    -  Males (not circumcised):  Pull back the foreskin to clean around the tube.  Then, return the foreskin to its normal position.  If crust builds up around  it during the day, you may need to repeat this process in the evening.    -  Rinse well to remove all the soap.    Do not use creams, powders or ointments on the skin around the tube.    Secure the tube to your upper thigh (to keep it from pulling out)    Tape the tube to your upper thigh, leaving a little bit of slack.  This will help prevent the tube from getting pulled out by accident.    Your nurse can show you how to secure the tube.  Follow these steps:    1.  Place a long piece of tape on your thigh.  You may want to shave any hair from the area first.    2.  Wrap another long piece of tape around the tube.  Tape this to the first piece of tape.  There should be enough slack in the tube to let you stand and walk comfortably.    3.  Change the tape every two to three days, switching thighs each time.    You may ask your nurse or pharmacy about other products to help secure the tube to your leg (such as plastic or cloth Velcro straps).    Keep your bag and its tubing lower than your bladder    Keep your bag and its tubing below the bladder at all times.  This will prevent urine from flowing back into the bladder.    To drain well, the bag needs to be the lowest part of the system.  Any loops of tubing must be higher than the bag.    -  When using a leg bag, take the bag off your leg if you lie down.  Hang it over the edge of the bed or couch.  You may want to use a safety pin to secure the bag.  Be careful not to poke the bag or tubing with the pin.    -  At night, coil extra loops of the tubing on the bed.  Use a clip to hold the loops.  Hang the bag on the side of the bed (or place it on the floor in a bowl, bucket or clean trash can).    Keep your tube from getting blocked  Tips:  -  Empty the urine from your bag regularly.  You may want to empty it when the bag becomes half-full.    -  Check the tubing often for kinks that may block the flow of urine.    -  Drink plenty of liquids-at least eight extra  glasses of water each day (unless your doctor tells you not to).    -  If you will have the tube for a long time, it should be changed about every three to six weeks.  Your doctor will tell you how often to have your tube changed.  You may need to come back to the clinic, or your home nurse will change your tube.    -  Your doctor or nurse may teach you how to flush the tube, if it often gets plugged.      CHOOSING YOUR BAG    If you will only use the large bag    Some people prefer to use the large drainage bag night and day.  This requires less time and effort-and fewer supplies.  It is safer, too:  You don't have to change the bag as often, and this lowers the risk of infection.    -  Change the bag once a month or when it leaks.    -  You may keep it in a shopping bag with handles during the day.  This allows you to move around easily, and it keeps the bag and tubing lower than your bladder.    If you will use a leg bag    Other people prefer to use a leg bag during the day and switch back to the larger bag at night.  A leg bag gives you more freedom to move around.  You can keep the bag hidden under loose-fitting slacks or a long skirt.    Because it is a smaller bag, you will need to empty it more often.  Also, each time you change bags, germs can get into your body.    To set up a new leg bag, follow the steps below.  These steps are for the Conveen leg bag.  If you use another brand, follow the directions on the package insert.    1.  Wash your hands for 15 seconds.  Use soap and water.  Clean your work area with alcohol (or soap and water) and a paper towel.    2.  Place these items on your clean work area:  -  Leg bag kit (opened)  -  Clean scissors  -  Alcohol pads    3.  Button the leg straps through the top and bottom of the bag.  Place the wide strap at the top of the bag.  You may trim the straps to fit your lower leg.  The bag will hang below your knee.    4.  Close the drainage spout at the bottom  of the bag.    5.  Decide how long the bag's tubing needs to be.  It should reach below the knee.  Allow a little slack in the tubing, so you can walk and sit freely.  If you decide to shorten the tubing:    -  Use an alcohol pad to clean your scissors well.    -  Use another alcohol pad to wipe the tubing.    -  Cut the tubing to the right size.  Do not allow the tip of the tubing to touch anything.    6.  Insert the white spout into the bag's tubing.    -  Keep the gray cover on the large end.    -  Push the small end into the tubing.  Once it's in, it can't be removed.    Changing bags    1.  Wash your hands for 15 seconds.  You may then put on clean gloves, if you wish.    2.  Place a towel under the tubes to catch any drops of urine.    3.  Use an alcohol pad to clean where the current bag connects with the tube.  Wipe three times in a row.    4.  Gently twist the tubes apart.  Don't use your nails.  (Nails often carry germs).  Squeeze the tube gently to keep urine from dripping out.    5.  Connect the tubing from the new bag to the bladder tube.  Do not touch the ends.  Check that the tubes connect tightly.    Cleaning your bag    Before you can reuse a bag, you must wash it with soap and rinse it with cleaning solution.  Clean the bag as soon as you disconnect it from the tube.  Do not re-use a bag without cleaning it first.  You may use the bag for one week.  After that, throw it away.    If you are not using a leg bag, you must change the bag at least once a month.  You don't need to clean it-just throw it away.    To clean a bag, follow these steps:    1.  Clean your work area with alcohol (or soap and water) and a paper towel.  Wash your hands with soap and water.    2.  Place these items on your clean work area:    -  Clean funnel    -  Liquid dish soap    -  Cleaning solution (choose one):      1/4 cup white vinegar + 3/4 cup water, or      15mL (milliliters) bleach + 150mL water      (If using bleach,  mix a new batch each day).    -  Clean paper towel    -  Clean towel or storage container    3.  Empty the urine from the bag into the toilet.    4.  Fill the bag with cool tap water.  A small funnel will help direct the stream of water.    5.  Drain and fill the bag again, adding a couple of drops of dish soap.  Gently squeeze the bag several times to clean the inside.  Drain the water into the toilet.  Rinse the bag well with tap water.    6.  Fill the bag with your cleaning solution.  Gently squeeze the bag several times.    -  For vinegar and water:  Let it sit for 30 minutes.    -  For bleach and water:  Let it sit for 30 seconds.    7.  Empty the bag into the toilet.    8.  Hang to air-dry with both ends pointing down.  Tips:  -  Pull the sides of the bag apart to speed drying.    -  Do not hang the bag or tubing over a radiator or other source of heat.  This may lead to germs and infection.    -  You may wish to use a  to hang the bag.    -  You may cover the end of the tubing with clean paper towel.  Use a rubber band to hold the paper towel in place.    9.  After the bag and tubing have dried, store them in a clean towel or covered container.  If you used a paper towel, you may remove it.    -  Before you re-use the bag, clean the end of the tubing with an alcohol pad.    When to call for help    Call your home care nurse or doctor's office right away if:    -  The tube comes out.  (In this case, it must be replaced).    -  No urine drains through the tube, or there's less urine than normal.    -  Your urine looks bloody or cloudy, it has changed color, or you see large blood clots.    -  Your urine has a bad odor.    -  You have pain in your back or lower belly area (abdomen).    -  You have a fever over 101 degrees F (38.3 degrees C), taken under the tongue.    -  Urine leaks around the tube for more than a day or two.    -  The skin around your tube is swollen, red, very tender or draining  pus.    -  You have swollen testicles.    Your tube may cause some pain or discomfort at first.  Your pain should keep getting better with time.  If it gets worse, doesn't improve or cannot be controlled with pain medicine, see your doctor.        Dr Pihpps office will call you to set up a time for cathter removal in office

## 2020-08-11 NOTE — ANESTHESIA POSTPROCEDURE EVALUATION
Patient: Jovi Aldana    Procedure(s):  (2) LAPAROSCOPIC CHOLECYSTECTOMY  (1) CYSTOSCOPY, exam under anesthesia, urethral dialation    Diagnosis:Cholecystitis [K81.9]  Penile pain [N48.89]  Diagnosis Additional Information: No value filed.    Anesthesia Type:  General    Note:  Anesthesia Post Evaluation    Patient location during evaluation: PACU  Patient participation: Able to fully participate in evaluation  Level of consciousness: awake and alert  Pain management: adequate  Airway patency: patent  Cardiovascular status: acceptable  Respiratory status: acceptable  Hydration status: acceptable  PONV: controlled     Anesthetic complications: None          Last vitals:  Vitals:    08/11/20 1534 08/11/20 1542 08/11/20 1600   BP: (!) 161/74     Pulse: 77     Resp: 16  18   Temp: 98.4  F (36.9  C)     SpO2: 93% (!) 89% 91%         Electronically Signed By: Gabe Gardner MD  August 11, 2020  4:55 PM

## 2020-08-11 NOTE — ANESTHESIA CARE TRANSFER NOTE
Patient: Jovi Aldana    Procedure(s):  (2) LAPAROSCOPIC CHOLECYSTECTOMY  (1) CYSTOSCOPY, exam under anesthesia, urethral dialation    Diagnosis: Cholecystitis [K81.9]  Penile pain [N48.89]  Diagnosis Additional Information: No value filed.    Anesthesia Type:   General     Note:  Airway :Face Mask  Patient transferred to:PACU  Comments: To PACU, report to RN, oxygen per face mask.      Vitals: (Last set prior to Anesthesia Care Transfer)    CRNA VITALS  8/11/2020 1307 - 8/11/2020 1346      8/11/2020             EKG:  NSR                Electronically Signed By: KRISTAN Obrien CRNA  August 11, 2020  1:46 PM

## 2020-08-11 NOTE — ANESTHESIA PREPROCEDURE EVALUATION
"Anesthesia Pre-Procedure Evaluation    Patient: Jovi Aldana   MRN: [id number] : 1945          Preoperative Diagnosis: Cholecystitis [K81.9]  Penile pain [N48.89]    Procedure(s):  (2) LAPAROSCOPIC CHOLECYSTECTOMY  (1) CYSTOSCOPY    Past Medical History:   Diagnosis Date     Adrenal mass (H)      Arthritis      Complication of anesthesia     \"hard time waking up\" after vein stripping     Coronary artery disease      Diabetes mellitus (H)      Gout      Heartburn      Hyperlipidemia      Hypertension      Mumps      PONV (postoperative nausea and vomiting)      Renal disease     stone     Sleep apnea     CPAP     Venous insufficiency      Past Surgical History:   Procedure Laterality Date     COLONOSCOPY       COLONOSCOPY  5/15/2012    Procedure:COLONOSCOPY; COLONOSCOPY; Surgeon:ALICIA HART; Location:[facility]     ESOPHAGOSCOPY, GASTROSCOPY, DUODENOSCOPY (EGD), COMBINED N/A 2018    Procedure: COMBINED ESOPHAGOSCOPY, GASTROSCOPY, DUODENOSCOPY (EGD);  ESOPHAGOSCOPY, GASTROSCOPY, DUODENOSCOPY with biopsies, and esophogeal dilation. ;  Surgeon: Mic Gilman MD;  Location: [facility]     GENITOURINARY SURGERY      lithrotripsy     ORTHOPEDIC SURGERY      laminectomy L 4-5     ORTHOPEDIC SURGERY      disectomy      VASCULAR SURGERY      vein stripping     Anesthesia Evaluation     .             ROS/MED HX    ENT/Pulmonary:     (+)sleep apnea, , . .    Neurologic:     (+)CVA TIA     Cardiovascular:     (+) hypertension--CAD, --. : . . . :. .       METS/Exercise Tolerance:     Hematologic:  - neg hematologic  ROS       Musculoskeletal:  - neg musculoskeletal ROS       GI/Hepatic:  - neg GI/hepatic ROS       Renal/Genitourinary:     (+) chronic renal disease,       Endo:     (+) type II DM Obesity, .      Psychiatric:  - neg psychiatric ROS       Infectious Disease:  - neg infectious disease ROS       Malignancy:      - no malignancy   Other:    (+) No chance of pregnancy C-spine cleared: N/A, no H/O " "Chronic Pain,no other significant disability   - neg other ROS                      Physical Exam  Normal systems: cardiovascular, pulmonary and dental    Airway   Mallampati: II  TM distance: >3 FB  Neck ROM: full    Dental     Cardiovascular       Pulmonary             Lab Results   Component Value Date    WBC 6.4 08/04/2020    HGB 13.9 08/04/2020    HCT 41.8 08/04/2020     08/04/2020    .2 08/04/2020    POTASSIUM 4.42 08/04/2020    CHLORIDE 96.7 (A) 08/04/2020    CO2 32.9 (A) 08/04/2020    BUN 9 08/04/2020    BUN 11.0 08/04/2020    CR 0.82 08/04/2020     (A) 08/04/2020    RULA 9.6 08/04/2020    ALBUMIN 4 08/01/2016    PROTTOTAL 7 08/01/2016    ALT 26 08/09/2017    AST 16 08/01/2016    ALKPHOS 66 08/01/2016    BILITOTAL 0.5 08/01/2016    TSH 1.58 03/01/2013       Preop Vitals  BP Readings from Last 3 Encounters:   08/11/20 (!) 169/84   08/04/20 (!) 180/82   07/21/20 (!) 150/72    Pulse Readings from Last 3 Encounters:   08/11/20 87   08/04/20 86   05/12/20 85      Resp Readings from Last 3 Encounters:   08/11/20 20   08/04/20 24   05/02/18 16    SpO2 Readings from Last 3 Encounters:   08/11/20 96%   08/04/20 98%   05/12/20 99%      Temp Readings from Last 1 Encounters:   08/11/20 98.9  F (37.2  C) (Temporal)    Ht Readings from Last 1 Encounters:   08/11/20 1.854 m (6' 1\")      Wt Readings from Last 1 Encounters:   08/11/20 (!) 155.4 kg (342 lb 8 oz)    Estimated body mass index is 45.19 kg/m  as calculated from the following:    Height as of this encounter: 1.854 m (6' 1\").    Weight as of this encounter: 155.4 kg (342 lb 8 oz).       Anesthesia Plan      History & Physical Review  History and physical reviewed and following examination; no interval change.    ASA Status:  3 .    NPO Status:  > 8 hours    Plan for General with Propofol and Intravenous induction. Maintenance will be Balanced.    PONV prophylaxis:  Ondansetron (or other 5HT-3) and Dexamethasone or Solumedrol         Postoperative " Care  Postoperative pain management:  IV analgesics.      Consents  Anesthetic plan, risks, benefits and alternatives discussed with:  Patient.  Use of blood products discussed: Yes.   Consented to blood products.  .                 Gabe Gardner MD                    .

## 2020-08-11 NOTE — OP NOTE
Procedure Date: 08/11/2020      PREOPERATIVE DIAGNOSIS:  Microhematuria.      POSTOPERATIVE DIAGNOSES:  Microhematuria, benign prostatic hypertrophy with large middle lobe, meatal stenosis.      PROCEDURES:  Video cystoscopy, meatal urethral dilation, examination under anesthesia.      SURGEON:  Saurav Mane MD      ANESTHESIA:  General laryngeal mask.      ESTIMATED BLOOD LOSS: 5cc.      FINDINGS:  Could not pass into the bladder with rigid cystoscope, so used flexible scope for cystoscopy.      INDICATIONS:  The patient is a 75-year-old male who I saw in the office last month with penile pain.  He was originally evaluated in 01/2020.  The pain is now at a low level and can be worse with urination.  He is sexually inactive and not having ejaculations.  He denies any gross hematuria, but has 4 red blood cells per high-powered field on most recent urinalysis.  He has had no flank pain or low back pain, prostatitis, or UTIs.  He has a remote history of stones, but a CT scan in 2016 and renal ultrasound in 2018 showed no recurrence.      OTHER PAST MEDICAL HISTORY:  Includes trigeminal neuralgia, TMJ problems, type 2 diabetes, hypertension, arthritis, sleep apnea, hyperlipidemia, venous insufficiency, coronary artery disease, a benign left adrenal mass, SVT, and he is a nonsmoker.  The patient has a family history of prostate cancer and PSAs at [facility] were normal up until 2016.  Since then, he has not had a PSA or SOFIA.  SOFIA on 07/21 with me revealed a benign and symmetric prostate, but I could not reach the seminal vesicles.      The patient will need updated renal imaging and is now here for cystoscopy before laparoscopic cholecystectomy.      DESCRIPTION OF THE PROCEDURE:  The patient received IV antibiotics and was taken to the OR suite.  He was placed supine on the operating table and administered a general laryngeal mask anesthetic.  He was then placed in lithotomy and his genitalia were prepped and draped in a  sterile fashion.  A 22-New Zealander cystoscope could not be admitted through the urethral meatus, so I dilated the meatus with [place] sounds from 20-26 New Ze[place]ander easily.  I then advanced the cystoscope using water as an irrigant, the 30-degree lens, and video.      The urethra appeared normal.  The prostatic fossa revealed lateral lobe enlargement, and the mucosa bled easily from superficial veins.  He had a very enlarged middle lobe, and I could not admit the rigid cystoscope.  I removed that scope and passed an Olympus flexible cystoscope and was able to see an enlarged middle lobe with a high-riding bladder neck.  The bladder mucosa was carefully examined and revealed no raised erythema, tumors, stones.  There was 3+ trabeculation of the bladder wall.  Ureteral orifices were normal.  The flexible cystoscope was removed, and I passed an 18-New Zealander coude catheter in the bladder, placed 5 mL of normal saline in the balloon and placed this to closed gravity drainage.  The patient will be discharged postop with this, and I will have him come to the office Friday morning for catheter removal.         BRITTNEY GARCIA JR, MD             D: 2020   T: 2020   MT: LYNNE      Name:     SAVI MALDONADO   MRN:      [id number]        Account:        [id number]   :      1945           Procedure Date: 2020      Document: [id number]       cc: Floyd Garcia Jr, MD

## 2020-08-11 NOTE — OP NOTE
General Surgery Operative Note    Pre-operative diagnosis:  Cholecystitis [K81.9]  Penile pain [N48.89]   Post-operative diagnosis: same   Procedure: Laparoscopic Cholecystectomy -cystoscopy will be dictated separately by Dr. Saurav Mane   Surgeon: Floyd Smith MD   Assistant(s): Fish Rosas PA-C - the physician assistant was medically necessary to assist in prepping, positioning, camera operation, retraction/exposure and closure of the port site.    Anesthesia: General    Estimated blood loss: 5 cc's   Drains placed: None   Complications:  None   Findings:   Gallbladder without obvious acute inflammation.  A large stone was present.  Moderate bleeding from the gallbladder bed required placement of FloSeal and Surgicel Nu-Knit.  There was excellent resulting hemostasis.     INDICATIONS FOR OPERATION: This is a patient with upper abdominal pain and gallstones.  Laparoscopic cholecystectomy was recommended.  The procedure along with its risks and complications was discussed in detail and the patient agreed to proceed.    DETAILS OF THE OPERATION: After informed consent the patient was taken to the operating room where he underwent satisfactory induction of general anesthesia.  The patient initially underwent cystoscopy by Dr. Saurav Mane.  The procedure is dictated separately.  The patient was then sterilely prepped and draped.  A supraumbilical skin incision was made using a skin knife.  The dissection was carried bluntly down to the fascia.  The fascia was opened using electrocautery and the Hines trocar was then inserted.  Pneumoperitoneum was achieved using CO2 insufflation, and under direct visualization  three  5 mm upper abdominal ports were placed.  The gallbladder was visualized and was grasped. It was pulled up over the liver and the cystic duct was exposed.  The cystic duct was skeletonized, triple clipped and divided.  The cystic artery was likewise triple clipped and divided, along with  any posterior branches.  The gallbladder was then dissected away from the liver using electrocautery.  There was some spillage of bile through the very thin gallbladder wall, but no stone spillage.  There was some bleeding from a superficial vein on the gallbladder bed near the dome of the gallbladder.  This was controlled using electrocautery.  The gallbladder was then placed in an Endo Catch bag and removed through the supraumbilical incision.  The gallbladder fossa was irrigated out.  Due to the bleeding in the gallbladder bed, Surgicel Nu-Knit and FloSeal were placed to maintain hemostasis.  There was excellent hemostasis and the clips were in good position.  The trocar sites were now infiltrated with half percent Marcaine with epinephrine.  The trochars were removed under direct visualization.  The supraumbilical fascia was then closed using 0 Vicryl suture.  Skin incisions were closed using 4-0 subcuticular Vicryl followed by Steri-Strips.    The patient was transferred to the recovery room in satisfactory condition.  Sponge and needle counts were correct at the close of the case.      Specimens:   ID Type Source Tests Collected by Time Destination   A : GALLBLADDER & CONTENTS Tissue Gallbladder and Contents SURGICAL PATHOLOGY EXAM Floyd Smith MD 8/11/2020  1:19 PM            Floyd Smith MD

## 2020-08-12 LAB — COPATH REPORT: NORMAL

## 2020-08-14 ENCOUNTER — ALLIED HEALTH/NURSE VISIT (OUTPATIENT)
Dept: UROLOGY | Facility: CLINIC | Age: 75
End: 2020-08-14
Payer: COMMERCIAL

## 2020-08-14 DIAGNOSIS — R33.9 URINARY RETENTION: Primary | ICD-10-CM

## 2020-08-14 PROCEDURE — 51700 IRRIGATION OF BLADDER: CPT

## 2020-08-14 NOTE — PROGRESS NOTES
Patient presents to clinic for a trial of void per MD order. Patient properly identified and procedure explained to patient. Patient's leg-bag emptied, 200cc's clear-yellow urine drained from patient's catheter.Catheter detached from leg-bag and sterile cysto tubing inserted into catheter opening. Via gravity 300cc's sterile water was gently instilled into bladder. Patient tolerated fairly well and then 8cc's was removed from balloon. 16 Fr. Martin catheter was removed from bladder. Patient was able to successfully voided 425 following removal of catheter. Patient was instructed to drink plenty of water, (At least 6-8 glasses daily). Informed patient that he may see some slight blood in urine for a few days. This should clear and patient doesn't report taking any blood-thinners.  Patient instructed to call office during office hours, otherwise go to ER if unable to urinate/difficulty emptying. Patient verbailized understanding of this and will follow-up with MD as planned.     Jovi Aldana comes into clinic today at the request of Dr. Mane Ordering Provider for Trial of Void.     This service provided today was under the supervising provider of the day Dr. Mane, who was available if needed.    Rima Arias LPN

## 2020-08-18 ENCOUNTER — MYC MEDICAL ADVICE (OUTPATIENT)
Dept: FAMILY MEDICINE | Facility: CLINIC | Age: 75
End: 2020-08-18

## 2020-08-24 ENCOUNTER — DOCUMENTATION ONLY (OUTPATIENT)
Dept: OTHER | Facility: CLINIC | Age: 75
End: 2020-08-24

## 2020-08-24 PROBLEM — Z71.89 ACP (ADVANCE CARE PLANNING): Status: RESOLVED | Noted: 2020-05-05 | Resolved: 2020-08-24

## 2020-08-25 ENCOUNTER — HOSPITAL ENCOUNTER (OUTPATIENT)
Dept: ULTRASOUND IMAGING | Facility: CLINIC | Age: 75
Discharge: HOME | End: 2020-08-25
Attending: UROLOGY | Admitting: UROLOGY
Payer: MEDICARE

## 2020-08-25 ENCOUNTER — OFFICE VISIT (OUTPATIENT)
Dept: UROLOGY | Facility: CLINIC | Age: 75
End: 2020-08-25
Payer: COMMERCIAL

## 2020-08-25 VITALS — BODY MASS INDEX: 41.75 KG/M2 | WEIGHT: 315 LBS | HEIGHT: 73 IN

## 2020-08-25 DIAGNOSIS — N48.89 PENILE PAIN: Primary | ICD-10-CM

## 2020-08-25 DIAGNOSIS — R31.9 HEMATURIA: ICD-10-CM

## 2020-08-25 DIAGNOSIS — R31.29 MICROSCOPIC HEMATURIA: ICD-10-CM

## 2020-08-25 DIAGNOSIS — N40.0 BENIGN PROSTATIC HYPERPLASIA WITHOUT LOWER URINARY TRACT SYMPTOMS: ICD-10-CM

## 2020-08-25 DIAGNOSIS — Z80.42 FAMILY HISTORY OF MALIGNANT NEOPLASM OF PROSTATE IN FATHER: ICD-10-CM

## 2020-08-25 LAB
ALBUMIN UR-MCNC: NEGATIVE MG/DL
APPEARANCE UR: CLEAR
BILIRUB UR QL STRIP: NEGATIVE
COLOR UR AUTO: YELLOW
GLUCOSE UR STRIP-MCNC: 100 MG/DL
HGB UR QL STRIP: ABNORMAL
KETONES UR STRIP-MCNC: NEGATIVE MG/DL
LEUKOCYTE ESTERASE UR QL STRIP: NEGATIVE
NITRATE UR QL: NEGATIVE
PH UR STRIP: 5.5 PH (ref 5–7)
SOURCE: ABNORMAL
SP GR UR STRIP: 1.01 (ref 1–1.03)
UROBILINOGEN UR STRIP-ACNC: 0.2 EU/DL (ref 0.2–1)

## 2020-08-25 PROCEDURE — 88120 CYTP URNE 3-5 PROBES EA SPEC: CPT | Performed by: UROLOGY

## 2020-08-25 PROCEDURE — 99212 OFFICE O/P EST SF 10 MIN: CPT | Performed by: UROLOGY

## 2020-08-25 PROCEDURE — 76770 US EXAM ABDO BACK WALL COMP: CPT

## 2020-08-25 PROCEDURE — 81003 URINALYSIS AUTO W/O SCOPE: CPT | Mod: QW | Performed by: UROLOGY

## 2020-08-25 RX ORDER — FINASTERIDE 5 MG/1
5 TABLET, FILM COATED ORAL DAILY
Qty: 90 TABLET | Refills: 3 | Status: SHIPPED | OUTPATIENT
Start: 2020-08-25 | End: 2021-08-12

## 2020-08-25 ASSESSMENT — MIFFLIN-ST. JEOR: SCORE: 2339.25

## 2020-08-25 ASSESSMENT — PAIN SCALES - GENERAL: PAINLEVEL: MILD PAIN (3)

## 2020-08-25 NOTE — NURSING NOTE
Chief Complaint   Patient presents with     Penile pain     Experiencing sharp pain, the feels like it's going up the penis towards the prostate the comes and goes, not associated with urination.  No burning with urination.    Gianna Sandy, EMT

## 2020-08-25 NOTE — LETTER
8/25/2020       RE: Jovi Aldana  [address]  [place] [address]     Dear Colleague,    Thank you for referring your patient, Jovi Aldana, to the [facility] [place] at [facility]. Please see a copy of my visit note below.    Peterson is a 75-year-old male who is being evaluated for microhematuria.  Cystoscopy a few weeks ago at the time of his cholecystectomy reviewed a high riding bladder neck and a large middle lobe but no bladder tumors or stones.  I could not admit a rigid scope so I used a flexible scope.  He has had normal digital rectal exams.  His renal ultrasound today shows some mild left hydro-so I have ordered a CT scan of the abdomen and pelvis with and without contrast.  His urine will be sent for FISH testing.  Other past medical history, medications and allergies reviewed.  Exam: Alert and oriented, with spouse.  Assessment: Enlarged middle lobe of prostate, microhematuria from middle lobe.  Left hydronephrosis, family history of prostate cancer  Plan: CT scan with and without IV contrast.  See Dr. Cho next year with PSA, for urinalysis, postvoid residual and digital rectal exam.  Start finasteride 5 mg daily-discussed effects and side effects.  If he would require prostate surgery in the future he may need a suprapubic approach    Again, thank you for allowing me to participate in the care of your patient.      Sincerely,    Saurav Mane MD

## 2020-08-25 NOTE — PROGRESS NOTES
Peterson is a 75-year-old male who is being evaluated for microhematuria.  Cystoscopy a few weeks ago at the time of his cholecystectomy reviewed a high riding bladder neck and a large middle lobe but no bladder tumors or stones.  I could not admit a rigid scope so I used a flexible scope.  He has had normal digital rectal exams.  His renal ultrasound today shows some mild left hydro-so I have ordered a CT scan of the abdomen and pelvis with and without contrast.  His urine will be sent for FISH testing.  Other past medical history, medications and allergies reviewed.  Exam: Alert and oriented, with spouse.  Assessment: Enlarged middle lobe of prostate, microhematuria from middle lobe.  Left hydronephrosis, family history of prostate cancer  Plan: CT scan with and without IV contrast.  See Dr. Cho next year with PSA, for urinalysis, postvoid residual and digital rectal exam.  Start finasteride 5 mg daily-discussed effects and side effects.  If he would require prostate surgery in the future he may need a suprapubic approach

## 2020-09-01 ENCOUNTER — TELEPHONE (OUTPATIENT)
Dept: SURGERY | Facility: CLINIC | Age: 75
End: 2020-09-01

## 2020-09-01 NOTE — TELEPHONE ENCOUNTER
Procedure:  Laparoscopic cholecystectomy    Date:  08/11/2020    Surgeon:  Sarah      Patient's wife calling wanting to discuss pathology as well as post op questions.    Pathology:    -cholilithiasis  -chronic cholecystitis, mild  -negative for dysplasia and malignancy    Patient is having bowel movements, mostly diarrhea.      He is trying to maintain low fat diet, but has not been real strict with it.    Primary complaint is ongoing bloating.  Informed them it can take some time for this symptom to resolve. Recommend gas-x or similar to see if this helps with bloating as body adjusts.    They verbalized understanding and will call PRN.    Pathology report pushed to patient's MyChart.    Also informed them that they will still probably be receiving a call from the PA for post op call.    They will call PRN.    Yuliya Kulkarni RN-BSN

## 2020-09-02 LAB — COPATH REPORT: NORMAL

## 2020-09-03 ENCOUNTER — HOSPITAL ENCOUNTER (OUTPATIENT)
Dept: CT IMAGING | Facility: CLINIC | Age: 75
Discharge: HOME | End: 2020-09-03
Attending: UROLOGY | Admitting: UROLOGY
Payer: MEDICARE

## 2020-09-03 DIAGNOSIS — R31.29 MICROSCOPIC HEMATURIA: ICD-10-CM

## 2020-09-03 PROCEDURE — 25000128 H RX IP 250 OP 636: Performed by: UROLOGY

## 2020-09-03 PROCEDURE — 74178 CT ABD&PLV WO CNTR FLWD CNTR: CPT

## 2020-09-03 PROCEDURE — 25000125 ZZHC RX 250: Performed by: UROLOGY

## 2020-09-03 RX ORDER — IOPAMIDOL 755 MG/ML
500 INJECTION, SOLUTION INTRAVASCULAR ONCE
Status: COMPLETED | OUTPATIENT
Start: 2020-09-03 | End: 2020-09-03

## 2020-09-03 RX ADMIN — IOPAMIDOL 100 ML: 755 INJECTION, SOLUTION INTRAVENOUS at 13:11

## 2020-09-03 RX ADMIN — SODIUM CHLORIDE 65 ML: 9 INJECTION, SOLUTION INTRAVENOUS at 13:11

## 2020-09-04 ENCOUNTER — TELEPHONE (OUTPATIENT)
Dept: UROLOGY | Facility: CLINIC | Age: 75
End: 2020-09-04

## 2020-09-30 DIAGNOSIS — B35.4 TINEA CORPORIS: Primary | ICD-10-CM

## 2020-09-30 RX ORDER — KETOCONAZOLE 20 MG/G
CREAM TOPICAL DAILY
Qty: 30 G | Refills: 3 | Status: SHIPPED | OUTPATIENT
Start: 2020-09-30 | End: 2021-06-08

## 2020-09-30 NOTE — TELEPHONE ENCOUNTER
Pt last in office on 8/4/2020 for preop and 5/12/2020    Pharmacy requesting cream    Pending Prescriptions:                       Disp   Refills    ketoconazole (NIZORAL) 2 % external cream 30 g   3            Sig: Apply topically daily

## 2020-10-08 DIAGNOSIS — E11.8 TYPE 2 DIABETES MELLITUS WITH COMPLICATION, WITHOUT LONG-TERM CURRENT USE OF INSULIN (H): Primary | ICD-10-CM

## 2020-10-08 NOTE — TELEPHONE ENCOUNTER
Wife called and is looking for a refill of his gabapentin. States that it was denied and needs OV. He was here not that long ago.    This would be our first time filling, I have attempted to contact this patient by phone with the following results: unable to leave message    Jovi Aldana is requesting a refill of:    Pending Prescriptions:                       Disp   Refills    gabapentin (NEURONTIN) 300 MG capsule     360 ca*1            Sig: Take 1 capsule (300 mg) by mouth 4 times daily

## 2020-10-09 RX ORDER — GABAPENTIN 300 MG/1
300 CAPSULE ORAL 4 TIMES DAILY
Qty: 360 CAPSULE | Refills: 1 | Status: SHIPPED | OUTPATIENT
Start: 2020-10-09 | End: 2021-04-15

## 2020-10-22 DIAGNOSIS — K21.00 GASTROESOPHAGEAL REFLUX DISEASE WITH ESOPHAGITIS WITHOUT HEMORRHAGE: Primary | ICD-10-CM

## 2020-10-22 NOTE — TELEPHONE ENCOUNTER
Pt Last Ov was 5/5/2020 please advise. Thanks     Pending Prescriptions:                       Disp   Refills    omeprazole (PRILOSEC) 40 MG DR capsule    90 cap*3            Sig: Take 1 capsule (40 mg) by mouth 2 times daily           (before meals)

## 2020-10-23 RX ORDER — OMEPRAZOLE 40 MG/1
40 CAPSULE, DELAYED RELEASE ORAL
Qty: 90 CAPSULE | Refills: 3 | Status: SHIPPED | OUTPATIENT
Start: 2020-10-23 | End: 2021-04-19

## 2020-10-27 DIAGNOSIS — G50.0 TRIGEMINAL NEURALGIA: ICD-10-CM

## 2020-10-27 RX ORDER — CARBAMAZEPINE 200 MG/1
200 TABLET ORAL
Qty: 450 TABLET | Refills: 1 | Status: SHIPPED | OUTPATIENT
Start: 2020-10-27 | End: 2020-10-27 | Stop reason: ALTCHOICE

## 2020-10-27 RX ORDER — CARBAMAZEPINE 200 MG/1
200 TABLET, EXTENDED RELEASE ORAL
Qty: 450 TABLET | Refills: 1 | Status: SHIPPED | OUTPATIENT
Start: 2020-10-27 | End: 2021-02-04

## 2020-10-27 NOTE — TELEPHONE ENCOUNTER
Pharmacy called pt takes carbamazepine 200MG extended release. They will not accept my verbal ok for this change. Please send in new script.    Jovi Aldana is requesting a refill of:    Pending Prescriptions:                       Disp   Refills    carBAMazepine (TEGRETOL XR) 200 MG 12 hr *450 ta*1            Sig: Take 1 tablet (200 mg) by mouth 5 times daily

## 2020-10-27 NOTE — TELEPHONE ENCOUNTER
Pt last OV was 5/5/2020. Please advise thanks     Pending Prescriptions:                       Disp   Refills    carBAMazepine (TEGRETOL) 200 MG tablet    450 ta*1            Sig: Take 1 tablet (200 mg) by mouth 5 times daily

## 2020-11-04 ENCOUNTER — OFFICE VISIT (OUTPATIENT)
Dept: FAMILY MEDICINE | Facility: CLINIC | Age: 75
End: 2020-11-04

## 2020-11-04 VITALS
BODY MASS INDEX: 41.75 KG/M2 | WEIGHT: 315 LBS | DIASTOLIC BLOOD PRESSURE: 72 MMHG | HEIGHT: 73 IN | SYSTOLIC BLOOD PRESSURE: 168 MMHG | OXYGEN SATURATION: 95 % | TEMPERATURE: 98.6 F | HEART RATE: 86 BPM

## 2020-11-04 DIAGNOSIS — I10 ESSENTIAL HYPERTENSION: ICD-10-CM

## 2020-11-04 DIAGNOSIS — G56.03 BILATERAL CARPAL TUNNEL SYNDROME: ICD-10-CM

## 2020-11-04 DIAGNOSIS — E11.8 TYPE 2 DIABETES MELLITUS WITH COMPLICATION, WITHOUT LONG-TERM CURRENT USE OF INSULIN (H): ICD-10-CM

## 2020-11-04 DIAGNOSIS — M25.562 CHRONIC PAIN OF LEFT KNEE: Primary | ICD-10-CM

## 2020-11-04 DIAGNOSIS — G89.29 CHRONIC PAIN OF LEFT KNEE: Primary | ICD-10-CM

## 2020-11-04 LAB — HBA1C MFR BLD: 6.9 % (ref 4–7)

## 2020-11-04 PROCEDURE — 73560 X-RAY EXAM OF KNEE 1 OR 2: CPT | Performed by: FAMILY MEDICINE

## 2020-11-04 PROCEDURE — 36415 COLL VENOUS BLD VENIPUNCTURE: CPT | Performed by: FAMILY MEDICINE

## 2020-11-04 PROCEDURE — 99214 OFFICE O/P EST MOD 30 MIN: CPT | Performed by: FAMILY MEDICINE

## 2020-11-04 PROCEDURE — 83036 HEMOGLOBIN GLYCOSYLATED A1C: CPT | Performed by: FAMILY MEDICINE

## 2020-11-04 RX ORDER — ZINC SULFATE 50(220)MG
220 CAPSULE ORAL DAILY
COMMUNITY

## 2020-11-04 ASSESSMENT — MIFFLIN-ST. JEOR: SCORE: 2334.72

## 2020-11-04 NOTE — NURSING NOTE
Peterson is here for diabetes recheck and leg pain with multiple issues    Pre-visit Screening:  Immunizations:  up to date will get HD at pharmacy  Colonoscopy:  is up to date  Mammogram: NA  Asthma Action Test/Plan:  NA  PHQ9:  NA  GAD7:  NA  Questioned patient about current smoking habits Pt. has never smoked.  Ok to leave detailed message on voice mail for today's visit only Yes, phone # [phone]

## 2020-11-04 NOTE — PROGRESS NOTES
Subjective     Jovi Aldana is a 75 year old male who presents to clinic today for the following health issues:    HPI         Diabetes Follow-up    How often are you checking your blood sugar? A few times a week  What time of day are you checking your blood sugars (select all that apply)?  Before meals  Have you had any blood sugars above 200?  No  Have you had any blood sugars below 70?  No    What symptoms do you notice when your blood sugar is low?  Shaky and Dizzy    What concerns do you have today about your diabetes? None     Do you have any of these symptoms? (Select all that apply)  Burning in feet    Have you had a diabetic eye exam in the last 12 months? Yes- Date of last eye exam: 10/20,  Location: [place] [facility]          Hyperlipidemia Follow-Up      Are you regularly taking any medication or supplement to lower your cholesterol?   No    Are you having muscle aches or other side effects that you think could be caused by your cholesterol lowering medication?  Yes- body aches    Hypertension Follow-up      Do you check your blood pressure regularly outside of the clinic? Yes     Are you following a low salt diet? Yes    Are your blood pressures ever more than 140 on the top number (systolic) OR more   than 90 on the bottom number (diastolic), for example 140/90? No    BP Readings from Last 2 Encounters:   11/04/20 (!) 188/80   08/11/20 119/55     Hemoglobin A1C (%)   Date Value   11/04/2020 6.9   08/04/2020 7.2 (A)     LDL Cholesterol Calculated (mg/dL)   Date Value   08/09/2017 178 (H)   01/30/2017 184 (H)         How many servings of fruits and vegetables do you eat daily?  2-3    On average, how many sweetened beverages do you drink each day (Examples: soda, juice, sweet tea, etc.  Do NOT count diet or artificially sweetened beverages)?   1    How many days per week do you exercise enough to make your heart beat faster? 3 or less    How many minutes a day do you exercise enough to make your heart  "beat faster? 10 - 19    How many days per week do you miss taking your medication? 0    Musculoskeletal problem/pain  Onset/Duration: 8 weeks  Description  Location: knee - left  Joint Swelling: YES  Redness: no  Pain: YES  Warmth: no  Intensity:  moderate  Progression of Symptoms:  improving  Accompanying signs and symptoms:   Fevers: no  Numbness/tingling/weakness: no  History  Trauma to the area: YES  Recent illness:  no  Previous similar problem: YES  Previous evaluation:  YES- has arthritis  Precipitating or alleviating factors:  Aggravating factors include: walking  Therapies tried and outcome: nothing    Also complains of bilateral hand numbness L>R    Review of Systems   Constitutional, HEENT, cardiovascular, pulmonary, GI, , musculoskeletal, neuro, skin, endocrine and psych systems are negative, except as otherwise noted.      Objective    BP (!) 168/72 (BP Location: Left arm, Patient Position: Sitting, Cuff Size: Adult Large)   Pulse 86   Temp 98.6  F (37  C) (Oral)   Ht 1.854 m (6' 1\")   Wt (!) 155.6 kg (343 lb)   SpO2 95%   BMI 45.25 kg/m    Body mass index is 45.25 kg/m .  Physical Exam   GENERAL: healthy, alert and no distress  EYES: Eyes grossly normal to inspection, PERRL and conjunctivae and sclerae normal  HENT: ear canals and TM's normal, nose and mouth without ulcers or lesions  NECK: no adenopathy, no asymmetry, masses, or scars and thyroid normal to palpation  RESP: lungs clear to auscultation - no rales, rhonchi or wheezes  CV: regular rate and rhythm, normal S1 S2, no S3 or S4, no murmur, click or rub, no peripheral edema and peripheral pulses strong  ABDOMEN: soft, nontender, no hepatosplenomegaly, no masses and bowel sounds normal  MS: no gross musculoskeletal defects noted, no edema  MS: tenderness to palpation left knee pain  SKIN: no suspicious lesions or rashes  NEURO: Normal strength and tone, mentation intact and speech normal  BACK: no CVA tenderness, no paralumbar " tenderness    Results for orders placed or performed in visit on 11/04/20 (from the past 24 hour(s))   Hemoglobin A1c   Result Value Ref Range    Hemoglobin A1C 6.9 4.0 - 7.0 %             (M25.562,  G89.29) Chronic pain of left knee  (primary encounter diagnosis)  Comment: acute on top of chronic  Plan: XR Knee Left 1/2 Views, PHYSICAL THERAPY         REFERRAL            (E11.8) Type 2 diabetes mellitus with complication, without long-term current use of insulin (H)  Comment: well controlled  Plan: VENOUS COLLECTION, Hemoglobin A1c            (I10) Essential hypertension  Comment: well controllled at home  Plan:     CTS Bilateral EMG

## 2020-11-18 ENCOUNTER — TELEPHONE (OUTPATIENT)
Dept: FAMILY MEDICINE | Facility: CLINIC | Age: 75
End: 2020-11-18

## 2020-12-07 ENCOUNTER — MYC MEDICAL ADVICE (OUTPATIENT)
Dept: FAMILY MEDICINE | Facility: CLINIC | Age: 75
End: 2020-12-07

## 2020-12-08 ENCOUNTER — OFFICE VISIT (OUTPATIENT)
Dept: FAMILY MEDICINE | Facility: CLINIC | Age: 75
End: 2020-12-08

## 2020-12-08 VITALS
SYSTOLIC BLOOD PRESSURE: 160 MMHG | OXYGEN SATURATION: 97 % | HEART RATE: 81 BPM | DIASTOLIC BLOOD PRESSURE: 80 MMHG | BODY MASS INDEX: 46.57 KG/M2 | TEMPERATURE: 97.7 F | WEIGHT: 315 LBS

## 2020-12-08 DIAGNOSIS — G50.0 TRIGEMINAL NEURALGIA: ICD-10-CM

## 2020-12-08 DIAGNOSIS — G56.03 BILATERAL CARPAL TUNNEL SYNDROME: Primary | ICD-10-CM

## 2020-12-08 PROCEDURE — 99213 OFFICE O/P EST LOW 20 MIN: CPT | Performed by: FAMILY MEDICINE

## 2020-12-08 PROCEDURE — 36415 COLL VENOUS BLD VENIPUNCTURE: CPT | Performed by: FAMILY MEDICINE

## 2020-12-08 NOTE — TELEPHONE ENCOUNTER
Hard to know if sinus or trigeminal neuralgia maybe be best to be seen  With CTS if py would like to proceed can refer to hand surgeon Dr CAPPS

## 2020-12-08 NOTE — PROGRESS NOTES
SUBJECTIVE:  Pt in for F/U of trigeminal neuralgia has been having slight increase in symptoms   Also has has EMG which showed Tim CTS L> R  Pt with left hand numbness and dropping things   Also with waking with tingling    Patient Active Problem List   Diagnosis     Trigeminal neuralgia     Hypertension     Type 2 diabetes mellitus with complication, without long-term current use of insulin (H)     Arthritis     Sleep apnea     Hyperlipidemia     Venous insufficiency     Coronary artery disease     Adrenal mass (H)     Health Care Home     Morbid obesity (H)     Supraventricular tachycardia (H)     Cholecystitis     Penile pain     Basal cell carcinoma of lip     Cerebrovascular accident (CVA) due to occlusion of basilar artery (H)     Chest pain     Fibromyositis     Hyperlipidemia type II     Kidney stones     Multiple lung nodules     Myofascial pain     Neck pain     Arthralgia of temporomandibular joint     Current Outpatient Medications   Medication Instructions     amLODIPine (NORVASC) 2.5 mg, Oral, DAILY     aspirin (ASA) 81 mg, Oral, DAILY     BACLOFEN PO 10 mg, Oral, DAILY     blood glucose monitoring (NO BRAND SPECIFIED) meter device kit Use to test blood sugar one times daily or as directed.     carBAMazepine (TEGRETOL XR) 200 mg, Oral, 5 TIMES DAILY     clobetasol (TEMOVATE) 0.05 % ointment Topical, PRN     Desonide Crea-Wound Dress Crea (DESONIL CREAM EX) 0.05 oz, Apply externally, PRN, Taro     fexofenadine (ALLEGRA) 180 mg, Oral, DAILY     finasteride (PROSCAR) 5 mg, Oral, DAILY     fluticasone (FLONASE) 50 MCG/ACT nasal spray 1 spray, Both Nostrils, DAILY PRN     furosemide (LASIX) 20 mg, Oral, DAILY     gabapentin (NEURONTIN) 300 mg, Oral, 4 TIMES DAILY     ketoconazole (NIZORAL) 2 % external cream Topical, DAILY     lisinopril (ZESTRIL) 40 mg, Oral, DAILY     Magnesium 400 MG CAPS Oral, 2 TIMES DAILY     niacin (SLO-NIACIN) 250 mg, Oral, DAILY     omeprazole (PRILOSEC) 40 mg, Oral, 2 TIMES DAILY  BEFORE MEALS     Polyethylene Glycol 3350 (MIRALAX PO) 1 capful, Oral, Mixed in water once daily as needed.      PSYLLIUM HUSK  mg, Oral, DAILY     senna-docusate (SENOKOT-S/PERICOLACE) 8.6-50 MG tablet 2 tablets, Oral, 2 TIMES DAILY PRN     sitagliptin-metFORMIN (JANUMET)  MG tablet 1 tablet, Oral, 2 TIMES DAILY WITH MEALS     spironolactone (ALDACTONE) 25 mg, Oral, DAILY     UNABLE TO FIND MEDICATION NAME: mometasone, hyaluronic acid compound from dermatologist. <BR>Applying topically to areas of itchy skin, per patient.      VITAMIN D, CHOLECALCIFEROL, PO 5,000 Units, Oral, 2 TIMES DAILY     vitamin E 400 UNITS TABS 1 tablet, Oral, DAILY PRN     zinc sulfate (ZINCATE) 220 mg, Oral, DAILY      ROS: 10 point ROS neg other than the symptoms noted above in the HPI.    BP (!) 160/80 (BP Location: Right arm, Patient Position: Sitting, Cuff Size: Adult Large)   Pulse 81   Temp 97.7  F (36.5  C) (Oral)   Wt (!) 160.1 kg (353 lb)   SpO2 97%   BMI 46.57 kg/m    GENERAL: no apparent distress  EYES: Conjunctiva are not injected, no discharge.  EARS: Left TM -no erythema, no effusion,  not bulged.               Right TM -no erythema, no effusion,  not bulged.  NOSE: no discharge, no sinus tenderness  THROAT: no erythema, no exudate, no lesions  NECK: supple, no adenopathy.  CARDIAC: regular rate and rhythm, no murmur  RESP: clear, no wheezing, no rales, no rhonchi  ABD: soft, no distension, no tenderness  SKIN: No rashes    (G56.03) Bilateral carpal tunnel syndrome  (primary encounter diagnosis)  Comment: Dr Howard  Plan: ORTHOPEDICS PEDS REFERRAL            (G50.0) Trigeminal neuralgia  Comment:   Plan: Carbamazepine total 79101 ([facility])        Recheck levels

## 2020-12-08 NOTE — NURSING NOTE
Peterson is here to consult on hand surgeon and left nostril hurts.    Pre-Visit Screening:  Immunizations:UTD  Colonoscopy:UTD  Mammogram:NA  Asthma Action Test/Plan:Na  PHQ9:Na  GAD7:Na  Questioned patient about current smoking habits Pt.never smoked  OK to leave a detailed message on voice mail for today's visit yes, phone # [phone]

## 2020-12-09 LAB — CARBAMAZEPINE TOTAL: 9.3 MG/L (ref 4–12)

## 2020-12-15 DIAGNOSIS — G50.0 TRIGEMINAL NEURALGIA: Primary | ICD-10-CM

## 2020-12-15 DIAGNOSIS — R60.9 EDEMA, UNSPECIFIED TYPE: ICD-10-CM

## 2020-12-15 DIAGNOSIS — I10 ESSENTIAL HYPERTENSION, BENIGN: ICD-10-CM

## 2020-12-15 RX ORDER — FUROSEMIDE 20 MG
20 TABLET ORAL DAILY
Qty: 90 TABLET | Refills: 1 | Status: SHIPPED | OUTPATIENT
Start: 2020-12-15 | End: 2021-06-03

## 2020-12-15 RX ORDER — LISINOPRIL 40 MG/1
40 TABLET ORAL DAILY
Qty: 90 TABLET | Refills: 1 | Status: SHIPPED | OUTPATIENT
Start: 2020-12-15 | End: 2021-06-03

## 2020-12-15 RX ORDER — SPIRONOLACTONE 25 MG/1
25 TABLET ORAL DAILY
Qty: 90 TABLET | Refills: 1 | Status: SHIPPED | OUTPATIENT
Start: 2020-12-15 | End: 2021-06-03

## 2020-12-15 RX ORDER — BACLOFEN 10 MG/1
10 TABLET ORAL DAILY
Qty: 180 TABLET | Refills: 0 | Status: SHIPPED | OUTPATIENT
Start: 2020-12-15 | End: 2021-06-03

## 2020-12-15 NOTE — TELEPHONE ENCOUNTER
Just received this request from pharmacy, last OV 11/4/2020. please advise. Thanks    Pending Prescriptions:                       Disp   Refills    baclofen (LIORESAL) 10 MG tablet          180 ta*0            Sig: Take 1 tablet (10 mg) by mouth daily

## 2020-12-15 NOTE — TELEPHONE ENCOUNTER
Last OV 11/4/2020, medications not filled at that visit.     Pending Prescriptions:                       Disp   Refills    lisinopril (ZESTRIL) 40 MG tablet         90 tab*1            Sig: Take 1 tablet (40 mg) by mouth daily    spironolactone (ALDACTONE) 25 MG tablet   90 tab*1            Sig: Take 1 tablet (25 mg) by mouth daily    furosemide (LASIX) 20 MG tablet           90 tab*1            Sig: Take 1 tablet (20 mg) by mouth daily

## 2021-01-04 ENCOUNTER — OFFICE VISIT (OUTPATIENT)
Dept: FAMILY MEDICINE | Facility: CLINIC | Age: 76
End: 2021-01-04

## 2021-01-04 VITALS
HEIGHT: 73 IN | BODY MASS INDEX: 41.75 KG/M2 | DIASTOLIC BLOOD PRESSURE: 80 MMHG | OXYGEN SATURATION: 99 % | WEIGHT: 315 LBS | SYSTOLIC BLOOD PRESSURE: 150 MMHG | HEART RATE: 86 BPM | TEMPERATURE: 99.5 F

## 2021-01-04 DIAGNOSIS — I10 ESSENTIAL HYPERTENSION: ICD-10-CM

## 2021-01-04 DIAGNOSIS — E11.8 TYPE 2 DIABETES MELLITUS WITH COMPLICATION, WITHOUT LONG-TERM CURRENT USE OF INSULIN (H): ICD-10-CM

## 2021-01-04 DIAGNOSIS — Z01.818 PRE-OP EXAM: Primary | ICD-10-CM

## 2021-01-04 DIAGNOSIS — G56.02 CARPAL TUNNEL SYNDROME OF LEFT WRIST: ICD-10-CM

## 2021-01-04 LAB
% GRANULOCYTES: 63.5 %
HCT VFR BLD AUTO: 42.7 % (ref 40–53)
HEMOGLOBIN: 13.6 G/DL (ref 13.3–17.7)
LYMPHOCYTES NFR BLD AUTO: 24.8 %
MCH RBC QN AUTO: 31.2 PG (ref 26–33)
MCHC RBC AUTO-ENTMCNC: 31.9 G/DL (ref 31–36)
MCV RBC AUTO: 98 FL (ref 78–100)
MONOCYTES NFR BLD AUTO: 11.7 %
PLATELET COUNT - QUEST: 280 10^9/L (ref 150–375)
RBC # BLD AUTO: 4.36 10*12/L (ref 4.4–5.9)
WBC # BLD AUTO: 6.8 10*9/L (ref 4–11)

## 2021-01-04 PROCEDURE — 85025 COMPLETE CBC W/AUTO DIFF WBC: CPT | Performed by: FAMILY MEDICINE

## 2021-01-04 PROCEDURE — 80048 BASIC METABOLIC PNL TOTAL CA: CPT | Performed by: FAMILY MEDICINE

## 2021-01-04 PROCEDURE — 99214 OFFICE O/P EST MOD 30 MIN: CPT | Mod: 25 | Performed by: FAMILY MEDICINE

## 2021-01-04 PROCEDURE — 93000 ELECTROCARDIOGRAM COMPLETE: CPT | Performed by: FAMILY MEDICINE

## 2021-01-04 PROCEDURE — 36415 COLL VENOUS BLD VENIPUNCTURE: CPT | Performed by: FAMILY MEDICINE

## 2021-01-04 ASSESSMENT — MIFFLIN-ST. JEOR: SCORE: 2334.72

## 2021-01-04 NOTE — PROGRESS NOTES
[facility]  [address]  [address]  [facility] [address]  [phone]  Dept: [phone]    PRE-OP EVALUATION:  Today's date: 2021    Jovi Aldana (: 1945) presents for pre-operative evaluation assessment as requested by Dr. Howard.  He requires evaluation and anesthesia risk assessment prior to undergoing surgery/procedure for treatment of left wrist.    Proposed Surgery/ Procedure: left wrist  Date of Surgery/ Procedure: 2021  Time of Surgery/ Procedure: 8am  Hospital/Surgical Facility: [facility]  Surgery Fax Number: [phone]  Primary Physician: Kemar Collins  Type of Anesthesia Anticipated: to be determined    Preoperative Questionnaire:   YES - HAVE YOU EVER HAD A HEART ATTACK OR STROKE? Tiny stroke effected vision   No - Have you ever had surgery on your heart or blood vessels, such as a stent, coronary (heart) bypass, or surgery on an artery in the head, neck, heart, or legs?  No - Do you have chest pain when you are physically active?  No - Do you have a history of heart failure?  No - Do you currently have a cold, bronchitis, or symptoms of other respiratory (head and chest) infections?  No - Do you have a cough, shortness of breath, or wheezing?  No - Do you or anyone in your family have a history of blood clots?  No - Do you or anyone in your family have a serious bleeding problem, such as long-lasting bleeding after surgeries or cuts?  No - Have you ever had anemia or been told to take iron pills?  No - Have you had any abnormal blood loss such as black, tarry or bloody stools, or abnormal vaginal bleeding?  No - Have you ever had a blood transfusion?  Yes - Are you willing to have a blood transfusion if it is medically needed before, during, or after your surgery?  No - Have you or anyone in your family ever had problems with anesthesia (sedation for surgery)?  YES - DO YOU HAVE SLEEP APNEA, EXCESSIVE SNORING, OR  DAYTIME DROWSINESS?  DO YOU HAVE A CPAP MACHINE? yes  No - Do you have any artifical heart valves or other implanted medical devices, such as a pacemaker, defibrillator, or continuous glucose monitor?  No - Do you have any artifical joints?  No - Are you allergic to latex?  No - Is there any chance that you may be pregnant?    Patient has a Health Care Directive or Living Will:  YES     HPI:     HPI related to upcoming procedure: decrease sensation in left fingers      DIABETES - Patient has a longstanding history of DiabetesType Type II . Patient is being treated with diet and oral agents and denies significant side effects. Control has been good. Complicating factors include but are not limited to: hypertension.     HYPERTENSION - Patient has longstanding history of HTN , currently denies any symptoms referable to elevated blood pressure. Specifically denies chest pain, palpitations, dyspnea, orthopnea, PND or peripheral edema. Blood pressure readings have been in normal range. Current medication regimen is as listed below. Patient denies any side effects of medication.     SLEEP PROBLEM - Patient has a longstanding history of sleep apnea.. Patient has tried OTC medications with limited success.       MEDICAL HISTORY:     Patient Active Problem List    Diagnosis Date Noted     Cholecystitis 07/24/2020     Priority: Medium     Added automatically from request for surgery [id number]       Penile pain 07/24/2020     Priority: Medium     Added automatically from request for surgery [id number]       Morbid obesity (H) 05/05/2020     Priority: Medium     Supraventricular tachycardia (H) 05/05/2020     Priority: Medium     Myofascial pain 09/13/2017     Priority: Medium     Cerebrovascular accident (CVA) due to occlusion of basilar artery (H) 02/15/2017     Priority: Medium     Hypertension      Priority: Medium     Type 2 diabetes mellitus with complication, without long-term current use of insulin (H)      Priority: Medium  "    Arthritis      Priority: Medium     Sleep apnea      Priority: Medium     CPAP       Hyperlipidemia      Priority: Medium     Venous insufficiency      Priority: Medium     Coronary artery disease      Priority: Medium     Adrenal mass (H)      Priority: Medium     Multiple lung nodules 08/01/2016     Priority: Medium     Chest pain 08/19/2015     Priority: Medium     Trigeminal neuralgia 06/12/2013     Priority: Medium     Fibromyositis 02/07/2013     Priority: Medium     Neck pain 02/07/2013     Priority: Medium     Arthralgia of temporomandibular joint 02/07/2013     Priority: Medium     Basal cell carcinoma of lip 10/24/2012     Priority: Medium     Hyperlipidemia type II 04/14/2010     Priority: Medium     Kidney stones 04/14/2010     Priority: Medium     Health Care Home 05/05/2020     Priority: Low      Past Medical History:   Diagnosis Date     Adrenal mass (H)      Arthritis      Complication of anesthesia     \"hard time waking up\" after vein stripping     Coronary artery disease      Diabetes mellitus (H)      Gout      Heartburn      Hyperlipidemia      Hypertension      Mumps      PONV (postoperative nausea and vomiting)      Renal disease     stone     Sleep apnea     CPAP     Venous insufficiency      Past Surgical History:   Procedure Laterality Date     COLONOSCOPY       COLONOSCOPY  5/15/2012    Procedure:COLONOSCOPY; COLONOSCOPY; Surgeon:ALICIA HART; Location: GI     CYSTOSCOPY N/A 8/11/2020    Procedure: (1) CYSTOSCOPY, exam under anesthesia, urethral dialation;  Surgeon: Saurav Mane MD;  Location:  OR     ESOPHAGOSCOPY, GASTROSCOPY, DUODENOSCOPY (EGD), COMBINED N/A 5/2/2018    Procedure: COMBINED ESOPHAGOSCOPY, GASTROSCOPY, DUODENOSCOPY (EGD);  ESOPHAGOSCOPY, GASTROSCOPY, DUODENOSCOPY with biopsies, and esophogeal dilation. ;  Surgeon: Mic Gilman MD;  Location:  OR     GENITOURINARY SURGERY  1989    lithrotripsy     LAPAROSCOPIC CHOLECYSTECTOMY N/A 8/11/2020    Procedure: " (2) LAPAROSCOPIC CHOLECYSTECTOMY;  Surgeon: Floyd Smith MD;  Location: [facility] OR     ORTHOPEDIC SURGERY  1989    laminectomy L 4-5     ORTHOPEDIC SURGERY  1996    disectomy      VASCULAR SURGERY      vein stripping     Current Outpatient Medications   Medication Sig Dispense Refill     amLODIPine (NORVASC) 2.5 MG tablet Take 1 tablet (2.5 mg) by mouth daily 90 tablet 1     baclofen (LIORESAL) 10 MG tablet Take 1 tablet (10 mg) by mouth daily 180 tablet 0     blood glucose monitoring (NO BRAND SPECIFIED) meter device kit Use to test blood sugar one times daily or as directed. 1 kit 0     carBAMazepine (TEGRETOL XR) 200 MG 12 hr tablet Take 1 tablet (200 mg) by mouth 5 times daily 450 tablet 1     clobetasol (TEMOVATE) 0.05 % ointment Apply topically as needed        Desonide Crea-Wound Dress Crea (DESONIL CREAM EX) Externally apply 0.05 oz topically as needed Taro        fexofenadine (ALLEGRA) 180 MG tablet Take 180 mg by mouth daily       finasteride (PROSCAR) 5 MG tablet Take 1 tablet (5 mg) by mouth daily 90 tablet 3     fluticasone (FLONASE) 50 MCG/ACT nasal spray Spray 1 spray into both nostrils daily as needed        furosemide (LASIX) 20 MG tablet Take 1 tablet (20 mg) by mouth daily 90 tablet 1     gabapentin (NEURONTIN) 300 MG capsule Take 1 capsule (300 mg) by mouth 4 times daily 360 capsule 1     ketoconazole (NIZORAL) 2 % external cream Apply topically daily 30 g 3     lisinopril (ZESTRIL) 40 MG tablet Take 1 tablet (40 mg) by mouth daily 90 tablet 1     Magnesium 400 MG CAPS Take by mouth 2 times daily       niacin (SLO-NIACIN) 250 MG TBCR CR tablet Take 250 mg by mouth daily        omeprazole (PRILOSEC) 40 MG DR capsule Take 1 capsule (40 mg) by mouth 2 times daily (before meals) 90 capsule 3     Polyethylene Glycol 3350 (MIRALAX PO) Take 1 capful by mouth Mixed in water once daily as needed.       PSYLLIUM HUSK PO Take 750 mg by mouth daily        senna-docusate (SENOKOT-S/PERICOLACE) 8.6-50 MG  "tablet Take 2 tablets by mouth 2 times daily as needed        sitagliptin-metFORMIN (JANUMET)  MG tablet Take 1 tablet by mouth 2 times daily (with meals) 60 tablet 11     spironolactone (ALDACTONE) 25 MG tablet Take 1 tablet (25 mg) by mouth daily 90 tablet 1     UNABLE TO FIND MEDICATION NAME: mometasone, hyaluronic acid compound from dermatologist.   Applying topically to areas of itchy skin, per patient.       VITAMIN D, CHOLECALCIFEROL, PO Take 5,000 Units by mouth 2 times daily       vitamin E 400 UNITS TABS Take 1 tablet by mouth daily as needed        zinc sulfate (ZINCATE) 220 (50 Zn) MG capsule Take 220 mg by mouth daily       aspirin 81 MG tablet Take 81 mg by mouth daily       OTC products: allegra    Allergies   Allergen Reactions     Arthrotec      Atorvastatin      Muscle aches     Augmentin Diarrhea     Carvedilol      Patient had arthralgias, he attributed to carvedilol.     Celebrex [Celecoxib]      Stomach pain     Colestipol      Muscle pain, occurred in Jan 2011     Cozaar [Losartan]      Crestor [Rosuvastatin]      Gemfibrozil      Glucotrol [Glipizide]      Hmg-Coa-R Inhibitors      Muscle Pain     Ibuprofen      Bloody noses a couple of times on this medication.  Occurred in 12/2011     Nabumetone      Relafin - stomach pain     Oxycodone-Acetaminophen Other (See Comments)     Patient states it makes him\"loopy\"     Ozempic [Semaglutide]      trialed x several weeks, extreme nausea.     Vioxx [Rofecoxib]      Zocor [Simvastatin]      Erythromycin Rash     Ketoconazole Rash     Was to treat a rash and the rash worsened.     Sulfabenzamide Rash     Zetia [Ezetimibe] Rash     Happened in 2006      Latex Allergy: NO    Social History     Tobacco Use     Smoking status: Never Smoker     Smokeless tobacco: Never Used   Substance Use Topics     Alcohol use: No     History   Drug Use Unknown       REVIEW OF SYSTEMS:   CONSTITUTIONAL: NEGATIVE for fever, chills, change in " weight  INTEGUMENTARY/SKIN: NEGATIVE for worrisome rashes, moles or lesions  EYES: NEGATIVE for vision changes or irritation  ENT/MOUTH: NEGATIVE for ear, mouth and throat problems  RESP: NEGATIVE for significant cough or SOB  BREAST: NEGATIVE for masses, tenderness or discharge  CV: NEGATIVE for chest pain, palpitations or peripheral edema  GI: NEGATIVE for nausea, abdominal pain, heartburn, or change in bowel habits  : NEGATIVE for frequency, dysuria, or hematuria  MUSCULOSKELETAL: NEGATIVE for significant arthralgias or myalgia  NEURO: NEGATIVE for weakness, dizziness or paresthesias  ENDOCRINE: NEGATIVE for temperature intolerance, skin/hair changes  HEME: NEGATIVE for bleeding problems  PSYCHIATRIC: NEGATIVE for changes in mood or affect    EXAM:   There were no vitals taken for this visit.    GENERAL APPEARANCE: healthy, alert and no distress    GENERAL APPEARANCE: over weight     EYES: EOMI,  PERRL     HENT: ear canals and TM's normal and nose and mouth without ulcers or lesions     NECK: no adenopathy, no asymmetry, masses, or scars and thyroid normal to palpation     RESP: lungs clear to auscultation - no rales, rhonchi or wheezes     CV: regular rates and rhythm, normal S1 S2, no S3 or S4 and no murmur, click or rub     ABDOMEN:  soft, nontender, no HSM or masses and bowel sounds normal     MS: extremities normal- no gross deformities noted, no evidence of inflammation in joints, FROM in all extremities.     SKIN: no suspicious lesions or rashes     NEURO: Normal strength and tone, sensory exam grossly normal, mentation intact and speech normal     PSYCH: mentation appears normal. and affect normal/bright     LYMPHATICS: No cervical adenopathy    DIAGNOSTICS:     Labs Resulted Today:   Results for orders placed or performed in visit on 01/04/21   HEMOGRAM PLATELET DIFF (BFP)     Status: Abnormal   Result Value Ref Range    WBC 6.8 4.0 - 11 10*9/L    RBC Count 4.36 (A) 4.4 - 5.9 10*12/L    Hemoglobin 13.6  13.3 - 17.7 g/dL    Hematocrit 42.7 40.0 - 53.0 %    MCV 98.0 78 - 100 fL    MCH 31.2 26 - 33 pg    MCHC 31.9 31 - 36 g/dL    Platelet Count 280 150 - 375 10^9/L    % Granulocytes 63.5 %    % Lymphocytes 24.8 %    % Monocytes 11.7 %       Recent Labs   Lab Test 11/04/20  1449 08/04/20 05/05/20 08/01/16  0000 08/01/16   HGB  --  13.9  --   --  13.5  13.5   PLT  --  288  --   --  224  224   NA  --  137.2 137.9   < > 137   POTASSIUM  --  4.42 5.11   < > 5   CR  --  0.82 0.84   < > 0.82   A1C 6.9 7.2* 7.5*   < > 7.9    < > = values in this interval not displayed.        IMPRESSION:   Reason for surgery/procedure: CTS    The proposed surgical procedure is considered LOW risk.    REVISED CARDIAC RISK INDEX  The patient has the following serious cardiovascular risks for perioperative complications such as (MI, PE, VFib and 3  AV Block):  No serious cardiac risks  INTERPRETATION: 1 risks: Class II (low risk - 0.9% complication rate)    The patient has the following additional risks for perioperative complications:  Morbid obesity      ICD-10-CM    1. Pre-op exam  Z01.818 HEMOGRAM PLATELET DIFF (BFP)     EKG 12-lead complete w/read - Clinics     Basic Metabolic Panel (BFP)     VENOUS COLLECTION   2. Carpal tunnel syndrome of left wrist  G56.02    3. Type 2 diabetes mellitus with complication, without long-term current use of insulin (H)  E11.8 Last A1c 6.9   4. Essential hypertension  I10 HEMOGRAM PLATELET DIFF (BFP)     EKG 12-lead complete w/read - Clinics     Basic Metabolic Panel (BFP)       RECOMMENDATIONS:         --Patient is to take all scheduled medications on the day of surgery    APPROVAL GIVEN to proceed with proposed procedure, without further diagnostic evaluation       Signed Electronically by: Kemar Collins MD    Copy of this evaluation report is provided to requesting physician.    [place] Preop Guidelines    Revised Cardiac Risk Index

## 2021-01-05 LAB
BUN SERPL-MCNC: 9 MG/DL (ref 7–25)
BUN/CREATININE RATIO: 12 (ref 6–22)
CALCIUM SERPL-MCNC: 9 MG/DL (ref 8.6–10.3)
CHLORIDE SERPLBLD-SCNC: 97.5 MMOL/L (ref 98–110)
CO2 SERPL-SCNC: 31 MMOL/L (ref 20–32)
CREAT SERPL-MCNC: 0.75 MG/DL (ref 0.7–1.18)
GLUCOSE SERPL-MCNC: 170 MG/DL (ref 60–99)
POTASSIUM SERPL-SCNC: 5.07 MMOL/L (ref 3.5–5.3)
SODIUM SERPL-SCNC: 135.4 MMOL/L (ref 135–146)

## 2021-01-15 ENCOUNTER — HEALTH MAINTENANCE LETTER (OUTPATIENT)
Age: 76
End: 2021-01-15

## 2021-01-22 DIAGNOSIS — I10 ESSENTIAL HYPERTENSION: ICD-10-CM

## 2021-01-22 RX ORDER — AMLODIPINE BESYLATE 2.5 MG/1
2.5 TABLET ORAL DAILY
Qty: 90 TABLET | Refills: 1 | Status: SHIPPED | OUTPATIENT
Start: 2021-01-22 | End: 2021-07-20

## 2021-02-04 ENCOUNTER — OFFICE VISIT (OUTPATIENT)
Dept: FAMILY MEDICINE | Facility: CLINIC | Age: 76
End: 2021-02-04

## 2021-02-04 VITALS
RESPIRATION RATE: 20 BRPM | HEART RATE: 87 BPM | OXYGEN SATURATION: 98 % | TEMPERATURE: 97.9 F | BODY MASS INDEX: 41.75 KG/M2 | HEIGHT: 73 IN | WEIGHT: 315 LBS | SYSTOLIC BLOOD PRESSURE: 164 MMHG | DIASTOLIC BLOOD PRESSURE: 68 MMHG

## 2021-02-04 DIAGNOSIS — E11.8 TYPE 2 DIABETES MELLITUS WITH COMPLICATION, WITHOUT LONG-TERM CURRENT USE OF INSULIN (H): Primary | ICD-10-CM

## 2021-02-04 DIAGNOSIS — E78.2 MIXED HYPERLIPIDEMIA: ICD-10-CM

## 2021-02-04 DIAGNOSIS — M17.12 PRIMARY OSTEOARTHRITIS OF LEFT KNEE: ICD-10-CM

## 2021-02-04 DIAGNOSIS — Z86.73 HISTORY OF CVA (CEREBROVASCULAR ACCIDENT): ICD-10-CM

## 2021-02-04 DIAGNOSIS — G62.9 PERIPHERAL POLYNEUROPATHY: ICD-10-CM

## 2021-02-04 DIAGNOSIS — I10 ESSENTIAL HYPERTENSION: ICD-10-CM

## 2021-02-04 DIAGNOSIS — G47.33 OBSTRUCTIVE SLEEP APNEA SYNDROME: ICD-10-CM

## 2021-02-04 DIAGNOSIS — G50.0 TRIGEMINAL NEURALGIA: ICD-10-CM

## 2021-02-04 DIAGNOSIS — E66.01 MORBID OBESITY (H): ICD-10-CM

## 2021-02-04 LAB — HBA1C MFR BLD: 6.7 % (ref 4–7)

## 2021-02-04 PROCEDURE — 99214 OFFICE O/P EST MOD 30 MIN: CPT | Performed by: FAMILY MEDICINE

## 2021-02-04 PROCEDURE — 36415 COLL VENOUS BLD VENIPUNCTURE: CPT | Performed by: FAMILY MEDICINE

## 2021-02-04 PROCEDURE — 83036 HEMOGLOBIN GLYCOSYLATED A1C: CPT | Performed by: FAMILY MEDICINE

## 2021-02-04 RX ORDER — CARBAMAZEPINE 200 MG/1
200 TABLET, EXTENDED RELEASE ORAL
Qty: 450 TABLET | Refills: 1 | COMMUNITY
Start: 2021-02-04 | End: 2021-04-15

## 2021-02-04 ASSESSMENT — MIFFLIN-ST. JEOR: SCORE: 2334.72

## 2021-02-04 NOTE — NURSING NOTE
Jovi Aldana is here for an A1C.    Questioned patient about current smoking habits.  Pt. has never smoked.  PULSE regular  My Chart: active  CLASSIFICATION OF OVERWEIGHT AND OBESITY BY BMI                        Obesity Class           BMI(kg/m2)  Underweight                                    < 18.5  Normal                                         18.5-24.9  Overweight                                     25.0-29.9  OBESITY                     I                  30.0-34.9                             II                 35.0-39.9  EXTREME OBESITY             III                >40                            Patient's  BMI Body mass index is 45.25 kg/m .  http://hin.nhlbi.nih.gov/menuplanner/menu.cgi  Pre-visit planning  Immunizations - needs Td at pharmacy  Colonoscopy -   Mammogram -   Asthma -   PHQ9 -    RHODA-7 -

## 2021-02-04 NOTE — PROGRESS NOTES
"  Assessment & Plan     Type 2 diabetes mellitus with complication, without long-term current use of insulin (H)  Well controlled, continue current medications at current doses   - VENOUS COLLECTION  - Hemoglobin A1c (BFP)    Trigeminal neuralgia  stable symptomatically , continue current medications at current doses   - carBAMazepine (TEGRETOL XR) 200 MG 12 hr tablet  Dispense: 450 tablet; Refill: 1    Mixed hyperlipidemia  Unable to tolerate statins so on niacin only    Essential hypertension  Elevated here but states OK at home, continue current medications at current doses Check blood pressure readings outside of the clinic several times per week, write down values, and follow up if elevated within the next several weeks. Blood pressure can be checked at the firestation, drugstore,  or any valid site.     Primary osteoarthritis of left knee  Left knee bone on bone, recommend ortho as needed    Morbid obesity (H)  Continue to work on healthy diet and exercise, discussed healthy habits     Obstructive sleep apnea syndrome  Cpap    History of CVA (cerebrovascular accident)  Resolved issues with left eye visual fialed    Peripheral polyneuropathy  stable symptomatically , continue current medications at current doses       Review of external notes as documented above   Review of the result(s) of each unique test - labs                   BMI:   Estimated body mass index is 45.25 kg/m  as calculated from the following:    Height as of this encounter: 1.854 m (6' 1\").    Weight as of this encounter: 155.6 kg (343 lb).   Weight management plan: Discussed healthy diet and exercise guidelines      FUTURE APPOINTMENTS:       - Follow-up visit in 6 mo  Work on weight loss  Regular exercise    No follow-ups on file.    Josafat Marroquin MD  [facility]    Subjective     Peterson is a 75 year old who presents to clinic today for the following health issues  accompanied by his spouse:    HPI       Diabetes " Follow-up    How often are you checking your blood sugar? A few times a month  What time of day are you checking your blood sugars (select all that apply)?  Before meals  Have you had any blood sugars above 200?  No  Have you had any blood sugars below 70?  No    What symptoms do you notice when your blood sugar is low?  None    What concerns do you have today about your diabetes? None     Do you have any of these symptoms? (Select all that apply)  Numbness in feet    Have you had a diabetic eye exam in the last 12 months? yes                Hyperlipidemia Follow-Up      Are you regularly taking any medication or supplement to lower your cholesterol?   No-did not tolerate any statins    Are you having muscle aches or other side effects that you think could be caused by your cholesterol lowering medication?  No    Hypertension Follow-up      Do you check your blood pressure regularly outside of the clinic? Yes     Are you following a low salt diet? No     Are your blood pressures ever more than 140 on the top number (systolic) OR more   than 90 on the bottom number (diastolic), for example 140/90? No-    BP Readings from Last 2 Encounters:   02/04/21 (!) 164/68   01/04/21 (!) 150/80     Hemoglobin A1C (%)   Date Value   02/04/2021 6.7   11/04/2020 6.9     LDL Cholesterol Calculated (mg/dL)   Date Value   08/09/2017 178 (H)   01/30/2017 184 (H)       Cerebrovascular Follow-up      Patient history: unsure, had visual field loss, mostly back to normal    Residual symptoms: None    Worsened or new symptoms since last visit: No    Daily aspirin use: Yes    Hypertension controlled: unclear      How many servings of fruits and vegetables do you eat daily?  2-3    On average, how many sweetened beverages do you drink each day (Examples: soda, juice, sweet tea, etc.  Do NOT count diet or artificially sweetened beverages)?   1    How many days per week do you exercise enough to make your heart beat faster? 3 or less    How  "many minutes a day do you exercise enough to make your heart beat faster? 9 or less    How many days per week do you miss taking your medication? 0    Pt uses carbamazepine for [place] as well as baclofen, working well    Pt is not interested in getting left knee replaced        Review of Systems   Constitutional, HEENT, cardiovascular, pulmonary, gi and gu systems are negative, except as otherwise noted.      Objective    BP (!) 164/68 (BP Location: Right arm, Patient Position: Chair, Cuff Size: Adult Large)   Pulse 87   Temp 97.9  F (36.6  C)   Resp 20   Ht 1.854 m (6' 1\")   Wt (!) 155.6 kg (343 lb)   SpO2 98%   BMI 45.25 kg/m    Body mass index is 45.25 kg/m .  Physical Exam   GENERAL: healthy, alert and no distress  NECK: no adenopathy, no asymmetry, masses, or scars and thyroid normal to palpation  RESP: lungs clear to auscultation - no rales, rhonchi or wheezes  CV: regular rate and rhythm, normal S1 S2, no S3 or S4, no murmur, click or rub, no peripheral edema and peripheral pulses strong  ABDOMEN: soft, nontender, no hepatosplenomegaly, no masses and bowel sounds normal  MS: no gross musculoskeletal defects noted, no edema  PSYCH: mentation appears normal, affect normal/bright    Results for orders placed or performed in visit on 02/04/21 (from the past 24 hour(s))   Hemoglobin A1c (BFP)   Result Value Ref Range    Hemoglobin A1C 6.7 4.0 - 7.0 %                 "

## 2021-02-12 ENCOUNTER — TELEPHONE (OUTPATIENT)
Dept: FAMILY MEDICINE | Facility: CLINIC | Age: 76
End: 2021-02-12

## 2021-02-26 ENCOUNTER — TRANSFERRED RECORDS (OUTPATIENT)
Dept: FAMILY MEDICINE | Facility: CLINIC | Age: 76
End: 2021-02-26

## 2021-03-08 ENCOUNTER — OFFICE VISIT (OUTPATIENT)
Dept: FAMILY MEDICINE | Facility: CLINIC | Age: 76
End: 2021-03-08

## 2021-03-08 DIAGNOSIS — Z00.00 ENCOUNTER FOR INITIAL ANNUAL WELLNESS VISIT (AWV) IN MEDICARE PATIENT: Primary | ICD-10-CM

## 2021-03-08 PROCEDURE — G0438 PPPS, INITIAL VISIT: HCPCS | Performed by: FAMILY MEDICINE

## 2021-03-08 NOTE — PROGRESS NOTES
"SUBJECTIVE:   Jovi Aldana is a 75 year old male who presents for Preventive Visit.     This visit is being conducted as a virtual visit due to the emphasis on mitigation of the COVID-19 virus pandemic. The clinician has decided that the risk of an in-office visit outweighs the benefit for this patient.      The patient has been notified of following:   \"This telemed visit will be conducted via a virtual communication between you and your physician/provider using D[employer]-pt consents verbally to telemedicine visit. .This visit is done via synchronous audio/visual communication. We have found that certain health care needs can be provided without the need for a physical exam.  This service lets us provide the care you need with a virtual visit  If a prescription is necessary we can send it directly to your pharmacy.  If lab work is needed we can place an order for that and you can then stop by our lab to have the test done at a later time.    This visit takes place with the patient at home and provider in clinic      Patient has been advised of split billing requirements and indicates understanding: Yes   Are you in the first 12 months of your Medicare coverage?  No    HPI  Do you feel safe in your environment? Yes    Have you ever done Advance Care Planning? (For example, a Health Directive, POLST, or a discussion with a medical provider or your loved ones about your wishes): Yes, advance care planning is on file.    Hearing test done and OK- no aids   Fall risk  Unable to complete due to virtual visit; need for additional assessment in future face-to-face visit    Pt using walker at all times, diabetic neuropathy has made gait poor  Cognitive Screening Unable to complete due to virtual visit; need for additional assessment in future face-to-face visit    Do you have sleep apnea, excessive snoring or daytime drowsiness?: yes    Reviewed and updated as needed this visit by clinical staff                 Reviewed " and updated as needed this visit by Provider                Social History     Tobacco Use     Smoking status: Never Smoker     Smokeless tobacco: Never Used   Substance Use Topics     Alcohol use: No     If you drink alcohol do you typically have >3 drinks per day or >7 drinks per week? No    No flowsheet data found.        Patient Active Problem List   Diagnosis     Trigeminal neuralgia     Hypertension     Type 2 diabetes mellitus with complication, without long-term current use of insulin (H)     Arthritis     Sleep apnea     Hyperlipidemia     Venous insufficiency     Coronary artery disease     Adrenal mass (H)     Health Care Home     Morbid obesity (H)     Supraventricular tachycardia (H)     Cholecystitis     Penile pain     Basal cell carcinoma of lip     Cerebrovascular accident (CVA) due to occlusion of basilar artery (H)     Chest pain     Fibromyositis     Hyperlipidemia type II     Kidney stones     Multiple lung nodules     Myofascial pain     Neck pain     Arthralgia of temporomandibular joint        Current providers sharing in care for this patient include:   Patient Care Team:  Josafat Marroquin MD as PCP - General (Family Medicine)  Saurav Mane MD as Assigned Surgical Provider  Josafat Marroquin MD as Assigned PCP    The following health maintenance items are reviewed in Epic and correct as of today:  Health Maintenance   Topic Date Due     MICROALBUMIN  Never done     DIABETIC FOOT EXAM  Never done     EYE EXAM  Never done     COVID-19 Vaccine (1 of 2) Never done     HEPATITIS C SCREENING  Never done     ZOSTER IMMUNIZATION (2 of 3) 05/12/2007     MEDICARE ANNUAL WELLNESS VISIT  Never done     DTAP/TDAP/TD IMMUNIZATION (2 - Td) 06/09/2018     LIPID  08/09/2018     PHQ-2  01/01/2021     A1C  08/04/2021     BMP  01/05/2022     FALL RISK ASSESSMENT  02/04/2022     ADVANCE CARE PLANNING  08/24/2025     INFLUENZA VACCINE  Completed     Pneumococcal Vaccine: Pediatrics (0 to 5  Years) and At-Risk Patients (6 to 64 Years)  Completed     Pneumococcal Vaccine: 65+ Years  Completed     IPV IMMUNIZATION  Aged Out     MENINGITIS IMMUNIZATION  Aged Out     BP Readings from Last 3 Encounters:   02/04/21 (!) 164/68   01/04/21 (!) 150/80   12/08/20 (!) 160/80    Wt Readings from Last 3 Encounters:   02/04/21 (!) 155.6 kg (343 lb)   01/04/21 (!) 155.6 kg (343 lb)   12/08/20 (!) 160.1 kg (353 lb)                  Patient Active Problem List   Diagnosis     Trigeminal neuralgia     Hypertension     Type 2 diabetes mellitus with complication, without long-term current use of insulin (H)     Arthritis     Sleep apnea     Hyperlipidemia     Venous insufficiency     Coronary artery disease     Adrenal mass (H)     Health Care Home     Morbid obesity (H)     Supraventricular tachycardia (H)     Cholecystitis     Penile pain     Basal cell carcinoma of lip     Cerebrovascular accident (CVA) due to occlusion of basilar artery (H)     Chest pain     Fibromyositis     Hyperlipidemia type II     Kidney stones     Multiple lung nodules     Myofascial pain     Neck pain     Arthralgia of temporomandibular joint     Past Surgical History:   Procedure Laterality Date     COLONOSCOPY       COLONOSCOPY  5/15/2012    Procedure:COLONOSCOPY; COLONOSCOPY; Surgeon:ALICIA HART; Location: GI     CYSTOSCOPY N/A 8/11/2020    Procedure: (1) CYSTOSCOPY, exam under anesthesia, urethral dialation;  Surgeon: Saurav Mane MD;  Location:  OR     ESOPHAGOSCOPY, GASTROSCOPY, DUODENOSCOPY (EGD), COMBINED N/A 5/2/2018    Procedure: COMBINED ESOPHAGOSCOPY, GASTROSCOPY, DUODENOSCOPY (EGD);  ESOPHAGOSCOPY, GASTROSCOPY, DUODENOSCOPY with biopsies, and esophogeal dilation. ;  Surgeon: Mic Gilman MD;  Location:  OR     GENITOURINARY SURGERY  1989    lithrotripsy     LAPAROSCOPIC CHOLECYSTECTOMY N/A 8/11/2020    Procedure: (2) LAPAROSCOPIC CHOLECYSTECTOMY;  Surgeon: Floyd Smith MD;  Location:  OR     ORTHOPEDIC  SURGERY  1989    laminectomy L 4-5     ORTHOPEDIC SURGERY  1996    disectomy      VASCULAR SURGERY      vein stripping       Social History     Tobacco Use     Smoking status: Never Smoker     Smokeless tobacco: Never Used   Substance Use Topics     Alcohol use: No     Family History   Problem Relation Age of Onset     Prostate Cancer Father      Coronary Artery Disease No family hx of          Current Outpatient Medications   Medication Sig Dispense Refill     amLODIPine (NORVASC) 2.5 MG tablet Take 1 tablet (2.5 mg) by mouth daily 90 tablet 1     aspirin 81 MG tablet Take 81 mg by mouth daily       baclofen (LIORESAL) 10 MG tablet Take 1 tablet (10 mg) by mouth daily 180 tablet 0     blood glucose monitoring (NO BRAND SPECIFIED) meter device kit Use to test blood sugar one times daily or as directed. 1 kit 0     carBAMazepine (TEGRETOL XR) 200 MG 12 hr tablet Take 1 tablet (200 mg) by mouth 6 times daily 450 tablet 1     clobetasol (TEMOVATE) 0.05 % ointment Apply topically as needed        Desonide Crea-Wound Dress Crea (DESONIL CREAM EX) Externally apply 0.05 oz topically as needed Taro        fexofenadine (ALLEGRA) 180 MG tablet Take 180 mg by mouth daily       finasteride (PROSCAR) 5 MG tablet Take 1 tablet (5 mg) by mouth daily 90 tablet 3     fluticasone (FLONASE) 50 MCG/ACT nasal spray Spray 1 spray into both nostrils daily as needed        furosemide (LASIX) 20 MG tablet Take 1 tablet (20 mg) by mouth daily 90 tablet 1     gabapentin (NEURONTIN) 300 MG capsule Take 1 capsule (300 mg) by mouth 4 times daily 360 capsule 1     ketoconazole (NIZORAL) 2 % external cream Apply topically daily 30 g 3     lisinopril (ZESTRIL) 40 MG tablet Take 1 tablet (40 mg) by mouth daily 90 tablet 1     Magnesium 400 MG CAPS Take by mouth 2 times daily       niacin (SLO-NIACIN) 250 MG TBCR CR tablet Take 250 mg by mouth daily        omeprazole (PRILOSEC) 40 MG DR capsule Take 1 capsule (40 mg) by mouth 2 times daily (before  "meals) 90 capsule 3     Polyethylene Glycol 3350 (MIRALAX PO) Take 1 capful by mouth Mixed in water once daily as needed.       PSYLLIUM HUSK PO Take 750 mg by mouth daily        senna-docusate (SENOKOT-S/PERICOLACE) 8.6-50 MG tablet Take 2 tablets by mouth 2 times daily as needed        sitagliptin-metFORMIN (JANUMET)  MG tablet Take 1 tablet by mouth 2 times daily (with meals) 60 tablet 11     spironolactone (ALDACTONE) 25 MG tablet Take 1 tablet (25 mg) by mouth daily 90 tablet 1     UNABLE TO FIND MEDICATION NAME: mometasone, hyaluronic acid compound from dermatologist.   Applying topically to areas of itchy skin, per patient.       VITAMIN D, CHOLECALCIFEROL, PO Take 5,000 Units by mouth 2 times daily       vitamin E 400 UNITS TABS Take 1 tablet by mouth daily as needed        zinc sulfate (ZINCATE) 220 (50 Zn) MG capsule Take 220 mg by mouth daily       Allergies   Allergen Reactions     Arthrotec      Atorvastatin      Muscle aches     Augmentin Diarrhea     Carvedilol      Patient had arthralgias, he attributed to carvedilol.     Celebrex [Celecoxib]      Stomach pain     Colestipol      Muscle pain, occurred in Jan 2011     Cozaar [Losartan]      Crestor [Rosuvastatin]      Gemfibrozil      Glucotrol [Glipizide]      Hmg-Coa-R Inhibitors      Muscle Pain     Ibuprofen      Bloody noses a couple of times on this medication.  Occurred in 12/2011     Nabumetone      Relafin - stomach pain     Oxycodone-Acetaminophen Other (See Comments)     Patient states it makes him\"loopy\"     Ozempic [Semaglutide]      trialed x several weeks, extreme nausea.     Vioxx [Rofecoxib]      Zocor [Simvastatin]      Erythromycin Rash     Ketoconazole Rash     Was to treat a rash and the rash worsened.     Sulfabenzamide Rash     Zetia [Ezetimibe] Rash     Happened in 2006     Recent Labs   Lab Test 02/04/21 01/05/21 11/04/20  1449 08/04/20 08/09/17  0736 08/09/17  0736 01/30/17  0907 08/01/16 01/25/16 03/01/13  0835 " "03/01/13  0835   A1C 6.7  --  6.9 7.2*   < >  --   --  7.9 7.3   < >  --    LDL  --   --   --   --   --  178* 184*  --  165  --   --    HDL  --   --   --   --   --  66 56  --  54  --   --    TRIG  --   --   --   --   --  185* 150*  --  184  --   --    ALT  --   --   --   --   --  26 25 18  --   --   --    CR  --  0.75  --  0.82   < >  --  0.71 0.82  --    < >  --    GFRESTIMATED  --   --   --   --   --   --  >90  Non  GFR Calc   >60  --   --   --    GFRESTBLACK  --   --   --   --   --   --  >90  African American GFR Calc    --   --   --   --    POTASSIUM  --  5.07  --  4.42   < >  --  4.5 5  --   --  4.6   TSH  --   --   --   --   --   --   --   --   --   --  1.58    < > = values in this interval not displayed.          Review of Systems  Constitutional, HEENT, cardiovascular, pulmonary, gi and gu systems are negative, except as otherwise noted.    OBJECTIVE:   There were no vitals taken for this visit. Estimated body mass index is 45.25 kg/m  as calculated from the following:    Height as of 2/4/21: 1.854 m (6' 1\").    Weight as of 2/4/21: 155.6 kg (343 lb).  Physical Exam  Gen- alert, NAD, cheerful, mentation , judgement and insight intact.  Well groomed.      Diagnostic Test Results:  Labs reviewed in Epic    ASSESSMENT / PLAN:   (Z00.00) Encounter for initial annual wellness visit (AWV) in Medicare patient  (primary encounter diagnosis)  Comment: discussed screeners  Plan: Continue to work on healthy diet and exercise, discussed healthy habits     Patient has been advised of split billing requirements and indicates understanding: Yes  COUNSELING:  Reviewed preventive health counseling, as reflected in patient instructions       Regular exercise       Healthy diet/nutrition       Vision screening       Hearing screening       Dental care       Fall risk prevention       Colon cancer screening       Prostate cancer screening    Estimated body mass index is 45.25 kg/m  as calculated from the " "following:    Height as of 2/4/21: 1.854 m (6' 1\").    Weight as of 2/4/21: 155.6 kg (343 lb).    Weight management plan: Discussed healthy diet and exercise guidelines    He reports that he has never smoked. He has never used smokeless tobacco.      Appropriate preventive services were discussed with this patient, including applicable screening as appropriate for cardiovascular disease, diabetes, osteopenia/osteoporosis, and glaucoma.  As appropriate for age/gender, discussed screening for colorectal cancer, prostate cancer, breast cancer, and cervical cancer. Checklist reviewing preventive services available has been given to the patient.    Reviewed patients plan of care and provided an AVS. The Basic Care Plan (routine screening as documented in Health Maintenance) for Jovi meets the Care Plan requirement. This Care Plan has been established and reviewed with the Patient and spouse.    Counseling Resources:  ATP IV Guidelines  Pooled Cohorts Equation Calculator  Breast Cancer Risk Calculator  Breast Cancer: Medication to Reduce Risk  FRAX Risk Assessment  ICSI Preventive Guidelines  Dietary Guidelines for Americans, 2010  [employer]'s MyPlate  ASA Prophylaxis  Lung CA Screening    Josafat Marroquin MD  [facility]    Identified Health Risks:  "

## 2021-04-15 DIAGNOSIS — G50.0 TRIGEMINAL NEURALGIA: ICD-10-CM

## 2021-04-15 DIAGNOSIS — E11.8 TYPE 2 DIABETES MELLITUS WITH COMPLICATION, WITHOUT LONG-TERM CURRENT USE OF INSULIN (H): ICD-10-CM

## 2021-04-15 RX ORDER — CARBAMAZEPINE 200 MG/1
200 TABLET, EXTENDED RELEASE ORAL
Qty: 540 TABLET | Refills: 0 | Status: SHIPPED | OUTPATIENT
Start: 2021-04-15 | End: 2021-07-06

## 2021-04-15 RX ORDER — GABAPENTIN 300 MG/1
300 CAPSULE ORAL 4 TIMES DAILY
Qty: 360 CAPSULE | Refills: 1 | Status: SHIPPED | OUTPATIENT
Start: 2021-04-15 | End: 2021-10-01

## 2021-04-15 NOTE — TELEPHONE ENCOUNTER
Jovi Aldana is requesting a refill of:    Pending Prescriptions:                       Disp   Refills    carBAMazepine (TEGRETOL XR) 200 MG 12 hr *540 ta*0            Sig: Take 1 tablet (200 mg) by mouth 6 times daily    Please close encounter if RX was sent. Thanks, Stephania

## 2021-04-15 NOTE — TELEPHONE ENCOUNTER
Jovi Aldana is requesting a refill of:    Pending Prescriptions:                       Disp   Refills    gabapentin (NEURONTIN) 300 MG capsule     360 ca*1            Sig: Take 1 capsule (300 mg) by mouth 4 times daily    Please close encounter if RX was sent. Thanks, Stephania     Must come to do labs ordered in May . PSA also added . Please link

## 2021-04-15 NOTE — TELEPHONE ENCOUNTER
This was put under historical as BK changed dose to 6 times a day from 5. The active Rx is .    Please advise

## 2021-04-19 DIAGNOSIS — K21.00 GASTROESOPHAGEAL REFLUX DISEASE WITH ESOPHAGITIS WITHOUT HEMORRHAGE: ICD-10-CM

## 2021-04-19 RX ORDER — OMEPRAZOLE 40 MG/1
40 CAPSULE, DELAYED RELEASE ORAL
Qty: 180 CAPSULE | Refills: 1 | Status: SHIPPED | OUTPATIENT
Start: 2021-04-19 | End: 2021-10-19

## 2021-04-19 NOTE — TELEPHONE ENCOUNTER
Jovi Aldana is requesting a refill of:    Pending Prescriptions:                       Disp   Refills    omeprazole (PRILOSEC) 40 MG DR capsule    180 ca*1            Sig: Take 1 capsule (40 mg) by mouth 2 times daily           (before meals)    Please close encounter if RX was sent. Thanks, Stephania

## 2021-05-13 ENCOUNTER — OFFICE VISIT (OUTPATIENT)
Dept: FAMILY MEDICINE | Facility: CLINIC | Age: 76
End: 2021-05-13

## 2021-05-13 VITALS
HEIGHT: 73 IN | WEIGHT: 315 LBS | BODY MASS INDEX: 41.75 KG/M2 | DIASTOLIC BLOOD PRESSURE: 82 MMHG | SYSTOLIC BLOOD PRESSURE: 170 MMHG | HEART RATE: 84 BPM | TEMPERATURE: 98.6 F | RESPIRATION RATE: 20 BRPM

## 2021-05-13 DIAGNOSIS — I10 ESSENTIAL HYPERTENSION: ICD-10-CM

## 2021-05-13 DIAGNOSIS — E11.8 TYPE 2 DIABETES MELLITUS WITH COMPLICATION, WITHOUT LONG-TERM CURRENT USE OF INSULIN (H): Primary | ICD-10-CM

## 2021-05-13 DIAGNOSIS — E78.2 MIXED HYPERLIPIDEMIA: ICD-10-CM

## 2021-05-13 DIAGNOSIS — K59.00 CONSTIPATION, UNSPECIFIED CONSTIPATION TYPE: ICD-10-CM

## 2021-05-13 DIAGNOSIS — R42 LIGHTHEADEDNESS: ICD-10-CM

## 2021-05-13 LAB
ALBUMIN URINE MG/G CR: ABNORMAL MG/G CREATININE
ALBUMIN URINE MG/SPEC: 10
CREATININE URINE: 50
HBA1C MFR BLD: 6.6 % (ref 4–7)

## 2021-05-13 PROCEDURE — 82043 UR ALBUMIN QUANTITATIVE: CPT | Performed by: FAMILY MEDICINE

## 2021-05-13 PROCEDURE — 36415 COLL VENOUS BLD VENIPUNCTURE: CPT | Performed by: FAMILY MEDICINE

## 2021-05-13 PROCEDURE — 99214 OFFICE O/P EST MOD 30 MIN: CPT | Performed by: FAMILY MEDICINE

## 2021-05-13 PROCEDURE — 83036 HEMOGLOBIN GLYCOSYLATED A1C: CPT | Performed by: FAMILY MEDICINE

## 2021-05-13 ASSESSMENT — MIFFLIN-ST. JEOR: SCORE: 2347.86

## 2021-05-13 NOTE — NURSING NOTE
Jovi Aldana is here for a diabetic check and medication refill.    Questioned patient about current smoking habits.  Pt. has never smoked.  Body mass index is 45.78 kg/m .  PULSE regular  My Chart: active  CLASSIFICATION OF OVERWEIGHT AND OBESITY BY BMI                        Obesity Class           BMI(kg/m2)  Underweight                                    < 18.5  Normal                                         18.5-24.9  Overweight                                     25.0-29.9  OBESITY                     I                  30.0-34.9                             II                 35.0-39.9  EXTREME OBESITY             III                >40                            Patient's  BMI Body mass index is 45.78 kg/m .  http://hin.nhlbi.nih.gov/menuplanner/menu.cgi    Pre-visit planning  Immunizations - up to date  Colonoscopy -   Mammogram -   Asthma -   PHQ9 -    RHODA-7 -    Hearing screen -

## 2021-05-13 NOTE — PROGRESS NOTES
Assessment & Plan     Type 2 diabetes mellitus with complication, without long-term current use of insulin (H)  Well controlled, continue current medications at current doses   - sitagliptin-metFORMIN (JANUMET)  MG tablet  Dispense: 180 tablet; Refill: 1  - blood glucose (NO BRAND SPECIFIED) test strip  Dispense: 100 strip; Refill: 1  - Albumin Random Urine Quantitative with Creat Ratio  - FOOT EXAM  NO CHARGE [[id number]]  - Hemoglobin A1c (BFP)  - VENOUS COLLECTION  - Other Specialty Referral    Mixed hyperlipidemia  Control uncertain, needs lipid panel but not fasting, fast neck check    Essential hypertension  Elevated on first check but always normalizes -home reading controlled    Lightheadedness  Likely multifactorial- meds, offered MTM referral but he will wait on this for now unless worsens    Constipation-using senna and metamucil-works if uses regularly, recommend he try miralax if not doing well      Review of the result(s) of each unique test - labs  Ordering of each unique test  Prescription drug management         FUTURE APPOINTMENTS:       - Follow-up visit in 3-6 mo fasting  Work on weight loss  Regular exercise    No follow-ups on file.    Josafat Marroquin MD  [facility]    Subjective   Peterson is a 76 year old who presents for the following health issues  accompanied by his spouse:    HPI     Diabetes Follow-up    How often are you checking your blood sugar? A few times a month  110-120  What time of day are you checking your blood sugars (select all that apply)?  Before meals  Have you had any blood sugars above 200?  No  Have you had any blood sugars below 70?  No    What symptoms do you notice when your blood sugar is low?  None and Not applicable    What concerns do you have today about your diabetes? None     Do you have any of these symptoms? (Select all that apply)  Numbness in feet and Burning in feet    Have you had a diabetic eye exam in the last 12  "months?                 Hyperlipidemia Klgcxw-Tb-tta not tolerated any statin      Are you regularly taking any medication or supplement to lower your cholesterol?   Yes- niacin    Are you having muscle aches or other side effects that you think could be caused by your cholesterol lowering medication?  No    Hypertension Wamied-zi-LD mainly OK at home      Do you check your blood pressure regularly outside of the clinic? Yes     Are you following a low salt diet? No    Are your blood pressures ever more than 140 on the top number (systolic) OR more   than 90 on the bottom number (diastolic), for example 140/90? Yes    BP Readings from Last 2 Encounters:   05/13/21 (!) 170/82   02/04/21 (!) 164/68     Hemoglobin A1C (%)   Date Value   02/04/2021 6.7   11/04/2020 6.9     LDL Cholesterol Calculated (mg/dL)   Date Value   08/09/2017 178 (H)   01/30/2017 184 (H)         How many servings of fruits and vegetables do you eat daily?  2-3    On average, how many sweetened beverages do you drink each day (Examples: soda, juice, sweet tea, etc.  Do NOT count diet or artificially sweetened beverages)?   1    How many days per week do you exercise enough to make your heart beat faster? 3 or less    How many minutes a day do you exercise enough to make your heart beat faster? 20 - 29 mainly knee therapy    How many days per week do you miss taking your medication? 0    Pt noting some dizziness on and off-mainly lightheaded when gets up    Review of Systems   Constitutional, HEENT, cardiovascular, pulmonary, gi and gu systems are negative, except as otherwise noted.      Objective    BP (!) 170/82 (BP Location: Left arm, Patient Position: Chair, Cuff Size: Adult Large)   Pulse 84   Temp 98.6  F (37  C)   Resp 20   Ht 1.854 m (6' 1\")   Wt (!) 157.4 kg (347 lb)   BMI 45.78 kg/m    Body mass index is 45.78 kg/m .  Physical Exam   GENERAL: alert, no distress and obese  EYES: Eyes grossly normal to inspection, PERRL and " conjunctivae and sclerae normal  HENT: ear canals and TM's normal, nose and mouth without ulcers or lesions  NECK: no adenopathy, no asymmetry, masses, or scars and thyroid normal to palpation  RESP: lungs clear to auscultation - no rales, rhonchi or wheezes  CV: regular rates and rhythm, normal S1 S2, no S3 or S4, no murmur, click or rub, peripheral pulses strong and 1+ bilateral lower extremity pitting edema to midcalf    ABDOMEN: soft, nontender, no hepatosplenomegaly, no masses and bowel sounds normal  MS: no gross musculoskeletal defects noted, no edema  NEURO: Normal strength and tone, sensory deficit feet and mentation intact  PSYCH: mentation appears normal, affect normal/bright  Diabetic foot exam: normal DP and PT pulses, no trophic changes or ulcerative lesions, reduced sensation at feet diffusely and venous stasis dermatitis noted    Results for orders placed or performed in visit on 05/13/21 (from the past 24 hour(s))   Hemoglobin A1c (BFP)   Result Value Ref Range    Hemoglobin A1C 6.6 4.0 - 7.0 %

## 2021-05-26 ENCOUNTER — TELEPHONE (OUTPATIENT)
Dept: FAMILY MEDICINE | Facility: CLINIC | Age: 76
End: 2021-05-26

## 2021-06-03 DIAGNOSIS — R60.9 EDEMA, UNSPECIFIED TYPE: ICD-10-CM

## 2021-06-03 DIAGNOSIS — B35.4 TINEA CORPORIS: ICD-10-CM

## 2021-06-03 DIAGNOSIS — I10 ESSENTIAL HYPERTENSION, BENIGN: ICD-10-CM

## 2021-06-03 DIAGNOSIS — G50.0 TRIGEMINAL NEURALGIA: ICD-10-CM

## 2021-06-03 RX ORDER — SPIRONOLACTONE 25 MG/1
25 TABLET ORAL DAILY
Qty: 90 TABLET | Refills: 1 | Status: SHIPPED | OUTPATIENT
Start: 2021-06-03 | End: 2021-12-06

## 2021-06-03 RX ORDER — BACLOFEN 10 MG/1
10 TABLET ORAL DAILY
Qty: 180 TABLET | Refills: 0 | Status: SHIPPED | OUTPATIENT
Start: 2021-06-03 | End: 2021-12-06

## 2021-06-03 RX ORDER — FUROSEMIDE 20 MG
20 TABLET ORAL DAILY
Qty: 90 TABLET | Refills: 1 | Status: SHIPPED | OUTPATIENT
Start: 2021-06-03 | End: 2021-12-06

## 2021-06-03 RX ORDER — LISINOPRIL 40 MG/1
40 TABLET ORAL DAILY
Qty: 90 TABLET | Refills: 1 | Status: SHIPPED | OUTPATIENT
Start: 2021-06-03 | End: 2021-12-06

## 2021-06-03 NOTE — TELEPHONE ENCOUNTER
Last Ov 5/13/2021    Pending Prescriptions:                       Disp   Refills    baclofen (LIORESAL) 10 MG tablet          180 ta*0            Sig: Take 1 tablet (10 mg) by mouth daily    spironolactone (ALDACTONE) 25 MG tablet   90 tab*1            Sig: Take 1 tablet (25 mg) by mouth daily    furosemide (LASIX) 20 MG tablet           90 tab*1            Sig: Take 1 tablet (20 mg) by mouth daily    lisinopril (ZESTRIL) 40 MG tablet         90 tab*1            Sig: Take 1 tablet (40 mg) by mouth daily

## 2021-06-08 RX ORDER — KETOCONAZOLE 20 MG/G
CREAM TOPICAL DAILY
Qty: 30 G | Refills: 3 | Status: SHIPPED | OUTPATIENT
Start: 2021-06-08 | End: 2022-07-27

## 2021-06-08 NOTE — TELEPHONE ENCOUNTER
Received refill request for this medication. Please advise.     Pending Prescriptions:                       Disp   Refills    ketoconazole (NIZORAL) 2 % external cream 30 g   3            Sig: Apply topically daily    Signed Prescriptions:                        Disp   Refills    baclofen (LIORESAL) 10 MG tablet           180 ta*0        Sig: Take 1 tablet (10 mg) by mouth daily  Authorizing Provider: YONIS SABA    spironolactone (ALDACTONE) 25 MG tablet    90 tab*1        Sig: Take 1 tablet (25 mg) by mouth daily  Authorizing Provider: YONIS SABA    furosemide (LASIX) 20 MG tablet            90 tab*1        Sig: Take 1 tablet (20 mg) by mouth daily  Authorizing Provider: YONIS SABA    lisinopril (ZESTRIL) 40 MG tablet          90 tab*1        Sig: Take 1 tablet (40 mg) by mouth daily  Authorizing Provider: YONIS SABA

## 2021-06-23 ENCOUNTER — TELEPHONE (OUTPATIENT)
Dept: FAMILY MEDICINE | Facility: CLINIC | Age: 76
End: 2021-06-23

## 2021-07-06 DIAGNOSIS — R31.29 MICROSCOPIC HEMATURIA: Primary | ICD-10-CM

## 2021-07-06 DIAGNOSIS — G50.0 TRIGEMINAL NEURALGIA: ICD-10-CM

## 2021-07-06 RX ORDER — CARBAMAZEPINE 200 MG/1
200 TABLET, EXTENDED RELEASE ORAL
Qty: 540 TABLET | Refills: 0 | Status: SHIPPED | OUTPATIENT
Start: 2021-07-06 | End: 2021-11-12

## 2021-07-06 NOTE — TELEPHONE ENCOUNTER
Received incoming refill request for  Pending Prescriptions:                       Disp   Refills    carBAMazepine (TEGRETOL XR) 200 MG 12 hr *540 ta*0            Sig: TAKE 1 TABLET (200 MG) BY MOUTH 6 TIMES DAILY    Patient last had a refill of this medication on 4/15/2021 for a 90 day supply. The patient was seen on 5/13/2021 for their medication check and is up to date with this. All other medications have been filled for 6 month supply. Routing to April due to Dr. Marroquin being out of office, are you willing to send this in for the patient?

## 2021-07-20 DIAGNOSIS — I10 ESSENTIAL HYPERTENSION: ICD-10-CM

## 2021-07-20 NOTE — TELEPHONE ENCOUNTER
Jvoi Aldana is requesting a refill of:    Pending Prescriptions:                       Disp   Refills    amLODIPine (NORVASC) 2.5 MG tablet        90 tab*0            Sig: Take 1 tablet (2.5 mg) by mouth daily    Please close encounter if RX was sent. Thanks, Stephania

## 2021-07-21 RX ORDER — AMLODIPINE BESYLATE 2.5 MG/1
2.5 TABLET ORAL DAILY
Qty: 90 TABLET | Refills: 0 | Status: SHIPPED | OUTPATIENT
Start: 2021-07-21 | End: 2021-11-11

## 2021-08-12 DIAGNOSIS — N40.0 BENIGN PROSTATIC HYPERPLASIA WITHOUT LOWER URINARY TRACT SYMPTOMS: ICD-10-CM

## 2021-08-12 RX ORDER — FINASTERIDE 5 MG/1
TABLET, FILM COATED ORAL
Qty: 90 TABLET | Refills: 0 | Status: SHIPPED | OUTPATIENT
Start: 2021-08-12 | End: 2021-09-16

## 2021-08-23 ENCOUNTER — OFFICE VISIT (OUTPATIENT)
Dept: FAMILY MEDICINE | Facility: CLINIC | Age: 76
End: 2021-08-23

## 2021-08-23 VITALS
RESPIRATION RATE: 20 BRPM | TEMPERATURE: 98.4 F | HEART RATE: 80 BPM | SYSTOLIC BLOOD PRESSURE: 148 MMHG | DIASTOLIC BLOOD PRESSURE: 82 MMHG | WEIGHT: 315 LBS | BODY MASS INDEX: 41.75 KG/M2 | HEIGHT: 73 IN

## 2021-08-23 DIAGNOSIS — E78.2 MIXED HYPERLIPIDEMIA: ICD-10-CM

## 2021-08-23 DIAGNOSIS — N40.1 BENIGN PROSTATIC HYPERPLASIA WITH URINARY FREQUENCY: ICD-10-CM

## 2021-08-23 DIAGNOSIS — E66.01 MORBID OBESITY (H): ICD-10-CM

## 2021-08-23 DIAGNOSIS — E11.8 TYPE 2 DIABETES MELLITUS WITH COMPLICATION, WITHOUT LONG-TERM CURRENT USE OF INSULIN (H): Primary | ICD-10-CM

## 2021-08-23 DIAGNOSIS — R35.0 BENIGN PROSTATIC HYPERPLASIA WITH URINARY FREQUENCY: ICD-10-CM

## 2021-08-23 DIAGNOSIS — G47.33 OBSTRUCTIVE SLEEP APNEA SYNDROME: ICD-10-CM

## 2021-08-23 DIAGNOSIS — I10 ESSENTIAL HYPERTENSION, BENIGN: ICD-10-CM

## 2021-08-23 LAB
ALBUMIN SERPL-MCNC: 4.3 G/DL (ref 3.6–5.1)
ALBUMIN/GLOB SERPL: 1.7 {RATIO} (ref 1–2.5)
ALP SERPL-CCNC: 74 U/L (ref 33–130)
ALT 1742-6: 16 U/L (ref 0–32)
AST 1920-8: 15 U/L (ref 0–35)
BILIRUB SERPL-MCNC: 0.5 MG/DL (ref 0.2–1.2)
BUN SERPL-MCNC: 9 MG/DL (ref 7–25)
BUN/CREATININE RATIO: 10.7 (ref 6–22)
CALCIUM SERPL-MCNC: 9.2 MG/DL (ref 8.6–10.3)
CHLORIDE SERPLBLD-SCNC: 98 MMOL/L (ref 98–110)
CHOLEST SERPL-MCNC: 248 MG/DL (ref 0–199)
CHOLEST/HDLC SERPL: 3 {RATIO} (ref 0–5)
CO2 SERPL-SCNC: 30.5 MMOL/L (ref 20–32)
CREAT SERPL-MCNC: 0.84 MG/DL (ref 0.6–1.3)
GLOBULIN, CALCULATED - QUEST: 2.6 (ref 1.9–3.7)
GLUCOSE SERPL-MCNC: 186 MG/DL (ref 60–99)
HBA1C MFR BLD: 6.5 % (ref 4–7)
HDLC SERPL-MCNC: 75 MG/DL (ref 40–150)
LDLC SERPL CALC-MCNC: 143 MG/DL (ref 0–130)
POTASSIUM SERPL-SCNC: 4.81 MMOL/L (ref 3.5–5.3)
PROT SERPL-MCNC: 6.9 G/DL (ref 6.1–8.1)
SODIUM SERPL-SCNC: 130 MMOL/L (ref 135–146)
TRIGL SERPL-MCNC: 149 MG/DL (ref 0–149)

## 2021-08-23 PROCEDURE — 80053 COMPREHEN METABOLIC PANEL: CPT | Performed by: FAMILY MEDICINE

## 2021-08-23 PROCEDURE — 36415 COLL VENOUS BLD VENIPUNCTURE: CPT | Performed by: FAMILY MEDICINE

## 2021-08-23 PROCEDURE — 80061 LIPID PANEL: CPT | Performed by: FAMILY MEDICINE

## 2021-08-23 PROCEDURE — 99214 OFFICE O/P EST MOD 30 MIN: CPT | Performed by: FAMILY MEDICINE

## 2021-08-23 PROCEDURE — 83036 HEMOGLOBIN GLYCOSYLATED A1C: CPT | Performed by: FAMILY MEDICINE

## 2021-08-23 ASSESSMENT — MIFFLIN-ST. JEOR: SCORE: 2338.79

## 2021-08-23 NOTE — NURSING NOTE
Jovi Aldana is here for a diabetic check and medication refill.    Questioned patient about current smoking habits.  Pt. has never smoked.  Body mass index is 45.52 kg/m .  PULSE regular  My Chart: active  CLASSIFICATION OF OVERWEIGHT AND OBESITY BY BMI                        Obesity Class           BMI(kg/m2)  Underweight                                    < 18.5  Normal                                         18.5-24.9  Overweight                                     25.0-29.9  OBESITY                     I                  30.0-34.9                             II                 35.0-39.9  EXTREME OBESITY             III                >40                            Patient's  BMI Body mass index is 45.52 kg/m .  http://hin.nhlbi.nih.gov/menuplanner/menu.cgi    Pre-visit planning  Immunizations - up to date  Colonoscopy - is up to date  Mammogram -   Asthma -   PHQ9 -    RHODA-7 -    Hearing screen -

## 2021-08-23 NOTE — PROGRESS NOTES
Assessment & Plan     Type 2 diabetes mellitus with complication, without long-term current use of insulin (H)  Well controlled, continue current medications at current doses   - HEMOGLOBIN A1C (BFP)  - VENOUS COLLECTION  - Comprehensive Metobolic Panel (BFP)    Mixed hyperlipidemia  Not completely fated (had cereal)-will check lipids, has not tolerated statins or zetiaContinue to work on healthy diet and exercise, discussed healthy habits   - Lipid Panel (BFP)  - Comprehensive Metobolic Panel (BFP)    Essential hypertension, benign  elevated here today but better at home, continue current medications at current doses   - Comprehensive Metobolic Panel (BFP)    Benign prostatic hyperplasia with urinary frequency  Control uncertain, seeing urology  - PSA Total ([facility])    Obstructive sleep apnea syndrome  cpap    Morbid obesity (H)  Continue to work on healthy diet and exercise, discussed healthy habits       Review of external notes as documented elsewhere in note  Review of the result(s) of each unique test - labs  Ordering of each unique test  Prescription drug management         FUTURE APPOINTMENTS:       - Follow-up visit in 6 mo  Regular exercise    No follow-ups on file.    Josafat Marroquin MD  [facility]    Subjective   Peterson is a 76 year old who presents for the following health issues  accompanied by his spouse:    HPI     Diabetes Follow-up    How often are you checking your blood sugar? One time daily  Average 117  What time of day are you checking your blood sugars (select all that apply)?  Before meals  Have you had any blood sugars above 200?  No  Have you had any blood sugars below 70?  No    What symptoms do you notice when your blood sugar is low?  None    What concerns do you have today about your diabetes? None     Do you have any of these symptoms? (Select all that apply)  Numbness in feet    Have you had a diabetic eye exam in the last 12 months?  "9/20                Hyperlipidemia Qiiywe-Ww-ksr not tolerated any statin or zetia      Are you regularly taking any medication or supplement to lower your cholesterol?   No    Are you having muscle aches or other side effects that you think could be caused by your cholesterol lowering medication?  No    Hypertension Follow-up      Do you check your blood pressure regularly outside of the clinic? Yes     Are you following a low salt diet? No    Are your blood pressures ever more than 140 on the top number (systolic) OR more   than 90 on the bottom number (diastolic), for example 140/90? Yes-some elevated but most ok    BP Readings from Last 2 Encounters:   08/23/21 (!) 148/82   05/13/21 (!) 170/82     Hemoglobin A1C (%)   Date Value   05/13/2021 6.6   02/04/2021 6.7     LDL Cholesterol Calculated (mg/dL)   Date Value   08/09/2017 178 (H)   01/30/2017 184 (H)         How many servings of fruits and vegetables do you eat daily?  2-3    On average, how many sweetened beverages do you drink each day (Examples: soda, juice, sweet tea, etc.  Do NOT count diet or artificially sweetened beverages)?   1    How many days per week do you exercise enough to make your heart beat faster? 4    How many minutes a day do you exercise enough to make your heart beat faster? 9 or less    How many days per week do you miss taking your medication? 0    Pt seeing urology again soon for BPH- not sure if meds helping- needs psa    KANIKA- using CPAP nightly    Review of Systems   Constitutional, HEENT, cardiovascular, pulmonary, gi and gu systems are negative, except as otherwise noted.      Objective    BP (!) 148/82 (BP Location: Right arm, Patient Position: Chair, Cuff Size: Adult Large)   Pulse 80   Temp 98.4  F (36.9  C)   Resp 20   Ht 1.854 m (6' 1\")   Wt (!) 156.5 kg (345 lb)   BMI 45.52 kg/m    Body mass index is 45.52 kg/m .  Physical Exam   GENERAL: healthy, alert and no distress  NECK: no adenopathy, no asymmetry, masses, or " scars and thyroid normal to palpation  RESP: lungs clear to auscultation - no rales, rhonchi or wheezes  CV: regular rate and rhythm, normal S1 S2, no S3 or S4, no murmur, click or rub, no peripheral edema and peripheral pulses strong  ABDOMEN: soft, nontender, no hepatosplenomegaly, no masses and bowel sounds normal  MS: no gross musculoskeletal defects noted, no edema  SKIN: no suspicious lesions or rashes  PSYCH: mentation appears normal, affect normal/bright    Results for orders placed or performed in visit on 08/23/21 (from the past 24 hour(s))   HEMOGLOBIN A1C (BFP)   Result Value Ref Range    Hemoglobin A1C 6.5 4.0 - 7.0 %

## 2021-08-24 LAB — ABBOTT PSA - QUEST: 0.7 NG/ML

## 2021-09-04 ENCOUNTER — HEALTH MAINTENANCE LETTER (OUTPATIENT)
Age: 76
End: 2021-09-04

## 2021-09-16 ENCOUNTER — OFFICE VISIT (OUTPATIENT)
Dept: UROLOGY | Facility: CLINIC | Age: 76
End: 2021-09-16
Payer: COMMERCIAL

## 2021-09-16 VITALS
HEIGHT: 73 IN | SYSTOLIC BLOOD PRESSURE: 142 MMHG | BODY MASS INDEX: 41.75 KG/M2 | DIASTOLIC BLOOD PRESSURE: 78 MMHG | WEIGHT: 315 LBS

## 2021-09-16 DIAGNOSIS — N48.83 ACQUIRED BURIED PENIS: ICD-10-CM

## 2021-09-16 DIAGNOSIS — N40.0 BENIGN PROSTATIC HYPERPLASIA WITHOUT LOWER URINARY TRACT SYMPTOMS: ICD-10-CM

## 2021-09-16 DIAGNOSIS — R31.29 MICROSCOPIC HEMATURIA: Primary | ICD-10-CM

## 2021-09-16 LAB
ALBUMIN UR-MCNC: NEGATIVE MG/DL
APPEARANCE UR: CLEAR
BILIRUB UR QL STRIP: NEGATIVE
COLOR UR AUTO: YELLOW
GLUCOSE UR STRIP-MCNC: 500 MG/DL
HGB UR QL STRIP: NEGATIVE
KETONES UR STRIP-MCNC: NEGATIVE MG/DL
LEUKOCYTE ESTERASE UR QL STRIP: NEGATIVE
NITRATE UR QL: NEGATIVE
PH UR STRIP: 7 [PH] (ref 5–7)
SP GR UR STRIP: 1.02 (ref 1–1.03)
UROBILINOGEN UR STRIP-ACNC: 0.2 E.U./DL

## 2021-09-16 PROCEDURE — 99213 OFFICE O/P EST LOW 20 MIN: CPT | Performed by: STUDENT IN AN ORGANIZED HEALTH CARE EDUCATION/TRAINING PROGRAM

## 2021-09-16 PROCEDURE — 81003 URINALYSIS AUTO W/O SCOPE: CPT | Mod: QW | Performed by: STUDENT IN AN ORGANIZED HEALTH CARE EDUCATION/TRAINING PROGRAM

## 2021-09-16 RX ORDER — FINASTERIDE 5 MG/1
1 TABLET, FILM COATED ORAL DAILY
Qty: 90 TABLET | Refills: 3 | Status: SHIPPED | OUTPATIENT
Start: 2021-09-16 | End: 2022-11-10

## 2021-09-16 ASSESSMENT — MIFFLIN-ST. JEOR: SCORE: 2338.79

## 2021-09-16 ASSESSMENT — PAIN SCALES - GENERAL: PAINLEVEL: MODERATE PAIN (4)

## 2021-09-16 NOTE — LETTER
"2021       RE: Jovi Aldana  [address]  [facility] [address]     Dear Colleague,    Thank you for referring your patient, Jovi Aldana, to the [facility] [place] [facility]. Please see a copy of my visit note below.    CHIEF COMPLAINT   Jovi Aldana who is a 76 year old male returns today for follow-up of BPH, microhematuria.      HPI   Jovi Aldana is a 76 year old male who presents with a history of BPH, microhematuria.  Former [facility] patient.     2021 underwent cystoscopy under anesthesia and had very large median lobe which would not pass a rigid cystoscope.    Recently had epidermal cyst removed from scrotum by a dermatologist. Still having significant discomfort from this.    Has been taking finasteride. He is concerned that his penis is buried and has difficulty delivering it from his suprapubic fat. He urinates while standing into a bucket to avoid spraying. Is on multiple antihypertensives and diuretics, has significant frequency/urgency related to this.    PSA low at 0.7 ng/ml. + FH prostate cancer, father  due to prostate cancer    PHYSICAL EXAM  Patient is a 76 year old  male   Vitals: Blood pressure (!) 142/78, height 1.854 m (6' 1\"), weight (!) 156.5 kg (345 lb).  Body mass index is 45.52 kg/m .  General Appearance Adult:   Alert, no acute distress, oriented  HENT: throat/mouth:normal, good dentition  Lungs: no respiratory distress, or pursed lip breathing  Heart: No obvious jugular venous distension present  Abdomen: morbid obesity, with large suprapubic fat pad  Musculoskeltal: extremities normal, no peripheral edema  Skin: no suspicious lesions or rashes  Neuro: Alert, oriented, speech and mentation normal  Psych: affect and mood normal  Gait: Normal  : uncircumcised, buried penis due to suprapubic fat pad, orthotopic and patent meatus  Left hemiscrotum with a healing incision after cyst " removal; some ecchymosis. Large left varicocele.  ~50 gram prostate, firm, anodular    All pertinent imaging reviewed:    CT urogram 9/3/2020    IMPRESSION:    1. No urinary tract calculi or hydronephrosis. Simple right renal  cortical cyst is present. No enhancing renal mass.  2. Mild dilatation of the left renal pelvis and blunting of the  calyces but no ureteral dilatation or evidence of obstruction.  Findings could reflect long-standing changes of UPJ stricture.  3. Mild circumferential bladder wall thickening possibly related to  chronic bladder outlet obstruction due to an enlarged prostate gland.  No focal nodularity or mass is appreciated.  4. L2 vertebral body compression fracture and apparent fusion from L4  through S1.      Mild left renal pelvis dilation.       PSA 8/23/2021 0.7 ng/ml    Component      Latest Ref Rng & Units 9/16/2021   Color Urine      Colorless, Straw, Light Yellow, Yellow Yellow   Appearance Urine      Clear Clear   Glucose Urine      Negative mg/dL 500 (A)   Bilirubin Urine      Negative Negative   Ketones Urine      Negative mg/dL Negative   Specific Gravity Urine      1.003 - 1.035 1.020   Blood Urine      Negative Negative   pH Urine      5.0 - 7.0 7.0   Protein Albumin Urine      Negative mg/dL Negative   Urobilinogen Urine      0.2, 1.0 E.U./dL 0.2   Nitrite Urine      Negative Negative   Leukocyte Esterase Urine      Negative Negative       ASSESSMENT and PLAN  76 year old male who presents with a history of BPH, microhematuria, + FH prostate cancer. Buried penis due to morbid obesity, no intervention for this besides weight loss. Scrotum healing well from epidermal cyst excision by dermatologist. No blood in urine on dip today. PSA low (1.4 adjusted, long term finasteride)    - continue finasteride  - follow up 1 year with PSA prior    Irwin Cho MD   [facility]  [facility] Phone: [phone]

## 2021-09-16 NOTE — PROGRESS NOTES
"CHIEF COMPLAINT   Jovi Aldana who is a 76 year old male returns today for follow-up of BPH, microhematuria.      HPI   Jovi Aldana is a 76 year old male who presents with a history of BPH, microhematuria.  Former [facility] patient.     2021 underwent cystoscopy under anesthesia and had very large median lobe which would not pass a rigid cystoscope.    Recently had epidermal cyst removed from scrotum by a dermatologist. Still having significant discomfort from this.    Has been taking finasteride. He is concerned that his penis is buried and has difficulty delivering it from his suprapubic fat. He urinates while standing into a bucket to avoid spraying. Is on multiple antihypertensives and diuretics, has significant frequency/urgency related to this.    PSA low at 0.7 ng/ml. + FH prostate cancer, father  due to prostate cancer    PHYSICAL EXAM  Patient is a 76 year old  male   Vitals: Blood pressure (!) 142/78, height 1.854 m (6' 1\"), weight (!) 156.5 kg (345 lb).  Body mass index is 45.52 kg/m .  General Appearance Adult:   Alert, no acute distress, oriented  HENT: throat/mouth:normal, good dentition  Lungs: no respiratory distress, or pursed lip breathing  Heart: No obvious jugular venous distension present  Abdomen: morbid obesity, with large suprapubic fat pad  Musculoskeltal: extremities normal, no peripheral edema  Skin: no suspicious lesions or rashes  Neuro: Alert, oriented, speech and mentation normal  Psych: affect and mood normal  Gait: Normal  : uncircumcised, buried penis due to suprapubic fat pad, orthotopic and patent meatus  Left hemiscrotum with a healing incision after cyst removal; some ecchymosis. Large left varicocele.  ~50 gram prostate, firm, anodular    All pertinent imaging reviewed:    CT urogram 9/3/2020    IMPRESSION:    1. No urinary tract calculi or hydronephrosis. Simple right renal  cortical cyst is present. No enhancing renal mass.  2. Mild dilatation of the left renal " pelvis and blunting of the  calyces but no ureteral dilatation or evidence of obstruction.  Findings could reflect long-standing changes of UPJ stricture.  3. Mild circumferential bladder wall thickening possibly related to  chronic bladder outlet obstruction due to an enlarged prostate gland.  No focal nodularity or mass is appreciated.  4. L2 vertebral body compression fracture and apparent fusion from L4  through S1.      Mild left renal pelvis dilation.       PSA 8/23/2021 0.7 ng/ml    Component      Latest Ref Rng & Units 9/16/2021   Color Urine      Colorless, Straw, Light Yellow, Yellow Yellow   Appearance Urine      Clear Clear   Glucose Urine      Negative mg/dL 500 (A)   Bilirubin Urine      Negative Negative   Ketones Urine      Negative mg/dL Negative   Specific Gravity Urine      1.003 - 1.035 1.020   Blood Urine      Negative Negative   pH Urine      5.0 - 7.0 7.0   Protein Albumin Urine      Negative mg/dL Negative   Urobilinogen Urine      0.2, 1.0 E.U./dL 0.2   Nitrite Urine      Negative Negative   Leukocyte Esterase Urine      Negative Negative       ASSESSMENT and PLAN  76 year old male who presents with a history of BPH, microhematuria, + FH prostate cancer. Buried penis due to morbid obesity, no intervention for this besides weight loss. Scrotum healing well from epidermal cyst excision by dermatologist. No blood in urine on dip today. PSA low (1.4 adjusted, long term finasteride)    - continue finasteride  - follow up 1 year with PSA prior    Irwin Cho MD   [facility]  [facility] Phone: [phone]

## 2021-09-23 ENCOUNTER — TRANSFERRED RECORDS (OUTPATIENT)
Dept: FAMILY MEDICINE | Facility: CLINIC | Age: 76
End: 2021-09-23

## 2021-09-30 DIAGNOSIS — E11.8 TYPE 2 DIABETES MELLITUS WITH COMPLICATION, WITHOUT LONG-TERM CURRENT USE OF INSULIN (H): ICD-10-CM

## 2021-10-01 RX ORDER — GABAPENTIN 300 MG/1
CAPSULE ORAL
Qty: 360 CAPSULE | Refills: 1 | Status: SHIPPED | OUTPATIENT
Start: 2021-10-01 | End: 2021-12-29

## 2021-10-13 ENCOUNTER — TRANSFERRED RECORDS (OUTPATIENT)
Dept: FAMILY MEDICINE | Facility: CLINIC | Age: 76
End: 2021-10-13

## 2021-10-18 ENCOUNTER — OFFICE VISIT (OUTPATIENT)
Dept: FAMILY MEDICINE | Facility: CLINIC | Age: 76
End: 2021-10-18

## 2021-10-18 ENCOUNTER — TELEPHONE (OUTPATIENT)
Dept: FAMILY MEDICINE | Facility: CLINIC | Age: 76
End: 2021-10-18

## 2021-10-18 VITALS
WEIGHT: 315 LBS | HEART RATE: 95 BPM | TEMPERATURE: 97.8 F | DIASTOLIC BLOOD PRESSURE: 82 MMHG | SYSTOLIC BLOOD PRESSURE: 160 MMHG | OXYGEN SATURATION: 95 % | RESPIRATION RATE: 22 BRPM | BODY MASS INDEX: 45.52 KG/M2

## 2021-10-18 DIAGNOSIS — I10 PRIMARY HYPERTENSION: ICD-10-CM

## 2021-10-18 DIAGNOSIS — D11.9 WARTHIN'S TUMOR: ICD-10-CM

## 2021-10-18 DIAGNOSIS — R22.0 CHEEK MASS: ICD-10-CM

## 2021-10-18 DIAGNOSIS — Z85.828 HISTORY OF BASAL CELL CARCINOMA: ICD-10-CM

## 2021-10-18 DIAGNOSIS — J34.89 SINUS PRESSURE: Primary | ICD-10-CM

## 2021-10-18 LAB
% GRANULOCYTES: 64 %
HCT VFR BLD AUTO: 42.2 % (ref 40–53)
HEMOGLOBIN: 14.1 G/DL (ref 13.3–17.7)
LYMPHOCYTES NFR BLD AUTO: 23 %
MCH RBC QN AUTO: 32.4 PG (ref 26–33)
MCHC RBC AUTO-ENTMCNC: 33.4 G/DL (ref 31–36)
MCV RBC AUTO: 97 FL (ref 78–100)
MONOCYTES NFR BLD AUTO: 13 %
PLATELET COUNT - QUEST: 242 10^9/L (ref 150–375)
RBC # BLD AUTO: 4.35 10*12/L (ref 4.4–5.9)
WBC # BLD AUTO: 8.2 10*9/L (ref 4–11)

## 2021-10-18 PROCEDURE — 85025 COMPLETE CBC W/AUTO DIFF WBC: CPT | Performed by: FAMILY MEDICINE

## 2021-10-18 PROCEDURE — 99214 OFFICE O/P EST MOD 30 MIN: CPT | Performed by: FAMILY MEDICINE

## 2021-10-18 PROCEDURE — 36415 COLL VENOUS BLD VENIPUNCTURE: CPT | Performed by: FAMILY MEDICINE

## 2021-10-18 NOTE — PATIENT INSTRUCTIONS
"Assessment & Plan   Problem List Items Addressed This Visit        Circulatory    Primary hypertension      Other Visit Diagnoses     Sinus pressure    -  Primary    Relevant Orders    Otolaryngology Referral    Cheek mass        Relevant Orders    HEMOGRAM PLATELET DIFF (BFP) (Completed)    VENOUS COLLECTION (Completed)    Otolaryngology Referral    Warthin's tumor        Relevant Orders    Otolaryngology Referral    History of basal cell carcinoma        Relevant Orders    Otolaryngology Referral           1. Sinus pressure    - Otolaryngology Referral; Future    2. Primary hypertension  He will watch at home. Recently fell.    3. Cheek mass  This is improving. Cbc is ok. Referral to see ent this week.  - HEMOGRAM PLATELET DIFF (BFP)  - VENOUS COLLECTION  - Otolaryngology Referral; Future    4. Warthin's tumor    - Otolaryngology Referral; Future    5. History of basal cell carcinoma    - Otolaryngology Referral; Future         BMI:   Estimated body mass index is 45.52 kg/m  as calculated from the following:    Height as of 9/16/21: 1.854 m (6' 1\").    Weight as of this encounter: 156.5 kg (345 lb).         FUTURE APPOINTMENTS:       - Follow-up visit with ent this week.    No follow-ups on file.    Luisa Darnell MD  [facility]  "

## 2021-10-18 NOTE — PROGRESS NOTES
"Assessment & Plan   Problem List Items Addressed This Visit        Circulatory    Primary hypertension      Other Visit Diagnoses     Sinus pressure    -  Primary    Relevant Orders    Otolaryngology Referral    Cheek mass        Relevant Orders    HEMOGRAM PLATELET DIFF (BFP) (Completed)    VENOUS COLLECTION (Completed)    Otolaryngology Referral    Warthin's tumor        Relevant Orders    Otolaryngology Referral    History of basal cell carcinoma        Relevant Orders    Otolaryngology Referral           1. Sinus pressure    - Otolaryngology Referral; Future    2. Primary hypertension  He will watch at home. Recently fell.    3. Cheek mass  This is improving. Cbc is ok. Referral to see ent this week.  We were able to get him in to see ENT tomorrow.  - HEMOGRAM PLATELET DIFF (BFP)  - VENOUS COLLECTION  - Otolaryngology Referral; Future    4. Warthin's tumor    - Otolaryngology Referral; Future    5. History of basal cell carcinoma    - Otolaryngology Referral; Future         BMI:   Estimated body mass index is 45.52 kg/m  as calculated from the following:    Height as of 9/16/21: 1.854 m (6' 1\").    Weight as of this encounter: 156.5 kg (345 lb).         FUTURE APPOINTMENTS:       - Follow-up visit with ent this week.    No follow-ups on file.    Luisa Darnell MD  [facility]    Subjective     Nursing Notes:   Minnie Diaz CMA  10/18/2021  2:13 PM  Signed  Chief Complaint   Patient presents with     Pain     pain on right side of face in sinus area, there is a hard lump in that area, was out in the cold and that makes his sinus area very sensitive, has been around for 5-6 days, it is starting to become better      Pre-visit Screening:  Immunizations:  up to date  Colonoscopy:  is up to date  Mammogram: NA  Asthma Action Test/Plan:  NA  PHQ9:  NA  GAD7:  NA  Questioned patient about current smoking habits Pt. has never smoked.  Ok to leave detailed message on voice mail for today's visit " only Yes, phone # [phone]            Jovi Aldana is a 76 year old male who presents to clinic today for the following health issues   HPI     Has not been vaccinated for covid.  No cough no fever, no breathing difficulties. No known covid exposure.   Has had a lump in the right cheek since last week wednesday and it's getting a little better.    Here with wife. Is sensitive to cold breezes.  Last week when got back in had some discomfort in the sinuses, sore and swollen in right upper  Cheek. Using heat packs, no fevers. Has a lump on right cheek. Also sore in the area in front of the ear. No pain with swallowing. Hurt with chewing until today, today was able to eat normally.         Review of Systems   Constitutional, HEENT, cardiovascular, pulmonary, gi and gu systems are negative, except as otherwise noted.      Objective    BP (!) 160/82 (BP Location: Right arm, Patient Position: Sitting, Cuff Size: Adult Large)   Pulse 95   Temp 97.8  F (36.6  C) (Temporal)   Resp 22   Wt (!) 156.5 kg (345 lb)   SpO2 95%   BMI 45.52 kg/m    Body mass index is 45.52 kg/m .  Physical Exam   GENERAL: healthy, alert and no distress  RESP: lungs clear to auscultation - no rales, rhonchi or wheezes  CV: regular rate and rhythm, normal S1 S2, no S3 or S4, no murmur, click or rub, no peripheral edema and peripheral pulses strong  MS: no gross musculoskeletal defects noted, no edema  NEURO: Normal strength and tone, mentation intact and speech normal  PSYCH: mentation appears normal, affect normal/bright  Mass right mid cheek, firm, no redness or tenderness.    Results for orders placed or performed in visit on 10/18/21   HEMOGRAM PLATELET DIFF (BFP)     Status: Abnormal   Result Value Ref Range    WBC 8.2 4.0 - 11 10*9/L    RBC Count 4.35 (A) 4.4 - 5.9 10*12/L    Hemoglobin 14.1 13.3 - 17.7 g/dL    Hematocrit 42.2 40.0 - 53.0 %    MCV 97.0 78 - 100 fL    MCH 32.4 26 - 33 pg    MCHC 33.4 31 - 36 g/dL    Platelet Count  242 150 - 375 10^9/L    % Granulocytes 64.0 %    % Lymphocytes 23.0 %    % Monocytes 13.0 %

## 2021-10-18 NOTE — NURSING NOTE
Chief Complaint   Patient presents with     Pain     pain on right side of face in sinus area, there is a hard lump in that area, was out in the cold and that makes his sinus area very sensitive, has been around for 5-6 days, it is starting to become better      Pre-visit Screening:  Immunizations:  up to date  Colonoscopy:  is up to date  Mammogram: DELICIA  Asthma Action Test/Plan:  NA  PHQ9:  NA  GAD7:  NA  Questioned patient about current smoking habits Pt. has never smoked.  Ok to leave detailed message on voice mail for today's visit only Yes, phone # [phone]

## 2021-10-18 NOTE — TELEPHONE ENCOUNTER
Patient's wife called and stated the patient is having pain in his sinuses, that it is tender to the touch, and he is having congestion. I called her back stating it sounds like he would benefit from an appointment and they were in agreement with this.

## 2021-10-21 ENCOUNTER — TRANSFERRED RECORDS (OUTPATIENT)
Dept: FAMILY MEDICINE | Facility: CLINIC | Age: 76
End: 2021-10-21

## 2021-11-11 DIAGNOSIS — G50.0 TRIGEMINAL NEURALGIA: ICD-10-CM

## 2021-11-11 DIAGNOSIS — I10 ESSENTIAL HYPERTENSION: ICD-10-CM

## 2021-11-11 RX ORDER — AMLODIPINE BESYLATE 2.5 MG/1
TABLET ORAL
Qty: 30 TABLET | Refills: 0 | Status: SHIPPED | OUTPATIENT
Start: 2021-11-11 | End: 2021-12-06

## 2021-11-11 NOTE — TELEPHONE ENCOUNTER
Jovi Aldana is requesting a refill of:    Pending Prescriptions:                       Disp   Refills    amLODIPine (NORVASC) 2.5 MG tablet [Pharm*90 tab*0            Sig: TAKE 1 TABLET BY MOUTH EVERY DAY    Pt last seen 8/23/21

## 2021-11-12 RX ORDER — CARBAMAZEPINE 200 MG/1
200 TABLET, EXTENDED RELEASE ORAL
Qty: 180 TABLET | Refills: 0 | Status: SHIPPED | OUTPATIENT
Start: 2021-11-12 | End: 2021-12-08

## 2021-11-12 NOTE — TELEPHONE ENCOUNTER
Pt is requesting a refill for carbamazepine. Takes 6 times daily. Ok for a 30 day?  Last filled on 7/6/2021 for a 90 day supply.        Pending Prescriptions:                       Disp   Refills    carBAMazepine (TEGRETOL XR) 200 MG 12 hr *180 ta*0            Sig: Take 1 tablet (200 mg) by mouth 6 times daily    Signed Prescriptions:                        Disp   Refills    amLODIPine (NORVASC) 2.5 MG tablet         30 tab*0        Sig: TAKE 1 TABLET BY MOUTH EVERY DAY  Authorizing Provider: LESLEY MEDINA

## 2021-11-12 NOTE — TELEPHONE ENCOUNTER
BP too high at last OV  30 days sent  Needs appt to recheck BP with Dr. Marroquin please    Debbie Sanders PA-C  11/11/2021

## 2021-11-29 ENCOUNTER — TRANSFERRED RECORDS (OUTPATIENT)
Dept: FAMILY MEDICINE | Facility: CLINIC | Age: 76
End: 2021-11-29

## 2021-12-04 DIAGNOSIS — I10 ESSENTIAL HYPERTENSION: ICD-10-CM

## 2021-12-06 ENCOUNTER — OFFICE VISIT (OUTPATIENT)
Dept: FAMILY MEDICINE | Facility: CLINIC | Age: 76
End: 2021-12-06

## 2021-12-06 VITALS
BODY MASS INDEX: 45.91 KG/M2 | WEIGHT: 315 LBS | DIASTOLIC BLOOD PRESSURE: 72 MMHG | SYSTOLIC BLOOD PRESSURE: 164 MMHG | RESPIRATION RATE: 20 BRPM | HEART RATE: 68 BPM | TEMPERATURE: 97.7 F

## 2021-12-06 DIAGNOSIS — I10 ESSENTIAL HYPERTENSION: ICD-10-CM

## 2021-12-06 DIAGNOSIS — E11.8 TYPE 2 DIABETES MELLITUS WITH COMPLICATION, WITHOUT LONG-TERM CURRENT USE OF INSULIN (H): Primary | ICD-10-CM

## 2021-12-06 DIAGNOSIS — Z23 NEED FOR INFLUENZA VACCINATION: ICD-10-CM

## 2021-12-06 DIAGNOSIS — G62.9 PERIPHERAL POLYNEUROPATHY: ICD-10-CM

## 2021-12-06 DIAGNOSIS — G50.0 TRIGEMINAL NEURALGIA: ICD-10-CM

## 2021-12-06 DIAGNOSIS — R22.0 CHEEK MASS: ICD-10-CM

## 2021-12-06 DIAGNOSIS — E78.2 MIXED HYPERLIPIDEMIA: ICD-10-CM

## 2021-12-06 DIAGNOSIS — E66.01 MORBID OBESITY (H): ICD-10-CM

## 2021-12-06 DIAGNOSIS — R60.9 EDEMA, UNSPECIFIED TYPE: ICD-10-CM

## 2021-12-06 DIAGNOSIS — K21.00 GASTROESOPHAGEAL REFLUX DISEASE WITH ESOPHAGITIS WITHOUT HEMORRHAGE: ICD-10-CM

## 2021-12-06 LAB — HBA1C MFR BLD: 6.4 % (ref 4–7)

## 2021-12-06 PROCEDURE — 36415 COLL VENOUS BLD VENIPUNCTURE: CPT | Performed by: FAMILY MEDICINE

## 2021-12-06 PROCEDURE — 99214 OFFICE O/P EST MOD 30 MIN: CPT | Mod: 25 | Performed by: FAMILY MEDICINE

## 2021-12-06 PROCEDURE — G0008 ADMIN INFLUENZA VIRUS VAC: HCPCS | Performed by: FAMILY MEDICINE

## 2021-12-06 PROCEDURE — 83036 HEMOGLOBIN GLYCOSYLATED A1C: CPT | Performed by: FAMILY MEDICINE

## 2021-12-06 PROCEDURE — 90662 IIV NO PRSV INCREASED AG IM: CPT | Performed by: FAMILY MEDICINE

## 2021-12-06 RX ORDER — LISINOPRIL 40 MG/1
40 TABLET ORAL DAILY
Qty: 90 TABLET | Refills: 1 | Status: SHIPPED | OUTPATIENT
Start: 2021-12-06 | End: 2022-05-13

## 2021-12-06 RX ORDER — AMLODIPINE BESYLATE 2.5 MG/1
TABLET ORAL
Qty: 30 TABLET | Refills: 0 | COMMUNITY
Start: 2021-12-06

## 2021-12-06 RX ORDER — BACLOFEN 10 MG/1
10 TABLET ORAL DAILY
Qty: 180 TABLET | Refills: 1 | Status: SHIPPED | OUTPATIENT
Start: 2021-12-06 | End: 2022-06-16

## 2021-12-06 RX ORDER — AMLODIPINE BESYLATE 2.5 MG/1
2.5 TABLET ORAL DAILY
Qty: 90 TABLET | Refills: 1 | Status: SHIPPED | OUTPATIENT
Start: 2021-12-06 | End: 2022-05-13

## 2021-12-06 RX ORDER — METOPROLOL SUCCINATE 25 MG/1
25 TABLET, EXTENDED RELEASE ORAL DAILY
Qty: 30 TABLET | Refills: 5 | Status: SHIPPED | OUTPATIENT
Start: 2021-12-06 | End: 2022-01-26 | Stop reason: SINTOL

## 2021-12-06 RX ORDER — FUROSEMIDE 20 MG
20 TABLET ORAL DAILY
Qty: 90 TABLET | Refills: 1 | Status: SHIPPED | OUTPATIENT
Start: 2021-12-06 | End: 2022-05-13

## 2021-12-06 RX ORDER — OMEPRAZOLE 40 MG/1
40 CAPSULE, DELAYED RELEASE ORAL
Qty: 180 CAPSULE | Refills: 1 | Status: SHIPPED | OUTPATIENT
Start: 2021-12-06 | End: 2022-08-05

## 2021-12-06 RX ORDER — SPIRONOLACTONE 25 MG/1
25 TABLET ORAL DAILY
Qty: 90 TABLET | Refills: 1 | Status: SHIPPED | OUTPATIENT
Start: 2021-12-06 | End: 2022-05-13

## 2021-12-06 NOTE — TELEPHONE ENCOUNTER
Jovi Aldana is requesting a refill of:    Refused Prescriptions:                       Disp   Refills    amLODIPine (NORVASC) 2.5 MG tablet [Pharma*30 tab*0        Sig: TAKE 1 TABLET BY MOUTH EVERY DAY  Refused By: FREDA ELLIOTT  Reason for Refusal: Duplicate

## 2021-12-06 NOTE — NURSING NOTE
Jovi Aldana is here for a diabetic check and medication refill.    Questioned patient about current smoking habits.  Pt. has never smoked.  Body mass index is 45.91 kg/m .  PULSE regular  My Chart: active  CLASSIFICATION OF OVERWEIGHT AND OBESITY BY BMI                        Obesity Class           BMI(kg/m2)  Underweight                                    < 18.5  Normal                                         18.5-24.9  Overweight                                     25.0-29.9  OBESITY                     I                  30.0-34.9                             II                 35.0-39.9  EXTREME OBESITY             III                >40                            Patient's  BMI Body mass index is 45.91 kg/m .  http://hin.nhlbi.nih.gov/menuplanner/menu.cgi    Pre-visit planning  Immunizations - up to date  Colonoscopy -   Mammogram -   Asthma -   PHQ9 -    RHODA-7 -    Hearing screen -

## 2021-12-06 NOTE — PROGRESS NOTES
"  Assessment & Plan     Type 2 diabetes mellitus with complication, without long-term current use of insulin (H)  Well controlled, continue current medications at current doses     If yeast issues persist may need to consider stopping Januvia- would have pt discuss with MTM  - HEMOGLOBIN A1C (BFP)  - VENOUS COLLECTION    Mixed hyperlipidemia  Control uncertain, not on statin,Continue to work on healthy diet and exercise, discussed healthy habits     Essential hypertension  Elevated again today, discussed options, will add low dose Toprol, pt has carvedilol allergy listed-myalgias but pot now feels this is unrelated-I reviewed the risks, benefits, and possible side effects of the medication.  The patient had an opportunity to ask any questions regarding the treatment plan. The patient was encouraged to call my office if any problems. Check blood pressure readings outside of the clinic several times per week, write down values, and follow up if elevated within the next several weeks. Blood pressure can be checked at the firestation, drugstore,  or any valid site.     Trigeminal neuralgia  Stable, continue current medications at current doses     Edema, unspecified type  Stable, continue current medications at current doses     Gastroesophageal reflux disease with esophagitis without hemorrhage  Well controlled, continue current medications at current doses     Need for influenza vaccination      Cheek mass  Resolved after abx    Morbid obesity (H)      Peripheral polyneuropathy  Stable, .continue current medications at current doses       Review of external notes as documented elsewhere in note  Review of the result(s) of each unique test - labs  Ordering of each unique test  Prescription drug management         BMI:   Estimated body mass index is 45.91 kg/m  as calculated from the following:    Height as of 9/16/21: 1.854 m (6' 1\").    Weight as of this encounter: 157.9 kg (348 lb).   Weight management plan: " Discussed healthy diet and exercise guidelines    FUTURE APPOINTMENTS:       - Follow-up visit in 6 mo  Work on weight loss  Regular exercise    No follow-ups on file.    Josafat Marroquin MD  [facility]    Subjective   Peterson is a 76 year old who presents for the following health issues  accompanied by his spouse.    HPI     Diabetes Follow-up    How often are you checking your blood sugar? A few times a month  What time of day are you checking your blood sugars (select all that apply)?  Before meals  Have you had any blood sugars above 200?  No  Have you had any blood sugars below 70?  No    What symptoms do you notice when your blood sugar is low?  Shaky    What concerns do you have today about your diabetes? None     Do you have any of these symptoms? (Select all that apply)  Numbness in feet and Burning in feet        Hyperlipidemia Follow-Up      Are you regularly taking any medication or supplement to lower your cholesterol?   No    Are you having muscle aches or other side effects that you think could be caused by your cholesterol lowering medication?  No    Hypertension Follow-up, in       Do you check your blood pressure regularly outside of the clinic? yes    Are you following a low salt diet? No    Are your blood pressures ever more than 140 on the top number (systolic) OR more   than 90 on the bottom number (diastolic), for example 140/90? Yes    BP Readings from Last 2 Encounters:   12/06/21 (!) 164/72   10/18/21 (!) 160/82     Hemoglobin A1C (%)   Date Value   12/06/2021 6.4   08/23/2021 6.5     LDL Cholesterol Calculated (mg/dL)   Date Value   08/09/2017 178 (H)   01/30/2017 184 (H)     LDL Cholesterol Direct (mg/dL)   Date Value   08/23/2021 143 (A)       Pt cheek issues resolved, reviewed ENT notes    Pt neuropathy stable- using meds as listed, no med side effects         How many servings of fruits and vegetables do you eat daily?  2-3    On average, how many sweetened  beverages do you drink each day (Examples: soda, juice, sweet tea, etc.  Do NOT count diet or artificially sweetened beverages)?   1    How many days per week do you exercise enough to make your heart beat faster? 3 or less    How many minutes a day do you exercise enough to make your heart beat faster? 9 or less    How many days per week do you miss taking your medication? 0        Review of Systems   Constitutional, HEENT, cardiovascular, pulmonary, gi and gu systems are negative, except as otherwise noted.      Objective    BP (!) 164/72 (BP Location: Left arm, Patient Position: Chair, Cuff Size: Adult Large)   Pulse 68   Temp 97.7  F (36.5  C)   Resp 20   Wt (!) 157.9 kg (348 lb)   BMI 45.91 kg/m    Body mass index is 45.91 kg/m .  Physical Exam   GENERAL: healthy, alert and no distress  EYES: Eyes grossly normal to inspection, PERRL and conjunctivae and sclerae normal  HENT: ear canals and TM's normal, nose and mouth without ulcers or lesions  NECK: no adenopathy, no asymmetry, masses, or scars and thyroid normal to palpation  RESP: lungs clear to auscultation - no rales, rhonchi or wheezes  CV: regular rate and rhythm, normal S1 S2, no S3 or S4, no murmur, click or rub, no peripheral edema and peripheral pulses strong  ABDOMEN: soft, nontender, no hepatosplenomegaly, no masses and bowel sounds normal  MS: no gross musculoskeletal defects noted, no edema  PSYCH: mentation appears normal, affect normal/bright    Results for orders placed or performed in visit on 12/06/21 (from the past 24 hour(s))   HEMOGLOBIN A1C (BFP)   Result Value Ref Range    Hemoglobin A1C 6.4 4.0 - 7.0 %

## 2021-12-08 DIAGNOSIS — G50.0 TRIGEMINAL NEURALGIA: ICD-10-CM

## 2021-12-08 RX ORDER — CARBAMAZEPINE 200 MG/1
200 TABLET, EXTENDED RELEASE ORAL
Qty: 540 TABLET | Refills: 0 | Status: SHIPPED | OUTPATIENT
Start: 2021-12-08 | End: 2022-02-27

## 2021-12-29 DIAGNOSIS — E11.8 TYPE 2 DIABETES MELLITUS WITH COMPLICATION, WITHOUT LONG-TERM CURRENT USE OF INSULIN (H): ICD-10-CM

## 2021-12-29 RX ORDER — GABAPENTIN 300 MG/1
CAPSULE ORAL
Qty: 360 CAPSULE | Refills: 1 | Status: SHIPPED | OUTPATIENT
Start: 2021-12-29 | End: 2022-10-13

## 2021-12-29 NOTE — TELEPHONE ENCOUNTER
Jovi Aldana is requesting a refill of:    Pending Prescriptions:                       Disp   Refills    gabapentin (NEURONTIN) 300 MG capsule [Ph*360 ca*1            Sig: TAKE 1 CAPSULE BY MOUTH 4 TIMES DAILY.    Please close encounter if RX was sent. Thanks, Stephania

## 2021-12-31 DIAGNOSIS — I10 ESSENTIAL HYPERTENSION: ICD-10-CM

## 2022-01-04 RX ORDER — METOPROLOL SUCCINATE 25 MG/1
TABLET, EXTENDED RELEASE ORAL
Qty: 30 TABLET | Refills: 5 | COMMUNITY
Start: 2022-01-04

## 2022-01-04 NOTE — TELEPHONE ENCOUNTER
Qty 30 with 5 refills e-scribed on 12/6/2021. Pt should have 5 additional refills left. Therefore I denied this request.      Refused Prescriptions:                       Disp   Refills    metoprolol succinate ER (TOPROL-XL) 25 MG *30 tab*5        Sig: TAKE 1 TABLET BY MOUTH EVERY DAY

## 2022-01-26 ENCOUNTER — OFFICE VISIT (OUTPATIENT)
Dept: FAMILY MEDICINE | Facility: CLINIC | Age: 77
End: 2022-01-26

## 2022-01-26 VITALS
RESPIRATION RATE: 20 BRPM | BODY MASS INDEX: 41.75 KG/M2 | WEIGHT: 315 LBS | HEART RATE: 80 BPM | TEMPERATURE: 97.2 F | SYSTOLIC BLOOD PRESSURE: 166 MMHG | HEIGHT: 73 IN | DIASTOLIC BLOOD PRESSURE: 74 MMHG

## 2022-01-26 DIAGNOSIS — I10 ESSENTIAL HYPERTENSION: ICD-10-CM

## 2022-01-26 DIAGNOSIS — Z01.818 PREOPERATIVE EXAMINATION: Primary | ICD-10-CM

## 2022-01-26 DIAGNOSIS — H25.9 AGE-RELATED CATARACT OF BOTH EYES, UNSPECIFIED AGE-RELATED CATARACT TYPE: ICD-10-CM

## 2022-01-26 DIAGNOSIS — E11.8 TYPE 2 DIABETES MELLITUS WITH COMPLICATION, WITHOUT LONG-TERM CURRENT USE OF INSULIN (H): ICD-10-CM

## 2022-01-26 PROCEDURE — 99214 OFFICE O/P EST MOD 30 MIN: CPT | Performed by: FAMILY MEDICINE

## 2022-01-26 RX ORDER — PREDNISOLONE ACETATE 10 MG/ML
SUSPENSION/ DROPS OPHTHALMIC
COMMUNITY
Start: 2021-12-13 | End: 2022-09-02

## 2022-01-26 RX ORDER — BROMFENAC SODIUM 0.7 MG/ML
SOLUTION/ DROPS OPHTHALMIC
COMMUNITY
Start: 2021-12-14 | End: 2022-09-02

## 2022-01-26 RX ORDER — GATIFLOXACIN 5 MG/ML
SOLUTION/ DROPS OPHTHALMIC
COMMUNITY
Start: 2021-12-13 | End: 2022-09-02

## 2022-01-26 ASSESSMENT — MIFFLIN-ST. JEOR: SCORE: 2356.93

## 2022-01-26 NOTE — LETTER
[facility]  [address] [place] [address]  [address]  [facility] [address]  Phone: [phone]  Fax: [phone]  Primary Provider: Yonis Marroquin  Pre-op Performing Provider: YONIS MARRQOUIN      PREOPERATIVE EVALUATION:  Today's date: 1/26/2022  Regan Aldana is a 76 year old male who presents for a preoperative evaluation.    Surgical Information:  Surgery/Procedure: cataract right eye, 2 weeks left  Surgery Location: [facility]  Surgeon: Dr Guzman  Surgery Date: 2/3/22  Time of Surgery: am  Where patient plans to recover: At home with family  Fax number for surgical facility: [phone]  Type of Anesthesia Anticipated: to be determined    Assessment & Plan     The proposed surgical procedure is considered LOW risk.    Preoperative examination      Age-related cataract of both eyes, unspecified age-related cataract type      Type 2 diabetes mellitus with complication, without long-term current use of insulin (H)  controlled    Essential hypertension  Elevated today, will have pt increase amlodipine to 5 mg daily to help BP, if increased edema he will let me know          Risks and Recommendations:  The patient has the following additional risks and recommendations for perioperative complications:   - Morbid obesity (BMI >40)  Obstructive Sleep Apnea:   cpap    Medication Instructions:  Patient is to take all scheduled medications on the day of surgery EXCEPT for modifications listed below:   - aspirin: Discontinue aspirin 7-10 days prior to procedure to reduce bleeding risk. It should be resumed postoperatively.    - metformin: HOLD day of surgery.    RECOMMENDATION:  APPROVAL GIVEN to proceed with proposed procedure, without further diagnostic evaluation.    Review of external notes as documented above   Review of the result(s) of each unique test - previous labs                  Subjective     1. Yes - Have you ever had a heart attack or stroke? Basilar artery CVA,  causes slight visual file deficit only  2. No - Have you ever had surgery on your heart or blood vessels, such as a stent, coronary (heart) bypass, or surgery on an artery in the head, neck, heart, or legs?  3. No - Do you have chest pain when you are physically active?  4. No - Do you have a history of heart failure?  5. No - Do you currently have a cold, bronchitis, or symptoms of other respiratory (head and chest) infections?  6. No - Do you have a cough, shortness of breath, or wheezing?  7. No - Do you or anyone in your family have a history of blood clots?  8. No - Do you or anyone in your family have a serious bleeding problem, such as long-lasting bleeding after surgeries or cuts?  9. No - Have you ever had anemia or been told to take iron pills?  10. No - Have you had any abnormal blood loss such as black, tarry or bloody stools, or abnormal vaginal bleeding?  11. No - Have you ever had a blood transfusion?  12. Yes - Are you willing to have a blood transfusion if it is medically needed before, during, or after your surgery?  13. No - Have you or anyone in your family ever had problems with anesthesia (sedation for surgery)?  14. Yes - Do you have sleep apnea, excessive snoring, or daytime drowsiness? yes Do you have a CPAP machine? CPAP  15. No - Do you have any artifical heart valves or other implanted medical devices, such as a pacemaker, defibrillator, or continuous glucose monitor?  16. No - Do you have any artifical joints?  17. No - Are you allergic to latex?  18. No - Is there any chance that you may be pregnant?      Health Care Directive:  Patient has a Health Care Directive on file      Preoperative Review of :   reviewed - no record of controlled substances prescribed.      Status of Chronic Conditions:  See problem list for active medical problems.  Problems all longstanding and stable, except as noted/documented.  See ROS for pertinent symptoms related to these conditions.    DIABETES -  Patient has a longstanding history of DiabetesType Type II . Patient is being treated with diet and oral agents and denies significant side effects. Control has been good. Complicating factors include but are not limited to: hypertension, hyperlipidemia, neuropathy and CVA.     HYPERTENSION - Patient has longstanding history of HTN , currently denies any symptoms referable to elevated blood pressure. Specifically denies chest pain, palpitations, dyspnea, orthopnea, PND or peripheral edema. Blood pressure readings have not been in normal range. Current medication regimen is as listed below. Patient denies any side effects of medication.       Review of Systems  CONSTITUTIONAL: NEGATIVE for fever, chills, change in weight  ENT/MOUTH: NEGATIVE for ear, mouth and throat problems  RESP: NEGATIVE for significant cough or SOB  CV: NEGATIVE for chest pain, palpitations or peripheral edema    Patient Active Problem List    Diagnosis Date Noted     Cholecystitis 07/24/2020     Priority: Medium     Added automatically from request for surgery [id number]       Morbid obesity (H) 05/05/2020     Priority: Medium     Supraventricular tachycardia (H) 05/05/2020     Priority: Medium     Myofascial pain 09/13/2017     Priority: Medium     Cerebrovascular accident (CVA) due to occlusion of basilar artery (H) 02/15/2017     Priority: Medium     Primary hypertension      Priority: Medium     Type 2 diabetes mellitus with complication, without long-term current use of insulin (H)      Priority: Medium     Arthritis      Priority: Medium     Sleep apnea      Priority: Medium     CPAP       Hyperlipidemia      Priority: Medium     Venous insufficiency      Priority: Medium     Coronary artery disease      Priority: Medium     Adrenal mass (H)      Priority: Medium     Multiple lung nodules 08/01/2016     Priority: Medium     Chest pain 08/19/2015     Priority: Medium     Trigeminal neuralgia 06/12/2013     Priority: Medium     Fibromyositis  "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/2013     Priority: Medium     Neck pain 02/07/2013     Priority: Medium     Arthralgia of temporomandibular joint 02/07/2013     Priority: Medium     Basal cell carcinoma of lip 10/24/2012     Priority: Medium     Hyperlipidemia type II 04/14/2010     Priority: Medium     Kidney stones 04/14/2010     Priority: Medium     Health Care Home 05/05/2020     Priority: Low      Past Medical History:   Diagnosis Date     Adrenal mass (H)      Arthritis      Complication of anesthesia     \"hard time waking up\" after vein stripping     Coronary artery disease      Diabetes mellitus (H)      Gout      Heartburn      Hyperlipidemia      Hypertension      Mumps      PONV (postoperative nausea and vomiting)      Renal disease     stone     Sleep apnea     CPAP     Venous insufficiency      Past Surgical History:   Procedure Laterality Date     COLONOSCOPY       COLONOSCOPY  5/15/2012    Procedure:COLONOSCOPY; COLONOSCOPY; Surgeon:ALICIA HART; Location: GI     CYSTOSCOPY N/A 8/11/2020    Procedure: (1) CYSTOSCOPY, exam under anesthesia, urethral dialation;  Surgeon: Saurav Mane MD;  Location:  OR     ESOPHAGOSCOPY, GASTROSCOPY, DUODENOSCOPY (EGD), COMBINED N/A 5/2/2018    Procedure: COMBINED ESOPHAGOSCOPY, GASTROSCOPY, DUODENOSCOPY (EGD);  ESOPHAGOSCOPY, GASTROSCOPY, DUODENOSCOPY with biopsies, and esophogeal dilation. ;  Surgeon: Mic Gilman MD;  Location:  OR     GENITOURINARY SURGERY  1989    lithrotripsy     LAPAROSCOPIC CHOLECYSTECTOMY N/A 8/11/2020    Procedure: (2) LAPAROSCOPIC CHOLECYSTECTOMY;  Surgeon: Floyd Smith MD;  Location:  OR     ORTHOPEDIC SURGERY  1989    laminectomy L 4-5     ORTHOPEDIC SURGERY  1996    disectomy      VASCULAR SURGERY      vein stripping     Current Outpatient Medications   Medication Sig Dispense Refill     amLODIPine (NORVASC) 2.5 MG tablet Take 1 tablet (2.5 mg) by mouth daily 90 tablet 1     aspirin 81 MG tablet Take 81 mg by mouth daily       baclofen " (LIORESAL) 10 MG tablet Take 1 tablet (10 mg) by mouth daily 180 tablet 1     blood glucose (NO BRAND SPECIFIED) test strip Use to test blood sugar once daily or as directed. 100 strip 1     blood glucose monitoring (NO BRAND SPECIFIED) meter device kit Use to test blood sugar one times daily or as directed. 1 kit 0     carBAMazepine (TEGRETOL XR) 200 MG 12 hr tablet TAKE 1 TABLET (200 MG) BY MOUTH 6 TIMES DAILY 540 tablet 0     clobetasol (TEMOVATE) 0.05 % ointment Apply topically as needed        Desonide Crea-Wound Dress Crea (DESONIL CREAM EX) Externally apply 0.05 oz topically as needed Taro        fexofenadine (ALLEGRA) 180 MG tablet Take 180 mg by mouth daily       finasteride (PROSCAR) 5 MG tablet Take 1 tablet (5 mg) by mouth daily 90 tablet 3     fluticasone (FLONASE) 50 MCG/ACT nasal spray Spray 1 spray into both nostrils daily as needed        furosemide (LASIX) 20 MG tablet Take 1 tablet (20 mg) by mouth daily 90 tablet 1     gabapentin (NEURONTIN) 300 MG capsule TAKE 1 CAPSULE BY MOUTH 4 TIMES DAILY. 360 capsule 1     gatifloxacin (ZYMAXID) 0.5 % ophthalmic solution        ketoconazole (NIZORAL) 2 % external cream Apply topically daily 30 g 3     lisinopril (ZESTRIL) 40 MG tablet Take 1 tablet (40 mg) by mouth daily 90 tablet 1     Magnesium 400 MG CAPS Take by mouth 2 times daily       metoprolol succinate ER (TOPROL-XL) 25 MG 24 hr tablet Take 1 tablet (25 mg) by mouth daily 30 tablet 5     niacin (SLO-NIACIN) 250 MG TBCR CR tablet Take 250 mg by mouth daily        omeprazole (PRILOSEC) 40 MG DR capsule Take 1 capsule (40 mg) by mouth 2 times daily (before meals) 180 capsule 1     Polyethylene Glycol 3350 (MIRALAX PO) Take 1 capful by mouth Mixed in water once daily as needed.       prednisoLONE acetate (PRED FORTE) 1 % ophthalmic suspension        PROLENSA 0.07 % ophthalmic solution        PSYLLIUM HUSK PO Take 750 mg by mouth daily        senna-docusate (SENOKOT-S/PERICOLACE) 8.6-50 MG tablet Take  "2 tablets by mouth 2 times daily as needed        sitagliptin-metFORMIN (JANUMET)  MG tablet Take 1 tablet by mouth 2 times daily (with meals) 180 tablet 1     spironolactone (ALDACTONE) 25 MG tablet Take 1 tablet (25 mg) by mouth daily 90 tablet 1     UNABLE TO FIND MEDICATION NAME: mometasone, hyaluronic acid compound from dermatologist.   Applying topically to areas of itchy skin, per patient.       VITAMIN D, CHOLECALCIFEROL, PO Take 5,000 Units by mouth 2 times daily       vitamin E 400 UNITS TABS Take 1 tablet by mouth daily as needed        zinc sulfate (ZINCATE) 220 (50 Zn) MG capsule Take 220 mg by mouth daily         Allergies   Allergen Reactions     Arthrotec      Atorvastatin      Muscle aches     Augmentin Diarrhea     Carvedilol      Patient had arthralgias, he attributed to carvedilol.     Celebrex [Celecoxib]      Stomach pain     Colestipol      Muscle pain, occurred in Jan 2011     Cozaar [Losartan]      Crestor [Rosuvastatin]      Gemfibrozil      Glucotrol [Glipizide]      Hmg-Coa-R Inhibitors      Muscle Pain     Ibuprofen      Bloody noses a couple of times on this medication.  Occurred in 12/2011     Nabumetone      Relafin - stomach pain     Oxycodone-Acetaminophen Other (See Comments)     Patient states it makes him\"loopy\"     Ozempic [Semaglutide]      trialed x several weeks, extreme nausea.     Vioxx [Rofecoxib]      Zocor [Simvastatin]      Erythromycin Rash     Sulfabenzamide Rash     Zetia [Ezetimibe] Rash     Happened in 2006        Social History     Tobacco Use     Smoking status: Never Smoker     Smokeless tobacco: Never Used   Substance Use Topics     Alcohol use: No     Family History   Problem Relation Age of Onset     Prostate Cancer Father      Coronary Artery Disease No family hx of      History   Drug Use Unknown         Objective     BP (!) 166/74 (BP Location: Right arm, Patient Position: Chair, Cuff Size: Adult Large)   Pulse 88   Temp 97.2  F (36.2  C)   Resp " 20   Wt (!) 158.3 kg (349 lb)   BMI 46.04 kg/m      Physical Exam  GENERAL APPEARANCE: healthy, alert and no distress  HENT: ear canals and TM's normal and nose and mouth without ulcers or lesions  RESP: lungs clear to auscultation - no rales, rhonchi or wheezes  CV: regular rate and rhythm, normal S1 S2, no S3 or S4 and no murmur, click or rub   ABDOMEN: soft, nontender, no HSM or masses and bowel sounds normal    Recent Labs   Lab Test 12/06/21  0749 10/18/21  1425 08/23/21  1003 08/23/21  0000 02/04/21  0000 01/05/21  0000 01/04/21  0000   HGB  --  14.1  --   --   --   --  13.6   PLT  --  242  --   --   --   --  280   NA  --   --   --  130.0*  --  135.4  --    POTASSIUM  --   --   --  4.81  --  5.07  --    CR  --   --   --  0.84  --  0.75  --    A1C 6.4  --  6.5  --    < >  --   --     < > = values in this interval not displayed.        Diagnostics:  No labs were ordered during this visit.   No EKG required for low risk surgery (cataract, skin procedure, breast biopsy, etc).    Revised Cardiac Risk Index (RCRI):  The patient has the following serious cardiovascular risks for perioperative complications:   - No serious cardiac risks = 0 points     RCRI Interpretation: 0 points: Class I (very low risk - 0.4% complication rate)           Signed Electronically by: Josafat Marroquin MD  Copy of this evaluation report is provided to requesting physician.

## 2022-01-26 NOTE — PROGRESS NOTES
[facility]YS[facility]  [address] [place] [address]  [address]  [facility] [address]  Phone: [phone]  Fax: [phone]  Primary Provider: Yonis Marroquin  Pre-op Performing Provider: YONIS MARROQUIN      PREOPERATIVE EVALUATION:  Today's date: 1/26/2022  Regan Aldana is a 76 year old male who presents for a preoperative evaluation.    Surgical Information:  Surgery/Procedure: cataract right eye, 2 weeks left  Surgery Location: [facility]  Surgeon: Dr Guzman  Surgery Date: 2/3/22  Time of Surgery: am  Where patient plans to recover: At home with family  Fax number for surgical facility: [phone]  Type of Anesthesia Anticipated: to be determined    Assessment & Plan     The proposed surgical procedure is considered LOW risk.    Preoperative examination      Age-related cataract of both eyes, unspecified age-related cataract type      Type 2 diabetes mellitus with complication, without long-term current use of insulin (H)  controlled    Essential hypertension  Elevated today, will have pt increase amlodipine to 5 mg daily to help BP, if increased edema he will let me know          Risks and Recommendations:  The patient has the following additional risks and recommendations for perioperative complications:   - Morbid obesity (BMI >40)  Obstructive Sleep Apnea:   cpap    Medication Instructions:  Patient is to take all scheduled medications on the day of surgery EXCEPT for modifications listed below:   - aspirin: Discontinue aspirin 7-10 days prior to procedure to reduce bleeding risk. It should be resumed postoperatively.    - metformin: HOLD day of surgery.    RECOMMENDATION:  APPROVAL GIVEN to proceed with proposed procedure, without further diagnostic evaluation.    Review of external notes as documented above   Review of the result(s) of each unique test - previous labs                  Subjective     1. Yes - Have you ever had a heart attack or stroke? Basilar artery CVA, causes  slight visual file deficit only  2. No - Have you ever had surgery on your heart or blood vessels, such as a stent, coronary (heart) bypass, or surgery on an artery in the head, neck, heart, or legs?  3. No - Do you have chest pain when you are physically active?  4. No - Do you have a history of heart failure?  5. No - Do you currently have a cold, bronchitis, or symptoms of other respiratory (head and chest) infections?  6. No - Do you have a cough, shortness of breath, or wheezing?  7. No - Do you or anyone in your family have a history of blood clots?  8. No - Do you or anyone in your family have a serious bleeding problem, such as long-lasting bleeding after surgeries or cuts?  9. No - Have you ever had anemia or been told to take iron pills?  10. No - Have you had any abnormal blood loss such as black, tarry or bloody stools, or abnormal vaginal bleeding?  11. No - Have you ever had a blood transfusion?  12. Yes - Are you willing to have a blood transfusion if it is medically needed before, during, or after your surgery?  13. No - Have you or anyone in your family ever had problems with anesthesia (sedation for surgery)?  14. Yes - Do you have sleep apnea, excessive snoring, or daytime drowsiness? yes Do you have a CPAP machine? CPAP  15. No - Do you have any artifical heart valves or other implanted medical devices, such as a pacemaker, defibrillator, or continuous glucose monitor?  16. No - Do you have any artifical joints?  17. No - Are you allergic to latex?  18. No - Is there any chance that you may be pregnant?      Health Care Directive:  Patient has a Health Care Directive on file      Preoperative Review of :   reviewed - no record of controlled substances prescribed.      Status of Chronic Conditions:  See problem list for active medical problems.  Problems all longstanding and stable, except as noted/documented.  See ROS for pertinent symptoms related to these conditions.    DIABETES - Patient  has a longstanding history of DiabetesType Type II . Patient is being treated with diet and oral agents and denies significant side effects. Control has been good. Complicating factors include but are not limited to: hypertension, hyperlipidemia, neuropathy and CVA.     HYPERTENSION - Patient has longstanding history of HTN , currently denies any symptoms referable to elevated blood pressure. Specifically denies chest pain, palpitations, dyspnea, orthopnea, PND or peripheral edema. Blood pressure readings have not been in normal range. Current medication regimen is as listed below. Patient denies any side effects of medication.       Review of Systems  CONSTITUTIONAL: NEGATIVE for fever, chills, change in weight  ENT/MOUTH: NEGATIVE for ear, mouth and throat problems  RESP: NEGATIVE for significant cough or SOB  CV: NEGATIVE for chest pain, palpitations or peripheral edema    Patient Active Problem List    Diagnosis Date Noted     Cholecystitis 07/24/2020     Priority: Medium     Added automatically from request for surgery [id number]       Morbid obesity (H) 05/05/2020     Priority: Medium     Supraventricular tachycardia (H) 05/05/2020     Priority: Medium     Myofascial pain 09/13/2017     Priority: Medium     Cerebrovascular accident (CVA) due to occlusion of basilar artery (H) 02/15/2017     Priority: Medium     Primary hypertension      Priority: Medium     Type 2 diabetes mellitus with complication, without long-term current use of insulin (H)      Priority: Medium     Arthritis      Priority: Medium     Sleep apnea      Priority: Medium     CPAP       Hyperlipidemia      Priority: Medium     Venous insufficiency      Priority: Medium     Coronary artery disease      Priority: Medium     Adrenal mass (H)      Priority: Medium     Multiple lung nodules 08/01/2016     Priority: Medium     Chest pain 08/19/2015     Priority: Medium     Trigeminal neuralgia 06/12/2013     Priority: Medium     Fibromyositis  "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/2013     Priority: Medium     Neck pain 02/07/2013     Priority: Medium     Arthralgia of temporomandibular joint 02/07/2013     Priority: Medium     Basal cell carcinoma of lip 10/24/2012     Priority: Medium     Hyperlipidemia type II 04/14/2010     Priority: Medium     Kidney stones 04/14/2010     Priority: Medium     Health Care Home 05/05/2020     Priority: Low      Past Medical History:   Diagnosis Date     Adrenal mass (H)      Arthritis      Complication of anesthesia     \"hard time waking up\" after vein stripping     Coronary artery disease      Diabetes mellitus (H)      Gout      Heartburn      Hyperlipidemia      Hypertension      Mumps      PONV (postoperative nausea and vomiting)      Renal disease     stone     Sleep apnea     CPAP     Venous insufficiency      Past Surgical History:   Procedure Laterality Date     COLONOSCOPY       COLONOSCOPY  5/15/2012    Procedure:COLONOSCOPY; COLONOSCOPY; Surgeon:ALICIA HART; Location: GI     CYSTOSCOPY N/A 8/11/2020    Procedure: (1) CYSTOSCOPY, exam under anesthesia, urethral dialation;  Surgeon: Saurav Mane MD;  Location:  OR     ESOPHAGOSCOPY, GASTROSCOPY, DUODENOSCOPY (EGD), COMBINED N/A 5/2/2018    Procedure: COMBINED ESOPHAGOSCOPY, GASTROSCOPY, DUODENOSCOPY (EGD);  ESOPHAGOSCOPY, GASTROSCOPY, DUODENOSCOPY with biopsies, and esophogeal dilation. ;  Surgeon: Mic Gilman MD;  Location:  OR     GENITOURINARY SURGERY  1989    lithrotripsy     LAPAROSCOPIC CHOLECYSTECTOMY N/A 8/11/2020    Procedure: (2) LAPAROSCOPIC CHOLECYSTECTOMY;  Surgeon: Floyd Smith MD;  Location:  OR     ORTHOPEDIC SURGERY  1989    laminectomy L 4-5     ORTHOPEDIC SURGERY  1996    disectomy      VASCULAR SURGERY      vein stripping     Current Outpatient Medications   Medication Sig Dispense Refill     amLODIPine (NORVASC) 2.5 MG tablet Take 1 tablet (2.5 mg) by mouth daily 90 tablet 1     aspirin 81 MG tablet Take 81 mg by mouth daily       baclofen " (LIORESAL) 10 MG tablet Take 1 tablet (10 mg) by mouth daily 180 tablet 1     blood glucose (NO BRAND SPECIFIED) test strip Use to test blood sugar once daily or as directed. 100 strip 1     blood glucose monitoring (NO BRAND SPECIFIED) meter device kit Use to test blood sugar one times daily or as directed. 1 kit 0     carBAMazepine (TEGRETOL XR) 200 MG 12 hr tablet TAKE 1 TABLET (200 MG) BY MOUTH 6 TIMES DAILY 540 tablet 0     clobetasol (TEMOVATE) 0.05 % ointment Apply topically as needed        Desonide Crea-Wound Dress Crea (DESONIL CREAM EX) Externally apply 0.05 oz topically as needed Taro        fexofenadine (ALLEGRA) 180 MG tablet Take 180 mg by mouth daily       finasteride (PROSCAR) 5 MG tablet Take 1 tablet (5 mg) by mouth daily 90 tablet 3     fluticasone (FLONASE) 50 MCG/ACT nasal spray Spray 1 spray into both nostrils daily as needed        furosemide (LASIX) 20 MG tablet Take 1 tablet (20 mg) by mouth daily 90 tablet 1     gabapentin (NEURONTIN) 300 MG capsule TAKE 1 CAPSULE BY MOUTH 4 TIMES DAILY. 360 capsule 1     gatifloxacin (ZYMAXID) 0.5 % ophthalmic solution        ketoconazole (NIZORAL) 2 % external cream Apply topically daily 30 g 3     lisinopril (ZESTRIL) 40 MG tablet Take 1 tablet (40 mg) by mouth daily 90 tablet 1     Magnesium 400 MG CAPS Take by mouth 2 times daily       metoprolol succinate ER (TOPROL-XL) 25 MG 24 hr tablet Take 1 tablet (25 mg) by mouth daily 30 tablet 5     niacin (SLO-NIACIN) 250 MG TBCR CR tablet Take 250 mg by mouth daily        omeprazole (PRILOSEC) 40 MG DR capsule Take 1 capsule (40 mg) by mouth 2 times daily (before meals) 180 capsule 1     Polyethylene Glycol 3350 (MIRALAX PO) Take 1 capful by mouth Mixed in water once daily as needed.       prednisoLONE acetate (PRED FORTE) 1 % ophthalmic suspension        PROLENSA 0.07 % ophthalmic solution        PSYLLIUM HUSK PO Take 750 mg by mouth daily        senna-docusate (SENOKOT-S/PERICOLACE) 8.6-50 MG tablet Take  "2 tablets by mouth 2 times daily as needed        sitagliptin-metFORMIN (JANUMET)  MG tablet Take 1 tablet by mouth 2 times daily (with meals) 180 tablet 1     spironolactone (ALDACTONE) 25 MG tablet Take 1 tablet (25 mg) by mouth daily 90 tablet 1     UNABLE TO FIND MEDICATION NAME: mometasone, hyaluronic acid compound from dermatologist.   Applying topically to areas of itchy skin, per patient.       VITAMIN D, CHOLECALCIFEROL, PO Take 5,000 Units by mouth 2 times daily       vitamin E 400 UNITS TABS Take 1 tablet by mouth daily as needed        zinc sulfate (ZINCATE) 220 (50 Zn) MG capsule Take 220 mg by mouth daily         Allergies   Allergen Reactions     Arthrotec      Atorvastatin      Muscle aches     Augmentin Diarrhea     Carvedilol      Patient had arthralgias, he attributed to carvedilol.     Celebrex [Celecoxib]      Stomach pain     Colestipol      Muscle pain, occurred in Jan 2011     Cozaar [Losartan]      Crestor [Rosuvastatin]      Gemfibrozil      Glucotrol [Glipizide]      Hmg-Coa-R Inhibitors      Muscle Pain     Ibuprofen      Bloody noses a couple of times on this medication.  Occurred in 12/2011     Nabumetone      Relafin - stomach pain     Oxycodone-Acetaminophen Other (See Comments)     Patient states it makes him\"loopy\"     Ozempic [Semaglutide]      trialed x several weeks, extreme nausea.     Vioxx [Rofecoxib]      Zocor [Simvastatin]      Erythromycin Rash     Sulfabenzamide Rash     Zetia [Ezetimibe] Rash     Happened in 2006        Social History     Tobacco Use     Smoking status: Never Smoker     Smokeless tobacco: Never Used   Substance Use Topics     Alcohol use: No     Family History   Problem Relation Age of Onset     Prostate Cancer Father      Coronary Artery Disease No family hx of      History   Drug Use Unknown         Objective     BP (!) 166/74 (BP Location: Right arm, Patient Position: Chair, Cuff Size: Adult Large)   Pulse 88   Temp 97.2  F (36.2  C)   Resp " 20   Wt (!) 158.3 kg (349 lb)   BMI 46.04 kg/m      Physical Exam  GENERAL APPEARANCE: healthy, alert and no distress  HENT: ear canals and TM's normal and nose and mouth without ulcers or lesions  RESP: lungs clear to auscultation - no rales, rhonchi or wheezes  CV: regular rate and rhythm, normal S1 S2, no S3 or S4 and no murmur, click or rub   ABDOMEN: soft, nontender, no HSM or masses and bowel sounds normal    Recent Labs   Lab Test 12/06/21  0749 10/18/21  1425 08/23/21  1003 08/23/21  0000 02/04/21  0000 01/05/21  0000 01/04/21  0000   HGB  --  14.1  --   --   --   --  13.6   PLT  --  242  --   --   --   --  280   NA  --   --   --  130.0*  --  135.4  --    POTASSIUM  --   --   --  4.81  --  5.07  --    CR  --   --   --  0.84  --  0.75  --    A1C 6.4  --  6.5  --    < >  --   --     < > = values in this interval not displayed.        Diagnostics:  No labs were ordered during this visit.   No EKG required for low risk surgery (cataract, skin procedure, breast biopsy, etc).    Revised Cardiac Risk Index (RCRI):  The patient has the following serious cardiovascular risks for perioperative complications:   - No serious cardiac risks = 0 points     RCRI Interpretation: 0 points: Class I (very low risk - 0.4% complication rate)           Signed Electronically by: Josafat Marroquin MD  Copy of this evaluation report is provided to requesting physician.

## 2022-01-26 NOTE — NURSING NOTE
Jovi Aldana is here for a pre-op exam.    Questioned patient about current smoking habits.  Pt. has never smoked.  PULSE regular  My Chart: active  CLASSIFICATION OF OVERWEIGHT AND OBESITY BY BMI                        Obesity Class           BMI(kg/m2)  Underweight                                    < 18.5  Normal                                         18.5-24.9  Overweight                                     25.0-29.9  OBESITY                     I                  30.0-34.9                             II                 35.0-39.9  EXTREME OBESITY             III                >40                            Patient's  BMI Body mass index is 46.04 kg/m .  http://hin.nhlbi.nih.gov/menuplanner/menu.cgi  Pre-visit planning  Immunizations - up to date  Colonoscopy -   Mammogram -   Asthma -   PHQ9 -    RHODA-7 -    Hearing Test -

## 2022-02-17 PROBLEM — N48.89 PENILE PAIN: Status: RESOLVED | Noted: 2020-07-24 | Resolved: 2021-08-23

## 2022-02-24 DIAGNOSIS — G50.0 TRIGEMINAL NEURALGIA: ICD-10-CM

## 2022-02-25 NOTE — TELEPHONE ENCOUNTER
Pt last seen 12/06/21 advised to return in 6 tablets. This can wait until you are back next week.    Jovi Aldana is requesting a refill of:    Pending Prescriptions:                       Disp   Refills    carBAMazepine (TEGRETOL XR) 200 MG 12 hr *540 ta*0            Sig: TAKE 1 TABLET BY MOUTH 6 TIMES DAILY

## 2022-02-27 RX ORDER — CARBAMAZEPINE 200 MG/1
TABLET, EXTENDED RELEASE ORAL
Qty: 540 TABLET | Refills: 0 | Status: SHIPPED | OUTPATIENT
Start: 2022-02-27 | End: 2022-06-06

## 2022-03-14 ENCOUNTER — TRANSFERRED RECORDS (OUTPATIENT)
Dept: FAMILY MEDICINE | Facility: CLINIC | Age: 77
End: 2022-03-14

## 2022-03-15 ENCOUNTER — OFFICE VISIT (OUTPATIENT)
Dept: FAMILY MEDICINE | Facility: CLINIC | Age: 77
End: 2022-03-15

## 2022-03-15 VITALS
BODY MASS INDEX: 45.78 KG/M2 | WEIGHT: 315 LBS | SYSTOLIC BLOOD PRESSURE: 162 MMHG | TEMPERATURE: 98 F | DIASTOLIC BLOOD PRESSURE: 84 MMHG | HEART RATE: 81 BPM | RESPIRATION RATE: 20 BRPM | OXYGEN SATURATION: 97 %

## 2022-03-15 DIAGNOSIS — I10 ESSENTIAL HYPERTENSION: ICD-10-CM

## 2022-03-15 DIAGNOSIS — E11.8 TYPE 2 DIABETES MELLITUS WITH COMPLICATION, WITHOUT LONG-TERM CURRENT USE OF INSULIN (H): ICD-10-CM

## 2022-03-15 DIAGNOSIS — H49.22 6TH NERVE PALSY, LEFT: Primary | ICD-10-CM

## 2022-03-15 DIAGNOSIS — I63.22 CEREBROVASCULAR ACCIDENT (CVA) DUE TO OCCLUSION OF BASILAR ARTERY (H): ICD-10-CM

## 2022-03-15 DIAGNOSIS — G50.0 TRIGEMINAL NEURALGIA: ICD-10-CM

## 2022-03-15 LAB
% GRANULOCYTES: 70.5 %
ALBUMIN SERPL-MCNC: 4.1 G/DL (ref 3.6–5.1)
ALBUMIN/GLOB SERPL: 1.5 {RATIO} (ref 1–2.5)
ALP SERPL-CCNC: 85 U/L (ref 33–130)
ALT 1742-6: 13 U/L (ref 0–32)
AST 1920-8: 12 U/L (ref 0–35)
BILIRUB SERPL-MCNC: 0.6 MG/DL (ref 0.2–1.2)
BUN SERPL-MCNC: 10 MG/DL (ref 7–25)
BUN/CREATININE RATIO: 13.3 (ref 6–22)
CALCIUM SERPL-MCNC: 9.4 MG/DL (ref 8.6–10.3)
CHLORIDE SERPLBLD-SCNC: 93.1 MMOL/L (ref 98–110)
CO2 SERPL-SCNC: 30.7 MMOL/L (ref 20–32)
CREAT SERPL-MCNC: 0.75 MG/DL (ref 0.6–1.3)
ERYTHROCYTE [SEDIMENTATION RATE] IN BLOOD: 13 MM/HR (ref 0–20)
GLOBULIN, CALCULATED - QUEST: 2.8 (ref 1.9–3.7)
GLUCOSE SERPL-MCNC: 158 MG/DL (ref 60–99)
HBA1C MFR BLD: 6.6 % (ref 4–7)
HCT VFR BLD AUTO: 41.8 % (ref 40–53)
HEMOGLOBIN: 13.4 G/DL (ref 13.3–17.7)
LYMPHOCYTES NFR BLD AUTO: 20.5 %
MCH RBC QN AUTO: 31.9 PG (ref 26–33)
MCHC RBC AUTO-ENTMCNC: 32.1 G/DL (ref 31–36)
MCV RBC AUTO: 99.6 FL (ref 78–100)
MONOCYTES NFR BLD AUTO: 9 %
PLATELET COUNT - QUEST: 305 10^9/L (ref 150–375)
POTASSIUM SERPL-SCNC: 5.35 MMOL/L (ref 3.5–5.3)
PROT SERPL-MCNC: 6.9 G/DL (ref 6.1–8.1)
RBC # BLD AUTO: 4.2 10*12/L (ref 4.4–5.9)
SODIUM SERPL-SCNC: 130.6 MMOL/L (ref 135–146)
WBC # BLD AUTO: 7.8 10*9/L (ref 4–11)

## 2022-03-15 PROCEDURE — 85025 COMPLETE CBC W/AUTO DIFF WBC: CPT | Performed by: FAMILY MEDICINE

## 2022-03-15 PROCEDURE — 83036 HEMOGLOBIN GLYCOSYLATED A1C: CPT | Performed by: FAMILY MEDICINE

## 2022-03-15 PROCEDURE — 80053 COMPREHEN METABOLIC PANEL: CPT | Performed by: FAMILY MEDICINE

## 2022-03-15 PROCEDURE — 86038 ANTINUCLEAR ANTIBODIES: CPT | Mod: 90 | Performed by: FAMILY MEDICINE

## 2022-03-15 PROCEDURE — 36415 COLL VENOUS BLD VENIPUNCTURE: CPT | Performed by: FAMILY MEDICINE

## 2022-03-15 PROCEDURE — 86618 LYME DISEASE ANTIBODY: CPT | Mod: 90 | Performed by: FAMILY MEDICINE

## 2022-03-15 PROCEDURE — 85651 RBC SED RATE NONAUTOMATED: CPT | Performed by: FAMILY MEDICINE

## 2022-03-15 PROCEDURE — 99214 OFFICE O/P EST MOD 30 MIN: CPT | Performed by: FAMILY MEDICINE

## 2022-03-15 NOTE — NURSING NOTE
Chief Complaint   Patient presents with     Follow Up     follow up on double vision concern, review notes from eye doctor with recommendations      Pre-visit Screening:  Immunizations:  up to date  Colonoscopy:  is up to date  Mammogram: NA  Asthma Action Test/Plan:  NA  PHQ9:  na  GAD7:  na  Questioned patient about current smoking habits Pt. has never smokes.  Ok to leave detailed message on voice mail for today's visit only Yes, phone # [phone]

## 2022-03-15 NOTE — PROGRESS NOTES
SUBJECTIVE:  Danieniukra ALEXANDER Katya, a 76 year old male scheduled an appointment to discuss the following issues:     6th nerve palsy, left  Type 2 diabetes mellitus with complication, without long-term current use of insulin (H)  Essential hypertension  History of CVA (cerebrovascular accident)  Pt developed visual field issues on left eye several days ago-saw optometry yesterday and was diagnosed with a 6th nerve palsy-notes reviewed from optometry    Pt was referred here to look for causes    Pt has diabetes but sugars have been well controlled    Pt has trigeminal neuralgia-this affects left jaw and face, comes and goes, much better with carbamazepine    Pt does relate a new mild HA in last 2 days-located orbital and left forehead, not temporal-described as dull ache. NO nausea or vomiting . NO numbness or weakness symptoms . NO fevers    Pt states he does not tolerate MRI due to claustrophobia    Pt does have hx of CVA-basilar -affected upper right field of vision, mainly resolved    BP checks at home 140-150's/60-70's most all checks, we had tried carvedilol last visit and he did not tolerate,      Medical, social, surgical, and family histories reviewed.    Patient Active Problem List   Diagnosis     Trigeminal neuralgia     Primary hypertension     Type 2 diabetes mellitus with complication, without long-term current use of insulin (H)     Arthritis     Sleep apnea     Hyperlipidemia     Venous insufficiency     Coronary artery disease     Adrenal mass (H)     Health Care Home     Morbid obesity (H)     Supraventricular tachycardia (H)     Cholecystitis     Basal cell carcinoma of lip     Cerebrovascular accident (CVA) due to occlusion of basilar artery (H)     Chest pain     Fibromyositis     Hyperlipidemia type II     Kidney stones     Multiple lung nodules     Myofascial pain     Neck pain     Arthralgia of temporomandibular joint     Past Medical History:   Diagnosis Date     Adrenal mass (H)      Arthritis   "    Complication of anesthesia     \"hard time waking up\" after vein stripping     Coronary artery disease      Diabetes mellitus (H)      Gout      Heartburn      Hyperlipidemia      Hypertension      Mumps      PONV (postoperative nausea and vomiting)      Renal disease     stone     Sleep apnea     CPAP     Venous insufficiency      Family History   Problem Relation Age of Onset     Prostate Cancer Father      Coronary Artery Disease No family hx of      Social History     Socioeconomic History     Marital status:      Spouse name: Not on file     Number of children: Not on file     Years of education: Not on file     Highest education level: Not on file   Occupational History     Not on file   Tobacco Use     Smoking status: Never Smoker     Smokeless tobacco: Never Used   Substance and Sexual Activity     Alcohol use: No     Drug use: Never     Sexual activity: Not on file   Other Topics Concern     Parent/sibling w/ CABG, MI or angioplasty before 65F 55M? Not Asked      Service Not Asked     Blood Transfusions Not Asked     Caffeine Concern Not Asked     Occupational Exposure Not Asked     Hobby Hazards Not Asked     Sleep Concern Not Asked     Stress Concern Not Asked     Weight Concern Not Asked     Special Diet No     Back Care Not Asked     Exercise Yes     Bike Helmet Not Asked     Seat Belt Not Asked     Self-Exams Not Asked   Social History Narrative     Not on file     Social Determinants of Health     Financial Resource Strain: Not on file   Food Insecurity: Not on file   Transportation Needs: Not on file   Physical Activity: Not on file   Stress: Not on file   Social Connections: Not on file   Intimate Partner Violence: Not on file   Housing Stability: Not on file     Past Surgical History:   Procedure Laterality Date     COLONOSCOPY       COLONOSCOPY  5/15/2012    Procedure:COLONOSCOPY; COLONOSCOPY; Surgeon:ALICIA HART; Location: GI     CYSTOSCOPY N/A 8/11/2020    Procedure: (1) " CYSTOSCOPY, exam under anesthesia, urethral dialation;  Surgeon: Saurav Mane MD;  Location: [facility]     ESOPHAGOSCOPY, GASTROSCOPY, DUODENOSCOPY (EGD), COMBINED N/A 5/2/2018    Procedure: COMBINED ESOPHAGOSCOPY, GASTROSCOPY, DUODENOSCOPY (EGD);  ESOPHAGOSCOPY, GASTROSCOPY, DUODENOSCOPY with biopsies, and esophogeal dilation. ;  Surgeon: Mic Gilman MD;  Location: [facility] OR     GENITOURINARY SURGERY  1989    lithrotripsy     LAPAROSCOPIC CHOLECYSTECTOMY N/A 8/11/2020    Procedure: (2) LAPAROSCOPIC CHOLECYSTECTOMY;  Surgeon: Floyd Smith MD;  Location: [facility] OR     ORTHOPEDIC SURGERY  1989    laminectomy L 4-5     ORTHOPEDIC SURGERY  1996    disectomy      VASCULAR SURGERY      vein stripping     amLODIPine (NORVASC) 2.5 MG tablet, Take 1 tablet (2.5 mg) by mouth daily  aspirin 81 MG tablet, Take 81 mg by mouth daily  baclofen (LIORESAL) 10 MG tablet, Take 1 tablet (10 mg) by mouth daily  blood glucose (NO BRAND SPECIFIED) test strip, Use to test blood sugar once daily or as directed.  blood glucose monitoring (NO BRAND SPECIFIED) meter device kit, Use to test blood sugar one times daily or as directed.  carBAMazepine (TEGRETOL XR) 200 MG 12 hr tablet, TAKE 1 TABLET BY MOUTH 6 TIMES DAILY  clobetasol (TEMOVATE) 0.05 % ointment, Apply topically as needed   Desonide Crea-Wound Dress Crea (DESONIL CREAM EX), Externally apply 0.05 oz topically as needed Taro   fexofenadine (ALLEGRA) 180 MG tablet, Take 180 mg by mouth daily  finasteride (PROSCAR) 5 MG tablet, Take 1 tablet (5 mg) by mouth daily  fluticasone (FLONASE) 50 MCG/ACT nasal spray, Spray 1 spray into both nostrils daily as needed   furosemide (LASIX) 20 MG tablet, Take 1 tablet (20 mg) by mouth daily  gabapentin (NEURONTIN) 300 MG capsule, TAKE 1 CAPSULE BY MOUTH 4 TIMES DAILY.  gatifloxacin (ZYMAXID) 0.5 % ophthalmic solution,   ketoconazole (NIZORAL) 2 % external cream, Apply topically daily  lisinopril (ZESTRIL) 40 MG tablet, Take 1 tablet (40 mg) by  mouth daily  Magnesium 400 MG CAPS, Take by mouth 2 times daily  niacin (SLO-NIACIN) 250 MG TBCR CR tablet, Take 250 mg by mouth daily   omeprazole (PRILOSEC) 40 MG DR capsule, Take 1 capsule (40 mg) by mouth 2 times daily (before meals)  Polyethylene Glycol 3350 (MIRALAX PO), Take 1 capful by mouth Mixed in water once daily as needed.  prednisoLONE acetate (PRED FORTE) 1 % ophthalmic suspension,   PROLENSA 0.07 % ophthalmic solution,   PSYLLIUM HUSK PO, Take 750 mg by mouth daily   senna-docusate (SENOKOT-S/PERICOLACE) 8.6-50 MG tablet, Take 2 tablets by mouth 2 times daily as needed   sitagliptin-metFORMIN (JANUMET)  MG tablet, Take 1 tablet by mouth 2 times daily (with meals)  spironolactone (ALDACTONE) 25 MG tablet, Take 1 tablet (25 mg) by mouth daily  UNABLE TO FIND, MEDICATION NAME: mometasone, hyaluronic acid compound from dermatologist.   Applying topically to areas of itchy skin, per patient.  VITAMIN D, CHOLECALCIFEROL, PO, Take 5,000 Units by mouth 2 times daily  vitamin E 400 UNITS TABS, Take 1 tablet by mouth daily as needed   zinc sulfate (ZINCATE) 220 (50 Zn) MG capsule, Take 220 mg by mouth daily    No current facility-administered medications on file prior to visit.       Allergies: Arthrotec, Atorvastatin, Augmentin, Carvedilol, Celebrex [celecoxib], Colestipol, Cozaar [losartan], Crestor [rosuvastatin], Gemfibrozil, Glucotrol [glipizide], Hmg-coa-r inhibitors, Ibuprofen, Nabumetone, Oxycodone-acetaminophen, Ozempic [semaglutide], Vioxx [rofecoxib], Zocor [simvastatin], Erythromycin, Sulfabenzamide, and Zetia [ezetimibe]    Immunization History   Administered Date(s) Administered     Influenza (High Dose) 3 valent vaccine 11/12/2014, 10/28/2015, 11/04/2016, 11/20/2017, 11/08/2018     Influenza (IIV3) PF 10/09/2009, 10/15/2010, 11/30/2011, 10/08/2012, 10/14/2013     Influenza Quad, Recombinant, pf(RIV4) (Flublok) 10/31/2019     Influenza Vaccine IM Ages 6-35 Months 4 Valent (PF) 10/16/2012      Influenza Vaccine, 6+MO IM (QUADRIVALENT W/PRESERVATIVES) 11/20/2017, 11/08/2018     Influenza, Quad, High Dose, Pf, 65yr+ (Fluzone HD) 11/10/2020, 12/06/2021     Pneumo Conj 13-V (2010&after) 08/28/2001     Pneumococcal 23 valent 02/07/2001, 06/17/2009     TDAP Vaccine (Boostrix) 06/09/2008     Zoster vaccine, live 03/17/2007        ROS:  CONSTITUTIONAL: NEGATIVE for fever, chills  RESP: NEGATIVE for significant cough or SOB  CV: NEGATIVE for chest pain, palpitations   GI: NEGATIVE for nausea, abdominal pain, heartburn, or change in bowel habits  : NEGATIVE for frequency, dysuria, or hematuria  MUSCULOSKELETAL: NEGATIVE for significant arthralgias or myalgia  NEURO: NEGATIVE for weakness, dizziness or paresthesias or headache  PSYCHIATRIC: NEGATIVE for changes in mood or affect    OBJECTIVE:  BP (!) 162/84 (BP Location: Right arm, Patient Position: Sitting, Cuff Size: Adult Large)   Pulse 81   Temp 98  F (36.7  C) (Temporal)   Resp 20   Wt (!) 157.4 kg (347 lb)   SpO2 97%   BMI 45.78 kg/m    EXAM:  GENERAL APPEARANCE: healthy, alert and no distress  EYES: EOMI,  PERRL  HENT: ear canals and TM's normal and nose and mouth without ulcers or lesions  RESP: lungs clear to auscultation - no rales, rhonchi or wheezes  CV: regular rates and rhythm, normal S1 S2, no S3 or S4 and no murmur, click or rub -  CV: pitting B/L LE edema to midcalf  ABDOMEN:  soft, nontender, no HSM or masses and bowel sounds normal  NEURO: Normal strength and tone, sensory exam grossly normal, mentation intact and speech normal  PSYCH: mentation appears normal and affect normal/bright    ASSESSMENT/PLAN:  (H49.22) 6th nerve palsy, left  (primary encounter diagnosis)  Comment: reviewed this diagnosis and potential causes, pt with multiple risk factors for microvascular mononeuropathy/ischamia (diabetes, HTN, hx CVA) which is most likely culprit- no other symptoms to suggest more ominous etiology-does have mild HA but this is minimal per  pt, we agree it prudent to await labs, if all normal can watch this with expectation the symptoms will improve within 3 months, if any increasing/worsening symptoms needs MRI or CT (he has claustrophobia), if acute worse go to ED  Plan: HEMOGLOBIN A1C ([facility]), HEMOGRAM PLATELET DIFF         ([facility]), ESR WESTERGREN ([facility]), VENOUS COLLECTION,        Comprehensive Metobolic Panel ([facility]), [place] Scrn         Rflx to Titer and Ptrn ([facility]), [place]         Antibodies Total ([facility])        patient given instructions to go to emergency department immediately if worsening of symptoms and verbalizes this understanding     (E11.8) Type 2 diabetes mellitus with complication, without long-term current use of insulin (H)  Comment: well controlled  Plan: continue current medications at current doses     (I10) Essential hypertension  Comment: elevated today  Plan: we agree to increase amlodipine to 5 mg, can take 2 of current rx, if edema worsens let me know, Check blood pressure readings outside of the clinic several times per week, write down values, and follow up if elevated within the next several weeks. Blood pressure can be checked at the firestation, drugstore,  or any valid site.     (Z86.73) History of CVA (cerebrovascular accident)  Comment: no acute CVA symptoms   Plan: continue ASA    38 minute visit, chart review, corrspondence with optometry, exam, discussion, charting, planning, labs *

## 2022-03-17 LAB
ANA PATTERN - QUEST: ABNORMAL
ANA PATTERN 2ND: ABNORMAL
ANA SCREEN - QUEST: POSITIVE
ANTINUCLEAR ABY TITER 2ND: ABNORMAL TITER
ANTINUCLEAR ANTIBODIES - QUEST: ABNORMAL TITER
LYME SCREEN IGG AND IGM: <0.9 INDEX

## 2022-04-15 ENCOUNTER — TRANSFERRED RECORDS (OUTPATIENT)
Dept: FAMILY MEDICINE | Facility: CLINIC | Age: 77
End: 2022-04-15

## 2022-04-16 ENCOUNTER — HEALTH MAINTENANCE LETTER (OUTPATIENT)
Age: 77
End: 2022-04-16

## 2022-05-12 DIAGNOSIS — E11.8 TYPE 2 DIABETES MELLITUS WITH COMPLICATION, WITHOUT LONG-TERM CURRENT USE OF INSULIN (H): ICD-10-CM

## 2022-05-12 DIAGNOSIS — I10 ESSENTIAL HYPERTENSION: ICD-10-CM

## 2022-05-12 DIAGNOSIS — R60.9 EDEMA, UNSPECIFIED TYPE: ICD-10-CM

## 2022-05-13 RX ORDER — FUROSEMIDE 20 MG
TABLET ORAL
Qty: 90 TABLET | Refills: 0 | Status: SHIPPED | OUTPATIENT
Start: 2022-05-13 | End: 2022-08-14

## 2022-05-13 RX ORDER — AMLODIPINE BESYLATE 2.5 MG/1
TABLET ORAL
Qty: 90 TABLET | Refills: 0 | Status: SHIPPED | OUTPATIENT
Start: 2022-05-13 | End: 2022-08-14

## 2022-05-13 RX ORDER — SPIRONOLACTONE 25 MG/1
TABLET ORAL
Qty: 90 TABLET | Refills: 0 | Status: SHIPPED | OUTPATIENT
Start: 2022-05-13 | End: 2022-08-14

## 2022-05-13 RX ORDER — SITAGLIPTIN AND METFORMIN HYDROCHLORIDE 1000; 50 MG/1; MG/1
TABLET, FILM COATED ORAL
Qty: 180 TABLET | Refills: 0 | Status: SHIPPED | OUTPATIENT
Start: 2022-05-13 | End: 2022-08-14

## 2022-05-13 RX ORDER — LISINOPRIL 40 MG/1
TABLET ORAL
Qty: 90 TABLET | Refills: 0 | Status: SHIPPED | OUTPATIENT
Start: 2022-05-13 | End: 2022-08-14

## 2022-05-13 NOTE — TELEPHONE ENCOUNTER
Jovi Aldana is requesting a refill of:    Pending Prescriptions:                       Disp   Refills    lisinopril (ZESTRIL) 40 MG tablet [Pharma*90 tab*0            Sig: TAKE 1 TABLET BY MOUTH EVERY DAY    furosemide (LASIX) 20 MG tablet [Pharmacy*90 tab*0            Sig: TAKE 1 TABLET BY MOUTH EVERY DAY    amLODIPine (NORVASC) 2.5 MG tablet [Pharm*90 tab*0            Sig: TAKE 1 TABLET BY MOUTH EVERY DAY    spironolactone (ALDACTONE) 25 MG tablet [*90 tab*0            Sig: TAKE 1 TABLET BY MOUTH EVERY DAY    JANUMET  MG tablet [Pharmacy Med N*180 ta*0            Sig: TAKE 1 TABLET BY MOUTH TWICE A DAY WITH MEALS    Lab Results   Component Value Date    A1C 6.6 03/15/2022    A1C 6.4 12/06/2021    A1C 6.5 08/23/2021    A1C 6.6 05/13/2021    A1C 6.7 02/04/2021

## 2022-06-03 DIAGNOSIS — G50.0 TRIGEMINAL NEURALGIA: ICD-10-CM

## 2022-06-06 RX ORDER — CARBAMAZEPINE 200 MG/1
TABLET, EXTENDED RELEASE ORAL
Qty: 540 TABLET | Refills: 0 | Status: SHIPPED | OUTPATIENT
Start: 2022-06-06 | End: 2022-09-06

## 2022-06-06 NOTE — TELEPHONE ENCOUNTER
Jovi Aldana is requesting a refill of:    Pending Prescriptions:                       Disp   Refills    carBAMazepine (TEGRETOL XR) 200 MG 12 hr *540 ta*0            Sig: TAKE 1 TABLET BY MOUTH 6 TIMES DAILY    Please close encounter if RX was sent. Thanks, Stephania

## 2022-06-08 NOTE — TELEPHONE ENCOUNTER
Jovi Aldana is requesting a refill of:    Pending Prescriptions:                       Disp   Refills    gabapentin (NEURONTIN) 300 MG capsule [Ph*360 ca*1            Sig: TAKE 1 CAPSULE BY MOUTH 4 TIMES DAILY.    Please close encounter if RX was sent. Thanks, Stephania     Radha Anderson RN

## 2022-06-16 ENCOUNTER — OFFICE VISIT (OUTPATIENT)
Dept: FAMILY MEDICINE | Facility: CLINIC | Age: 77
End: 2022-06-16

## 2022-06-16 VITALS
DIASTOLIC BLOOD PRESSURE: 78 MMHG | TEMPERATURE: 97.2 F | HEART RATE: 76 BPM | WEIGHT: 315 LBS | SYSTOLIC BLOOD PRESSURE: 164 MMHG | BODY MASS INDEX: 46.04 KG/M2 | RESPIRATION RATE: 20 BRPM

## 2022-06-16 DIAGNOSIS — E78.2 MIXED HYPERLIPIDEMIA: ICD-10-CM

## 2022-06-16 DIAGNOSIS — E11.8 TYPE 2 DIABETES MELLITUS WITH COMPLICATION, WITHOUT LONG-TERM CURRENT USE OF INSULIN (H): Primary | ICD-10-CM

## 2022-06-16 DIAGNOSIS — I63.22 CEREBROVASCULAR ACCIDENT (CVA) DUE TO OCCLUSION OF BASILAR ARTERY (H): ICD-10-CM

## 2022-06-16 DIAGNOSIS — G62.9 PERIPHERAL POLYNEUROPATHY: ICD-10-CM

## 2022-06-16 DIAGNOSIS — I10 ESSENTIAL HYPERTENSION: ICD-10-CM

## 2022-06-16 DIAGNOSIS — H49.22 6TH NERVE PALSY, LEFT: ICD-10-CM

## 2022-06-16 DIAGNOSIS — G50.0 TRIGEMINAL NEURALGIA: ICD-10-CM

## 2022-06-16 LAB
ALBUMIN SERPL-MCNC: 4 G/DL (ref 3.6–5.1)
ALBUMIN/GLOB SERPL: 1.4 {RATIO} (ref 1–2.5)
ALP SERPL-CCNC: 83 U/L (ref 33–130)
ALT 1742-6: 15 U/L (ref 0–32)
AST 1920-8: 13 U/L (ref 0–35)
BILIRUB SERPL-MCNC: 0.5 MG/DL (ref 0.2–1.2)
BUN SERPL-MCNC: 9 MG/DL (ref 7–25)
BUN/CREATININE RATIO: 12.7 (ref 6–22)
CALCIUM SERPL-MCNC: 8.9 MG/DL (ref 8.6–10.3)
CHLORIDE SERPLBLD-SCNC: 94.2 MMOL/L (ref 98–110)
CHOLEST SERPL-MCNC: 257 MG/DL (ref 0–199)
CHOLEST/HDLC SERPL: 4 {RATIO} (ref 0–5)
CO2 SERPL-SCNC: 31.5 MMOL/L (ref 20–32)
CREAT SERPL-MCNC: 0.71 MG/DL (ref 0.6–1.3)
GLOBULIN, CALCULATED - QUEST: 2.9 (ref 1.9–3.7)
GLUCOSE SERPL-MCNC: 152 MG/DL (ref 60–99)
HBA1C MFR BLD: 6.9 % (ref 4–7)
HDLC SERPL-MCNC: 73 MG/DL (ref 40–150)
LDLC SERPL CALC-MCNC: 163 MG/DL (ref 0–130)
POTASSIUM SERPL-SCNC: 5.01 MMOL/L (ref 3.5–5.3)
PROT SERPL-MCNC: 6.9 G/DL (ref 6.1–8.1)
SODIUM SERPL-SCNC: 132.3 MMOL/L (ref 135–146)
TRIGL SERPL-MCNC: 107 MG/DL (ref 0–149)

## 2022-06-16 PROCEDURE — 83036 HEMOGLOBIN GLYCOSYLATED A1C: CPT | Performed by: FAMILY MEDICINE

## 2022-06-16 PROCEDURE — 99214 OFFICE O/P EST MOD 30 MIN: CPT | Performed by: FAMILY MEDICINE

## 2022-06-16 PROCEDURE — 36415 COLL VENOUS BLD VENIPUNCTURE: CPT | Performed by: FAMILY MEDICINE

## 2022-06-16 PROCEDURE — 80053 COMPREHEN METABOLIC PANEL: CPT | Performed by: FAMILY MEDICINE

## 2022-06-16 PROCEDURE — 80061 LIPID PANEL: CPT | Performed by: FAMILY MEDICINE

## 2022-06-16 RX ORDER — BACLOFEN 10 MG/1
10 TABLET ORAL DAILY
Qty: 180 TABLET | Refills: 1 | Status: SHIPPED | OUTPATIENT
Start: 2022-06-16 | End: 2023-08-01

## 2022-06-16 NOTE — NURSING NOTE
Jovi Aldana is here for a medication check and also possible side effects from the amlodipine. Swelling in feet, and neck and shoulder pain.    Questioned patient about current smoking habits.  Pt. has never smoked.  PULSE regular  My Chart: active  CLASSIFICATION OF OVERWEIGHT AND OBESITY BY BMI                        Obesity Class           BMI(kg/m2)  Underweight                                    < 18.5  Normal                                         18.5-24.9  Overweight                                     25.0-29.9  OBESITY                     I                  30.0-34.9                             II                 35.0-39.9  EXTREME OBESITY             III                >40                            Patient's  BMI Body mass index is 46.04 kg/m .  http://hin.nhlbi.nih.gov/menuplanner/menu.cgi  Pre-visit planning  Immunizations - up to date  Colonoscopy -   Mammogram -   Asthma -   PHQ9 -    RHODA-7 -

## 2022-06-16 NOTE — PROGRESS NOTES
"  Assessment & Plan     Type 2 diabetes mellitus with complication, without long-term current use of insulin (H)  Control has slipped some, .cchsb , see MTM  - HEMOGLOBIN A1C ([facility])  - FOOT EXAM  NO CHARGE [40739.114]  - VENOUS COLLECTION  - Comprehensive Metobolic Panel ([facility])    Mixed hyperlipidemia  Control uncertain, not on statin due to intolerances  - Lipid Panel ([facility])  - VENOUS COLLECTION  - Comprehensive Metobolic Panel ([facility])    Essential hypertension  Poor control despite increased amlodipine, getting edema-will have sherri go back to 2.5 mg only, see MTM to see if other good options for BP he can tolerate  - VENOUS COLLECTION  - Comprehensive Metobolic Panel ([facility])  - Other Specialty Referral    Trigeminal neuralgia  Stable, continue current medications at current doses   - baclofen (LIORESAL) 10 MG tablet  Dispense: 180 tablet; Refill: 1    Cerebrovascular accident (CVA) due to occlusion of basilar artery (H)  Stable but poor BP control  - Other Specialty Referral    6th nerve palsy, left  resolving    Peripheral polyneuropathy  Patient is having persistent symptoms despite previous therapies. Seeing foot doctor      Review of external notes as documented elsewhere in note  Review of the result(s) of each unique test - labs  Ordering of each unique test  Prescription drug management         BMI:   Estimated body mass index is 46.04 kg/m  as calculated from the following:    Height as of 1/26/22: 1.854 m (6' 1\").    Weight as of this encounter: 158.3 kg (349 lb).   Weight management plan: Discussed healthy diet and exercise guidelines    FUTURE APPOINTMENTS:       - Follow-up visit in 6 mo  Work on weight loss  Regular exercise    No follow-ups on file.    Josafat Marroquin MD  [facility]    Subjective   Peterson is a 77 year old, presenting for the following health issues:  Recheck Medication      HPI     Diabetes Follow-up-Janumet    How often are you checking your blood sugar? One time " daily  What time of day are you checking your blood sugars (select all that apply)?  Before meals  118 average  Have you had any blood sugars above 200?  No  Have you had any blood sugars below 70?  No    What symptoms do you notice when your blood sugar is low?  Shaky    What concerns do you have today about your diabetes? None     Do you have any of these symptoms? (Select all that apply)  Numbness in feet and Burning in feet      {Reference  Diabetes Management Resources :749773}        Hyperlipidemia Follow-Up      Are you regularly taking any medication or supplement to lower your cholesterol?   Yes- niacon    Are you having muscle aches or other side effects that you think could be caused by your cholesterol lowering medication?  No    Hypertension Follow-up-amlodipine increased last visit- noting increased edema      Do you check your blood pressure regularly outside of the clinic? Yes   150's/70's    Are you following a low salt diet? No    Are your blood pressures ever more than 140 on the top number (systolic) OR more   than 90 on the bottom number (diastolic), for example 140/90? Yes    BP Readings from Last 2 Encounters:   06/16/22 (!) 164/78   03/15/22 (!) 162/84     Hemoglobin A1C POCT (%)   Date Value   03/15/2022 6.6   12/06/2021 6.4     LDL Cholesterol Calculated (mg/dL)   Date Value   08/09/2017 178 (H)   01/30/2017 184 (H)     LDL Cholesterol Direct (mg/dL)   Date Value   08/23/2021 143 (A)       Cerebrovascular Follow-up      Patient history: ischemic cerebrovascular incident    Residual symptoms: None    Worsened or new symptoms since last visit: No    Daily aspirin use: Yes    Hypertension controlled: No     Trigeminal neuralgia- stable, on tegretol, getting U[facility] weekly through chiro    Pt states neuropathy worse in feet, seeing foot doctor this week    How many servings of fruits and vegetables do you eat daily?  2-3    On average, how many sweetened beverages do you drink each day (Examples:  soda, juice, sweet tea, etc.  Do NOT count diet or artificially sweetened beverages)?   2    How many days per week do you exercise enough to make your heart beat faster? 3 or less    How many minutes a day do you exercise enough to make your heart beat faster? 9 or less    How many days per week do you miss taking your medication? 0        Review of Systems   Constitutional, HEENT, cardiovascular, pulmonary, gi and gu systems are negative, except as otherwise noted.      Objective    BP (!) 164/78 (BP Location: Right arm, Patient Position: Chair, Cuff Size: Adult Large)   Pulse 76   Temp 97.2  F (36.2  C) (Temporal)   Resp 20   Wt (!) 158.3 kg (349 lb)   BMI 46.04 kg/m    Body mass index is 46.04 kg/m .  Physical Exam   GENERAL: healthy, alert and no distress  EYES: Eyes grossly normal to inspection, PERRL and conjunctivae and sclerae normal  HENT: ear canals and TM's normal, nose and mouth without ulcers or lesions  NECK: no adenopathy, no asymmetry, masses, or scars and thyroid normal to palpation  RESP: lungs clear to auscultation - no rales, rhonchi or wheezes  CV: regular rate and rhythm, normal S1 S2, no S3 or S4, no murmur, click or rub, no peripheral edema and peripheral pulses strong  ABDOMEN: soft, nontender, no hepatosplenomegaly, no masses and bowel sounds normal  MS: no gross musculoskeletal defects noted, no edema  Diabetic foot exam: no trophic changes or ulcerative lesions    Results for orders placed or performed in visit on 06/16/22 (from the past 24 hour(s))   HEMOGLOBIN A1C ([facility])   Result Value Ref Range    Hemoglobin A1C POCT 6.9 4.0 - 7.0 %                   .  ..

## 2022-06-22 ENCOUNTER — TELEPHONE (OUTPATIENT)
Dept: FAMILY MEDICINE | Facility: CLINIC | Age: 77
End: 2022-06-22

## 2022-06-23 ENCOUNTER — OFFICE VISIT (OUTPATIENT)
Dept: FAMILY MEDICINE | Facility: CLINIC | Age: 77
End: 2022-06-23

## 2022-06-23 DIAGNOSIS — I10 ESSENTIAL HYPERTENSION: Primary | ICD-10-CM

## 2022-06-23 PROCEDURE — 99205 OFFICE O/P NEW HI 60 MIN: CPT | Performed by: PHARMACIST

## 2022-06-23 RX ORDER — CLONIDINE HYDROCHLORIDE 0.1 MG/1
0.1 TABLET ORAL 2 TIMES DAILY
Qty: 60 TABLET | Refills: 0 | Status: SHIPPED | OUTPATIENT
Start: 2022-06-23 | End: 2022-07-15

## 2022-06-23 NOTE — Clinical Note
Please review chart. Starting trial of clonidine to see if benefit on systolic BP is seen. Discussed diet and how affecting DM. Will consider addition of SGLT-2 in future if improvement not seen with diet control.

## 2022-06-24 NOTE — PROGRESS NOTES
SUBJECTIVE / OBJECTIVE:                                                Jovi Aldana is a 77 year old male seen for an initial visit for Medication Therapy Management.  He was referred to me by Dr. Josafat Marroquin.     REASON FOR MTM REFERRAL: medication management services     PATIENT CHIEF COMPLAINT/CONCERN: hypertension and type 2 diabetes    PAST MEDICAL HISTORY: Reviewed in chart    MEDICAL CONDITIONS REVIEWED:    hypertension    diabetes mellitus-type 2      Current Outpatient Medications   Medication Sig Dispense Refill     cloNIDine (CATAPRES) 0.1 MG tablet Take 1 tablet (0.1 mg) by mouth 2 times daily for 30 days 60 tablet 0     amLODIPine (NORVASC) 2.5 MG tablet TAKE 1 TABLET BY MOUTH EVERY DAY 90 tablet 0     aspirin 81 MG tablet Take 81 mg by mouth daily       baclofen (LIORESAL) 10 MG tablet Take 1 tablet (10 mg) by mouth daily 180 tablet 1     blood glucose (NO BRAND SPECIFIED) test strip Use to test blood sugar once daily or as directed. 100 strip 1     blood glucose monitoring (NO BRAND SPECIFIED) meter device kit Use to test blood sugar one times daily or as directed. 1 kit 0     carBAMazepine (TEGRETOL XR) 200 MG 12 hr tablet TAKE 1 TABLET BY MOUTH 6 TIMES DAILY 540 tablet 0     clobetasol (TEMOVATE) 0.05 % ointment Apply topically as needed        Desonide Crea-Wound Dress Crea (DESONIL CREAM EX) Externally apply 0.05 oz topically as needed Taro        fexofenadine (ALLEGRA) 180 MG tablet Take 180 mg by mouth daily       finasteride (PROSCAR) 5 MG tablet Take 1 tablet (5 mg) by mouth daily 90 tablet 3     fluticasone (FLONASE) 50 MCG/ACT nasal spray Spray 1 spray into both nostrils daily as needed        furosemide (LASIX) 20 MG tablet TAKE 1 TABLET BY MOUTH EVERY DAY 90 tablet 0     gabapentin (NEURONTIN) 300 MG capsule TAKE 1 CAPSULE BY MOUTH 4 TIMES DAILY. 360 capsule 1     gatifloxacin (ZYMAXID) 0.5 % ophthalmic solution        JANUMET  MG tablet TAKE 1 TABLET BY MOUTH TWICE  A DAY WITH MEALS 180 tablet 0     ketoconazole (NIZORAL) 2 % external cream Apply topically daily 30 g 3     lisinopril (ZESTRIL) 40 MG tablet TAKE 1 TABLET BY MOUTH EVERY DAY 90 tablet 0     Magnesium 400 MG CAPS Take by mouth 2 times daily       niacin (SLO-NIACIN) 250 MG TBCR CR tablet Take 250 mg by mouth daily        omeprazole (PRILOSEC) 40 MG DR capsule Take 1 capsule (40 mg) by mouth 2 times daily (before meals) 180 capsule 1     Polyethylene Glycol 3350 (MIRALAX PO) Take 1 capful by mouth Mixed in water once daily as needed.       prednisoLONE acetate (PRED FORTE) 1 % ophthalmic suspension        PROLENSA 0.07 % ophthalmic solution        PSYLLIUM HUSK PO Take 750 mg by mouth daily        senna-docusate (SENOKOT-S/PERICOLACE) 8.6-50 MG tablet Take 2 tablets by mouth 2 times daily as needed        spironolactone (ALDACTONE) 25 MG tablet TAKE 1 TABLET BY MOUTH EVERY DAY 90 tablet 0     UNABLE TO FIND MEDICATION NAME: mometasone, hyaluronic acid compound from dermatologist.   Applying topically to areas of itchy skin, per patient.       VITAMIN D, CHOLECALCIFEROL, PO Take 5,000 Units by mouth 2 times daily       vitamin E 400 UNITS TABS Take 1 tablet by mouth daily as needed        zinc sulfate (ZINCATE) 220 (50 Zn) MG capsule Take 220 mg by mouth daily         Current labs include:  BP Readings from Last 3 Encounters:   06/16/22 (!) 164/78   03/15/22 (!) 162/84   01/26/22 (!) 166/74       There were no vitals taken for this visit.    Most Recent Immunizations   Administered Date(s) Administered     Influenza (High Dose) 3 valent vaccine 11/08/2018     Influenza (IIV3) PF 10/14/2013     Influenza Quad, Recombinant, pf(RIV4) (Flublok) 10/31/2019     Influenza Vaccine IM Ages 6-35 Months 4 Valent (PF) 10/16/2012     Influenza Vaccine, 6+MO IM (QUADRIVALENT W/PRESERVATIVES) 11/08/2018     Influenza, Quad, High Dose, Pf, 65yr+ (Fluzone HD) 12/06/2021     Pneumo Conj 13-V (2010&after) 08/28/2001     Pneumococcal 23  valent 06/17/2009     TDAP Vaccine (Boostrix) 06/09/2008     Tdap (Adacel,Boostrix) 05/17/2022     Zoster vaccine recombinant adjuvanted (SHINGRIX) 05/17/2022     Zoster vaccine, live 03/17/2007       ASSESSMENT / PLAN:                                                       hypertension:  Assessment: Patient and wife were mainly concerned about talking about hypertension at our visit today. Patient is not able to exercise due to swelling and pain. Patient has lost 60lbs in the last 5 years working on portion control with food, and has stayed stable recently. Patient notes he has been struggling with hypertension since 1998.    When reviewing at home readings, it appears that diastolic pressure remains stable at goal, however systolic pressure remains elevated.     Current medications used for hypertension include lisinopril, spironolactone, amlodipine, and furosemide. Increased amlodipine dose has been tried twice and has resulted in lower extremity edema. Patient is also uninterested in increasing diuretic at this time due to frequent urination.     My recommendation at this time is to initiate clonidine 0.1mg twice daily.     Status: Hypertension not well controlled    Drug Therapy Problems:  1) Additional medication needed    Plan:  1) Initiate clonidine 0.1mg BID. Will follow up with patient in a week to discuss efficacy and determine whether titration may be warranted.    diabetes:  Assessment: Diabetes control has appeared to slip a bit. Discussed that limitations with exercise makes this more difficult, and likely diet changes would be the best option at this time. We discussed what a typical day looks like as far as food is concerned. He consistently eats 3 meals a day, with a snack between lunch and dinner. He does admit to not turning down sweets, and he does have ice cream nearly every night when he takes his medications. We discussed healthier alternates for some of his common unhealthier choices. We  discussed looking at labels when purchasing products at the store and how to determine which would be the better choices.     Status: Diabetic control worsening    Drug Therapy Problems:  1) Likely will need to have discussion about medication addition at future visit.     Plan:  1) Work on healthier choices with diet.  2) Consider medication addition at future appointment if still struggling. Patient struggles with upset stomach/nausea with many medications. Has tried Ozempic but failed due to extreme nausea. I would recommend consideration of SGLT-2 if medication addition is necessary in future.    Patient was interested in discussing knee pain options at this visit as well. Due to lack of time, we had a brief discussion. He is not a candidate for knee replacements. Currently uses biofreeze with come relief as well as hot and cold compressions. Recommended trying Voltaren 1% gel or a topical lidocaine cream to help relieve pain.    I spent 1 hour with this patient today.  All changes were made via collaborative practice agreement with Josafat Marroquin. A copy of the visit note was provided to the patient's primary care provider.    Martha Collier, PharmD  Medication Therapy Management Pharmacist  [facility]  Office Phone: [phone]

## 2022-07-07 ENCOUNTER — TELEPHONE (OUTPATIENT)
Dept: FAMILY MEDICINE | Facility: CLINIC | Age: 77
End: 2022-07-07

## 2022-07-08 NOTE — TELEPHONE ENCOUNTER
Follow up with Odilon concerning addition of clonidine 0.1mg BID. Last week we discontinued amlodipine due to continued edema. With addition of clonidine and discontinuation of amlodipine, we have seen consistent blood pressure readings in the 140s/70s. Patient does note considerable fatigue with addition of clonidine. Options were discussed with Dr. Marroquin, and we decided to take clonidine 0.2mg at bedtime only and follow up with patient in a week to see if fatigue continues and monitor pressures.

## 2022-07-11 ENCOUNTER — OFFICE VISIT (OUTPATIENT)
Dept: FAMILY MEDICINE | Facility: CLINIC | Age: 77
End: 2022-07-11

## 2022-07-11 VITALS
RESPIRATION RATE: 20 BRPM | SYSTOLIC BLOOD PRESSURE: 156 MMHG | DIASTOLIC BLOOD PRESSURE: 64 MMHG | HEIGHT: 73 IN | TEMPERATURE: 97.4 F | HEART RATE: 84 BPM | BODY MASS INDEX: 41.75 KG/M2 | WEIGHT: 315 LBS

## 2022-07-11 DIAGNOSIS — B02.9 HERPES ZOSTER WITHOUT COMPLICATION: Primary | ICD-10-CM

## 2022-07-11 PROCEDURE — 99213 OFFICE O/P EST LOW 20 MIN: CPT | Performed by: PHYSICIAN ASSISTANT

## 2022-07-11 RX ORDER — VALACYCLOVIR HYDROCHLORIDE 1 G/1
1000 TABLET, FILM COATED ORAL 3 TIMES DAILY
Qty: 21 TABLET | Refills: 0 | Status: SHIPPED | OUTPATIENT
Start: 2022-07-11 | End: 2022-09-01

## 2022-07-11 NOTE — NURSING NOTE
Jovi Zhengleticia is here for possible shingles on left shoulder for the past 2 days.    Questioned patient about current smoking habits.  Pt. has never smoked.  PULSE regular  My Chart: active  CLASSIFICATION OF OVERWEIGHT AND OBESITY BY BMI                        Obesity Class           BMI(kg/m2)  Underweight                                    < 18.5  Normal                                         18.5-24.9  Overweight                                     25.0-29.9  OBESITY                     I                  30.0-34.9                             II                 35.0-39.9  EXTREME OBESITY             III                >40                            Patient's  BMI Body mass index is 46.31 kg/m .  http://hin.nhlbi.nih.gov/menuplanner/menu.cgi  Pre-visit planning  Immunizations - up to date  Colonoscopy -   Mammogram -   Asthma -   PHQ9 -    RHODA-7 -

## 2022-07-15 ENCOUNTER — MYC MEDICAL ADVICE (OUTPATIENT)
Dept: FAMILY MEDICINE | Facility: CLINIC | Age: 77
End: 2022-07-15

## 2022-07-15 DIAGNOSIS — I10 ESSENTIAL HYPERTENSION: ICD-10-CM

## 2022-07-15 RX ORDER — CLONIDINE HYDROCHLORIDE 0.1 MG/1
0.1 TABLET ORAL AT BEDTIME
Qty: 180 TABLET | Refills: 0 | Status: SHIPPED | OUTPATIENT
Start: 2022-07-15 | End: 2022-08-04 | Stop reason: DRUGHIGH

## 2022-07-15 NOTE — TELEPHONE ENCOUNTER
Pt is doing better taking this at night     Jovi Aldana is requesting a refill of:    Pending Prescriptions:                       Disp   Refills    cloNIDine (CATAPRES) 0.1 MG tablet [Pharm*180 ta*0            Sig: Take 1 tablet (0.1 mg) by mouth At Bedtime

## 2022-07-26 DIAGNOSIS — B35.4 TINEA CORPORIS: ICD-10-CM

## 2022-07-27 RX ORDER — KETOCONAZOLE 20 MG/G
CREAM TOPICAL DAILY
Qty: 30 G | Refills: 3 | Status: SHIPPED | OUTPATIENT
Start: 2022-07-27

## 2022-07-27 NOTE — TELEPHONE ENCOUNTER
Jovi Aldana is requesting a refill of:    Pending Prescriptions:                       Disp   Refills    ketoconazole (NIZORAL) 2 % external cream*30 g   3            Sig: APPLY TOPICALLY DAILY    Please close encounter if RX was sent. Thanks, Stephania     Pt. Reports dry cough, generalized body aches, fever, and decreased appetite since Saturday.

## 2022-08-03 ENCOUNTER — TELEPHONE (OUTPATIENT)
Dept: FAMILY MEDICINE | Facility: CLINIC | Age: 77
End: 2022-08-03

## 2022-08-03 DIAGNOSIS — I10 ESSENTIAL HYPERTENSION: ICD-10-CM

## 2022-08-03 NOTE — TELEPHONE ENCOUNTER
SUBJECTIVE/OBJECTIVE:                Jovi Aldana is a 77 year old male called for a follow-up visit for Medication Management Services.  He was referred to me from Dr. Josafat Marroquin.     Chief Complaint: Follow up from [facility] visit on 06/23/2022.     Hypertension:  Current Medications:  Current Outpatient Medications   Medication     amLODIPine (NORVASC) 2.5 MG tablet     aspirin 81 MG tablet     baclofen (LIORESAL) 10 MG tablet     blood glucose (NO BRAND SPECIFIED) test strip     blood glucose monitoring (NO BRAND SPECIFIED) meter device kit     carBAMazepine (TEGRETOL XR) 200 MG 12 hr tablet     clobetasol (TEMOVATE) 0.05 % ointment     cloNIDine (CATAPRES) 0.1 MG tablet     Desonide Crea-Wound Dress Crea (DESONIL CREAM EX)     fexofenadine (ALLEGRA) 180 MG tablet     finasteride (PROSCAR) 5 MG tablet     fluticasone (FLONASE) 50 MCG/ACT nasal spray     furosemide (LASIX) 20 MG tablet     gabapentin (NEURONTIN) 300 MG capsule     gatifloxacin (ZYMAXID) 0.5 % ophthalmic solution     JANUMET  MG tablet     ketoconazole (NIZORAL) 2 % external cream     lisinopril (ZESTRIL) 40 MG tablet     Magnesium 400 MG CAPS     niacin (SLO-NIACIN) 250 MG TBCR CR tablet     omeprazole (PRILOSEC) 40 MG DR capsule     Polyethylene Glycol 3350 (MIRALAX PO)     prednisoLONE acetate (PRED FORTE) 1 % ophthalmic suspension     PROLENSA 0.07 % ophthalmic solution     PSYLLIUM HUSK PO     senna-docusate (SENOKOT-S/PERICOLACE) 8.6-50 MG tablet     spironolactone (ALDACTONE) 25 MG tablet     UNABLE TO FIND     valACYclovir (VALTREX) 1000 mg tablet     VITAMIN D, CHOLECALCIFEROL, PO     vitamin E 400 UNITS TABS     zinc sulfate (ZINCATE) 220 (50 Zn) MG capsule     No current facility-administered medications for this visit.     We discussed the benefits and risks of each medication.  Patient Questions/Concerns:  None      ASSESSMENT/PLAN:                Hypertension:  Assessment: Addition of clonidine to regimen was  initiated 06/23/22. Patients blood has dropped to the 150s/70s. Currently taking clonidine 0.1mg at dinner time and again at bedtime to minimize the drowsiness experienced during the daytime.  Status: Patients BP improving, still room to meet goal.  Drug Therapy Problems:  1) Increased dose of clonidine.  Plan:  1) Increase bedtime dose of clonidine to 0.2mg. Keep dinner time dose at 0.1mg.       I spent 15 minutes with this patient today.  All changes were made via collaborative practice agreement with Josafat Marroquin. A copy of the visit note was provided to the patient's primary care pro    Martha Collier, PharmD  Clinical Pharmacist  [facility] Phone: [phone]  Direct Office Phone: [phone]

## 2022-08-04 DIAGNOSIS — K21.00 GASTROESOPHAGEAL REFLUX DISEASE WITH ESOPHAGITIS WITHOUT HEMORRHAGE: ICD-10-CM

## 2022-08-04 RX ORDER — CLONIDINE HYDROCHLORIDE 0.1 MG/1
TABLET ORAL
Qty: 270 TABLET | Refills: 0 | Status: SHIPPED | OUTPATIENT
Start: 2022-08-04 | End: 2022-11-01

## 2022-08-05 RX ORDER — OMEPRAZOLE 40 MG/1
40 CAPSULE, DELAYED RELEASE ORAL
Qty: 180 CAPSULE | Refills: 1 | Status: SHIPPED | OUTPATIENT
Start: 2022-08-05 | End: 2023-01-30

## 2022-08-05 NOTE — TELEPHONE ENCOUNTER
Jovi Aldana is requesting a refill of:    Pending Prescriptions:                       Disp   Refills    omeprazole (PRILOSEC) 40 MG DR capsule [P*180 ca*1            Sig: TAKE 1 CAPSULE (40 MG) BY MOUTH 2 TIMES DAILY           (BEFORE MEALS)

## 2022-08-12 DIAGNOSIS — R60.9 EDEMA, UNSPECIFIED TYPE: ICD-10-CM

## 2022-08-12 DIAGNOSIS — I10 ESSENTIAL HYPERTENSION: ICD-10-CM

## 2022-08-12 DIAGNOSIS — E11.8 TYPE 2 DIABETES MELLITUS WITH COMPLICATION, WITHOUT LONG-TERM CURRENT USE OF INSULIN (H): ICD-10-CM

## 2022-08-12 NOTE — TELEPHONE ENCOUNTER
Jovi Aldana is requesting a refill of:    Pending Prescriptions:                       Disp   Refills    lisinopril (ZESTRIL) 40 MG tablet [Pharma*90 tab*0            Sig: TAKE 1 TABLET BY MOUTH EVERY DAY    JANUMET  MG tablet [Pharmacy Med N*180 ta*0            Sig: TAKE 1 TABLET BY MOUTH TWICE A DAY WITH MEALS    amLODIPine (NORVASC) 2.5 MG tablet [Pharm*90 tab*0            Sig: TAKE 1 TABLET BY MOUTH EVERY DAY    spironolactone (ALDACTONE) 25 MG tablet [*90 tab*0            Sig: TAKE 1 TABLET BY MOUTH EVERY DAY    furosemide (LASIX) 20 MG tablet [Pharmacy*90 tab*0            Sig: TAKE 1 TABLET BY MOUTH EVERY DAY    Lab Results   Component Value Date    A1C 6.9 06/16/2022    A1C 6.6 03/15/2022    A1C 6.4 12/06/2021    A1C 6.5 08/23/2021    A1C 6.6 05/13/2021

## 2022-08-14 RX ORDER — SITAGLIPTIN AND METFORMIN HYDROCHLORIDE 1000; 50 MG/1; MG/1
TABLET, FILM COATED ORAL
Qty: 180 TABLET | Refills: 0 | Status: SHIPPED | OUTPATIENT
Start: 2022-08-14 | End: 2022-11-11

## 2022-08-14 RX ORDER — SPIRONOLACTONE 25 MG/1
TABLET ORAL
Qty: 90 TABLET | Refills: 0 | Status: SHIPPED | OUTPATIENT
Start: 2022-08-14 | End: 2022-11-11

## 2022-08-14 RX ORDER — LISINOPRIL 40 MG/1
TABLET ORAL
Qty: 90 TABLET | Refills: 0 | Status: SHIPPED | OUTPATIENT
Start: 2022-08-14 | End: 2022-11-11

## 2022-08-14 RX ORDER — AMLODIPINE BESYLATE 2.5 MG/1
TABLET ORAL
Qty: 90 TABLET | Refills: 0 | Status: SHIPPED | OUTPATIENT
Start: 2022-08-14 | End: 2022-11-11

## 2022-08-14 RX ORDER — FUROSEMIDE 20 MG
TABLET ORAL
Qty: 90 TABLET | Refills: 0 | Status: SHIPPED | OUTPATIENT
Start: 2022-08-14 | End: 2023-02-14

## 2022-08-29 ENCOUNTER — OFFICE VISIT (OUTPATIENT)
Dept: FAMILY MEDICINE | Facility: CLINIC | Age: 77
End: 2022-08-29

## 2022-08-29 VITALS
TEMPERATURE: 97.6 F | SYSTOLIC BLOOD PRESSURE: 158 MMHG | HEART RATE: 91 BPM | WEIGHT: 315 LBS | BODY MASS INDEX: 48.68 KG/M2 | OXYGEN SATURATION: 96 % | DIASTOLIC BLOOD PRESSURE: 68 MMHG | RESPIRATION RATE: 26 BRPM

## 2022-08-29 DIAGNOSIS — E87.1 LOW SODIUM LEVELS: ICD-10-CM

## 2022-08-29 DIAGNOSIS — Z09 HOSPITAL DISCHARGE FOLLOW-UP: ICD-10-CM

## 2022-08-29 DIAGNOSIS — K11.21 PAROTITIS, ACUTE: Primary | ICD-10-CM

## 2022-08-29 LAB
% GRANULOCYTES: 73.2 %
BUN SERPL-MCNC: 9 MG/DL (ref 7–25)
BUN/CREATININE RATIO: 10.3 (ref 6–22)
CALCIUM SERPL-MCNC: 8.7 MG/DL (ref 8.6–10.3)
CHLORIDE SERPLBLD-SCNC: 91.1 MMOL/L (ref 98–110)
CO2 SERPL-SCNC: 27.9 MMOL/L (ref 20–32)
CREAT SERPL-MCNC: 0.87 MG/DL (ref 0.6–1.3)
GLUCOSE SERPL-MCNC: 191 MG/DL (ref 60–99)
HCT VFR BLD AUTO: 37.6 % (ref 40–53)
HEMOGLOBIN: 12.6 G/DL (ref 13.3–17.7)
LYMPHOCYTES NFR BLD AUTO: 16.4 %
MCH RBC QN AUTO: 32.5 PG (ref 26–33)
MCHC RBC AUTO-ENTMCNC: 33.5 G/DL (ref 31–36)
MCV RBC AUTO: 96.9 FL (ref 78–100)
MONOCYTES NFR BLD AUTO: 10.4 %
PLATELET COUNT - QUEST: 328 10^9/L (ref 150–375)
POTASSIUM SERPL-SCNC: 4.81 MMOL/L (ref 3.5–5.3)
RBC # BLD AUTO: 3.88 10*12/L (ref 4.4–5.9)
SODIUM SERPL-SCNC: 127.6 MMOL/L (ref 135–146)
WBC # BLD AUTO: 7.8 10*9/L (ref 4–11)

## 2022-08-29 PROCEDURE — 36415 COLL VENOUS BLD VENIPUNCTURE: CPT | Performed by: PHYSICIAN ASSISTANT

## 2022-08-29 PROCEDURE — 99214 OFFICE O/P EST MOD 30 MIN: CPT | Performed by: PHYSICIAN ASSISTANT

## 2022-08-29 PROCEDURE — 80048 BASIC METABOLIC PNL TOTAL CA: CPT | Performed by: PHYSICIAN ASSISTANT

## 2022-08-29 PROCEDURE — 85025 COMPLETE CBC W/AUTO DIFF WBC: CPT | Performed by: PHYSICIAN ASSISTANT

## 2022-08-29 RX ORDER — CLOTRIMAZOLE AND BETAMETHASONE DIPROPIONATE 10; .64 MG/G; MG/G
CREAM TOPICAL
COMMUNITY
Start: 2022-08-18

## 2022-08-29 RX ORDER — CLINDAMYCIN HCL 150 MG
450 CAPSULE ORAL
COMMUNITY
Start: 2022-08-27 | End: 2022-09-02

## 2022-08-29 NOTE — PROGRESS NOTES
Assessment & Plan     Parotitis, acute-patient saw ENT who confirmed diagnosis. Has follow up in October. On correct antibiotic per ENT, will continue to monitor  Call with worsening symptoms  - Basic Metabolic Panel (BFP)  - VENOUS COLLECTION  - HEMOGRAM PLATELET DIFF (BFP)    Hospital discharge follow-up  See above for discussion  - Basic Metabolic Panel (BFP)  - VENOUS COLLECTION  - HEMOGRAM PLATELET DIFF (BFP)    Low sodium levels-will recheck today, patient is off Lasix currently and BP is elevated.  Continue to check BP at home-seek emergency care for readings >180/90 on a consistent basis  Drink sugar free sports drink/other electrolyte drinks to maintain Na  Will add Lasix back if normal Na and recheck in next few days   Call with any new symptoms       Follow up pending labs    No follow-ups on file.    Atul Silver PA-C  [facility]    Subjective   Peterson is a 77 year old, presenting for the following health issues:  Hospital F/U (Follow up from urgent care, had low sodium and needs rechecked today, was seen for parotid mass and hyponatremia, patient is not currently taking his lasix because he was told to stay off of it until sodium was rechecked)      HPI       ED/UC Followup:    Facility:  [facility]  Date of visit: 8/27/22  Reason for visit: Hyponatremia and Parotid mass   Current Status: Slowly improving    Saw ENT today: confirmed parotitis. Manipulated parotid to get out pus, MD felt clindamycin was right choice of AB. Will take some time to resolve-3-4 weeks per patient. Will see ENT in October for re-check.    Doing better eating and drinking. Still harder to chew and open jaw. Able to tolerate PO intake.  No fevers/chills.     Not taking Lasix right now with low Na. No symptoms except for leg swelling.         Review of Systems   Constitutional, HEENT, cardiovascular, pulmonary, gi and gu systems are negative, except as otherwise noted.      Objective    BP (!) 158/68 (BP  Location: Right arm, Patient Position: Sitting, Cuff Size: Adult Large)   Pulse 91   Temp 97.6  F (36.4  C) (Temporal)   Resp 26   Wt (!) 167.4 kg (369 lb)   SpO2 96%   BMI 48.68 kg/m    Body mass index is 48.68 kg/m .  Physical Exam   GENERAL: healthy, alert and no distress  HENT: ear canals and TM's normal, nose and mouth without ulcers or lesions  NECK: Facial swelling on L neck/face in area of parotid gland, tender to palpation, no adenopathy  RESP: lungs clear to auscultation - no rales, rhonchi or wheezes  CV: regular rate and rhythm, normal S1 S2, no S3 or S4, 2/6 systolic murmur best heard RUSB  ABDOMEN: soft, nontender, no hepatosplenomegaly, no masses and bowel sounds normal  MS: no gross musculoskeletal defects noted, 2+ pitting edema RUSH to knee  SKIN: no suspicious lesions or rashes  NEURO: Normal strength and tone, mentation intact and speech normal  PSYCH: mentation appears normal, affect normal/bright                    .  ..

## 2022-08-29 NOTE — NURSING NOTE
Chief Complaint   Patient presents with     Hospital F/U     Follow up from urgent care, had low sodium and needs rechecked today, was seen for parotid mass and hyponatremia, patient is not currently taking his lasix because he was told to stay off of it until sodium was rechecked       Pre-visit Screening:  Immunizations:  up to date  Colonoscopy:  is up to date  Mammogram: NA  Asthma Action Test/Plan:  NA  PHQ9:  NA  GAD7:  NA  Questioned patient about current smoking habits Pt. has never smoked.  Ok to leave detailed message on voice mail for today's visit only Yes, phone # [phone]

## 2022-08-30 ENCOUNTER — TELEPHONE (OUTPATIENT)
Dept: FAMILY MEDICINE | Facility: CLINIC | Age: 77
End: 2022-08-30

## 2022-08-30 NOTE — TELEPHONE ENCOUNTER
Patients wife called in stating that since starting the clindamycin that he was put on in the hospital he has a rash that is starting to spread more. He is also having some yeast infection symptoms starting in his private area as well so she is wondering if the patient should continue taking the clindamycin or switch to something else. Routing to Dr. Marroquin for review, please advise.    Patient can be reached at [phone]

## 2022-08-31 NOTE — TELEPHONE ENCOUNTER
Addendum:  Patient is having red, itching rash that is spreading across neck, chest and back. Advised to stop clindamycin and to call ENT for next antibiotic to try as he was just seen for parotitis by them. No anaphylaxis signs. Advised to seek emergency care for any anaphylaxis signs. Patient will try OTC antifungal powder to see if this helps and report back if no improvment.   Atul Silver PA-C  [facility]        Routing to Atul for review, please advise.

## 2022-08-31 NOTE — TELEPHONE ENCOUNTER
If rash spreading should likely stop- ot appears he saw Atul who had info from ENT-please route to him

## 2022-09-01 ENCOUNTER — OFFICE VISIT (OUTPATIENT)
Dept: FAMILY MEDICINE | Facility: CLINIC | Age: 77
End: 2022-09-01

## 2022-09-01 ENCOUNTER — TRANSFERRED RECORDS (OUTPATIENT)
Dept: FAMILY MEDICINE | Facility: CLINIC | Age: 77
End: 2022-09-01

## 2022-09-01 VITALS
HEART RATE: 96 BPM | SYSTOLIC BLOOD PRESSURE: 174 MMHG | DIASTOLIC BLOOD PRESSURE: 76 MMHG | RESPIRATION RATE: 20 BRPM | TEMPERATURE: 97.3 F | WEIGHT: 315 LBS | BODY MASS INDEX: 46.44 KG/M2

## 2022-09-01 DIAGNOSIS — K11.20 PAROTITIS: Primary | ICD-10-CM

## 2022-09-01 DIAGNOSIS — L27.0 DRUG RASH: ICD-10-CM

## 2022-09-01 DIAGNOSIS — E87.1 LOW SODIUM LEVELS: ICD-10-CM

## 2022-09-01 LAB
BUN SERPL-MCNC: 8 MG/DL (ref 7–25)
BUN/CREATININE RATIO: 12.1 (ref 6–22)
CALCIUM SERPL-MCNC: 8.9 MG/DL (ref 8.6–10.3)
CHLORIDE SERPLBLD-SCNC: 88.6 MMOL/L (ref 98–110)
CO2 SERPL-SCNC: 30.1 MMOL/L (ref 20–32)
CREAT SERPL-MCNC: 0.66 MG/DL (ref 0.6–1.3)
GLUCOSE SERPL-MCNC: 141 MG/DL (ref 60–99)
POTASSIUM SERPL-SCNC: 4.91 MMOL/L (ref 3.5–5.3)
SODIUM SERPL-SCNC: 124.5 MMOL/L (ref 135–146)

## 2022-09-01 PROCEDURE — 36415 COLL VENOUS BLD VENIPUNCTURE: CPT | Performed by: FAMILY MEDICINE

## 2022-09-01 PROCEDURE — 99213 OFFICE O/P EST LOW 20 MIN: CPT | Performed by: FAMILY MEDICINE

## 2022-09-01 PROCEDURE — 80048 BASIC METABOLIC PNL TOTAL CA: CPT | Performed by: FAMILY MEDICINE

## 2022-09-01 RX ORDER — LEVOFLOXACIN 500 MG/1
1 TABLET, FILM COATED ORAL DAILY
COMMUNITY
Start: 2022-08-31 | End: 2023-02-14

## 2022-09-01 NOTE — PROGRESS NOTES
"SUBJECTIVE:  Jovi MUNOZ Katya, a 77 year old male scheduled an appointment to discuss the following issues:     Parotitis  Drug rash  Low sodium levels  Pt was seen by ENT and started on clindamycin for parotitis, notes reviewed, pt developed diffuse itchy rash-was told to stop abx yesterday.    He has been taking benadryl for itching since    Pt called Dr Nieves of ENT-told tp switch to levofloxacin.    Pt also need f/u for low sodium-was 127,  3 d ago, 126 8/27/22-was taken off lasix at that time      Medical, social, surgical, and family histories reviewed.    Patient Active Problem List   Diagnosis     Trigeminal neuralgia     Primary hypertension     Type 2 diabetes mellitus with complication, without long-term current use of insulin (H)     Arthritis     Sleep apnea     Hyperlipidemia     Venous insufficiency     Coronary artery disease     Adrenal mass (H)     Health Care Home     Morbid obesity (H)     Supraventricular tachycardia (H)     Cholecystitis     Basal cell carcinoma of lip     Cerebrovascular accident (CVA) due to occlusion of basilar artery (H)     Chest pain     Fibromyositis     Hyperlipidemia type II     Kidney stones     Multiple lung nodules     Myofascial pain     Neck pain     Arthralgia of temporomandibular joint     Past Medical History:   Diagnosis Date     Adrenal mass (H)      Arthritis      Complication of anesthesia     \"hard time waking up\" after vein stripping     Coronary artery disease      Diabetes mellitus (H)      Gout      Heartburn      Hyperlipidemia      Hypertension      Mumps      PONV (postoperative nausea and vomiting)      Renal disease     stone     Sleep apnea     CPAP     Venous insufficiency      Family History   Problem Relation Age of Onset     Prostate Cancer Father      Coronary Artery Disease No family hx of      Social History     Socioeconomic History     Marital status:      Spouse name: Not on file     Number of children: Not on file     Years of " education: Not on file     Highest education level: Not on file   Occupational History     Not on file   Tobacco Use     Smoking status: Never Smoker     Smokeless tobacco: Never Used   Substance and Sexual Activity     Alcohol use: No     Drug use: Never     Sexual activity: Not on file   Other Topics Concern     Parent/sibling w/ CABG, MI or angioplasty before 65F 55M? Not Asked      Service Not Asked     Blood Transfusions Not Asked     Caffeine Concern Not Asked     Occupational Exposure Not Asked     Hobby Hazards Not Asked     Sleep Concern Not Asked     Stress Concern Not Asked     Weight Concern Not Asked     Special Diet No     Back Care Not Asked     Exercise Yes     Bike Helmet Not Asked     Seat Belt Not Asked     Self-Exams Not Asked   Social History Narrative     Not on file     Social Determinants of Health     Financial Resource Strain: Not on file   Food Insecurity: Not on file   Transportation Needs: Not on file   Physical Activity: Not on file   Stress: Not on file   Social Connections: Not on file   Intimate Partner Violence: Not on file   Housing Stability: Not on file     Past Surgical History:   Procedure Laterality Date     COLONOSCOPY       COLONOSCOPY  5/15/2012    Procedure:COLONOSCOPY; COLONOSCOPY; Surgeon:ALICIA HART; Location: GI     CYSTOSCOPY N/A 8/11/2020    Procedure: (1) CYSTOSCOPY, exam under anesthesia, urethral dialation;  Surgeon: Saurav Mane MD;  Location:  OR     ESOPHAGOSCOPY, GASTROSCOPY, DUODENOSCOPY (EGD), COMBINED N/A 5/2/2018    Procedure: COMBINED ESOPHAGOSCOPY, GASTROSCOPY, DUODENOSCOPY (EGD);  ESOPHAGOSCOPY, GASTROSCOPY, DUODENOSCOPY with biopsies, and esophogeal dilation. ;  Surgeon: Mic Gilman MD;  Location:  OR     GENITOURINARY SURGERY  1989    lithrotripsy     LAPAROSCOPIC CHOLECYSTECTOMY N/A 8/11/2020    Procedure: (2) LAPAROSCOPIC CHOLECYSTECTOMY;  Surgeon: Floyd Smith MD;  Location:  OR     ORTHOPEDIC SURGERY  1989     laminectomy L 4-5     ORTHOPEDIC SURGERY  1996    disectomy      VASCULAR SURGERY      vein stripping     amLODIPine (NORVASC) 2.5 MG tablet, TAKE 1 TABLET BY MOUTH EVERY DAY  aspirin 81 MG tablet, Take 81 mg by mouth daily  baclofen (LIORESAL) 10 MG tablet, Take 1 tablet (10 mg) by mouth daily  blood glucose (NO BRAND SPECIFIED) test strip, Use to test blood sugar once daily or as directed.  blood glucose monitoring (NO BRAND SPECIFIED) meter device kit, Use to test blood sugar one times daily or as directed.  carBAMazepine (TEGRETOL XR) 200 MG 12 hr tablet, TAKE 1 TABLET BY MOUTH 6 TIMES DAILY  clindamycin (CLEOCIN) 150 MG capsule, Take 450 mg by mouth  clobetasol (TEMOVATE) 0.05 % ointment, Apply topically as needed   cloNIDine (CATAPRES) 0.1 MG tablet, Take 1 tablet (0.1mg) by mouth at dinnertime, and take 2 tablets (0.2mg) by mouth at bedtime.  clotrimazole-betamethasone (LOTRISONE) 1-0.05 % external cream, APPLY TO AFFECTED AREAS OF BOTH FEET AS NEEDED  Desonide Crea-Wound Dress Crea (DESONIL CREAM EX), Externally apply 0.05 oz topically as needed Taro   fexofenadine (ALLEGRA) 180 MG tablet, Take 180 mg by mouth daily  finasteride (PROSCAR) 5 MG tablet, Take 1 tablet (5 mg) by mouth daily  fluticasone (FLONASE) 50 MCG/ACT nasal spray, Spray 1 spray into both nostrils daily as needed   gabapentin (NEURONTIN) 300 MG capsule, TAKE 1 CAPSULE BY MOUTH 4 TIMES DAILY.  gatifloxacin (ZYMAXID) 0.5 % ophthalmic solution,   JANUMET  MG tablet, TAKE 1 TABLET BY MOUTH TWICE A DAY WITH MEALS  ketoconazole (NIZORAL) 2 % external cream, APPLY TOPICALLY DAILY  levofloxacin (LEVAQUIN) 500 MG tablet, Take 1 tablet by mouth daily  lisinopril (ZESTRIL) 40 MG tablet, TAKE 1 TABLET BY MOUTH EVERY DAY  Magnesium 400 MG CAPS, Take by mouth 2 times daily  niacin (SLO-NIACIN) 250 MG TBCR CR tablet, Take 250 mg by mouth daily   omeprazole (PRILOSEC) 40 MG DR capsule, TAKE 1 CAPSULE (40 MG) BY MOUTH 2 TIMES DAILY (BEFORE  MEALS)  Polyethylene Glycol 3350 (MIRALAX PO), Take 1 capful by mouth Mixed in water once daily as needed.  prednisoLONE acetate (PRED FORTE) 1 % ophthalmic suspension,   PROLENSA 0.07 % ophthalmic solution,   PSYLLIUM HUSK PO, Take 750 mg by mouth daily   senna-docusate (SENOKOT-S/PERICOLACE) 8.6-50 MG tablet, Take 2 tablets by mouth 2 times daily as needed   spironolactone (ALDACTONE) 25 MG tablet, TAKE 1 TABLET BY MOUTH EVERY DAY  UNABLE TO FIND, MEDICATION NAME: mometasone, hyaluronic acid compound from dermatologist.   Applying topically to areas of itchy skin, per patient.  VITAMIN D, CHOLECALCIFEROL, PO, Take 5,000 Units by mouth 2 times daily  vitamin E 400 UNITS TABS, Take 1 tablet by mouth daily as needed   zinc sulfate (ZINCATE) 220 (50 Zn) MG capsule, Take 220 mg by mouth daily  furosemide (LASIX) 20 MG tablet, TAKE 1 TABLET BY MOUTH EVERY DAY    No current facility-administered medications on file prior to visit.       Allergies: Arthrotec, Atorvastatin, Augmentin, Carvedilol, Celebrex [celecoxib], Clindamycin, Colestipol, Cozaar [losartan], Crestor [rosuvastatin], Gemfibrozil, Glucotrol [glipizide], Hmg-coa-r inhibitors, Ibuprofen, Nabumetone, Oxycodone-acetaminophen, Ozempic [semaglutide], Vioxx [rofecoxib], Zocor [simvastatin], Erythromycin, Sulfabenzamide, and Zetia [ezetimibe]    Immunization History   Administered Date(s) Administered     Influenza (High Dose) 3 valent vaccine 11/12/2014, 10/28/2015, 11/04/2016, 11/20/2017, 11/08/2018     Influenza (IIV3) PF 10/09/2009, 10/15/2010, 11/30/2011, 10/08/2012, 10/14/2013     Influenza Quad, Recombinant, pf(RIV4) (Flublok) 10/31/2019     Influenza Vaccine IM Ages 6-35 Months 4 Valent (PF) 10/16/2012     Influenza Vaccine, 6+MO IM (QUADRIVALENT W/PRESERVATIVES) 11/20/2017, 11/08/2018     Influenza, Quad, High Dose, Pf, 65yr+ (Fluzone HD) 11/10/2020, 12/06/2021     Pneumo Conj 13-V (2010&after) 08/28/2001     Pneumococcal 23 valent 02/07/2001, 06/17/2009      TDAP Vaccine (Boostrix) 06/09/2008     Tdap (Adacel,Boostrix) 05/17/2022     Zoster vaccine recombinant adjuvanted (SHINGRIX) 05/17/2022     Zoster vaccine, live 03/17/2007        ROS:  CONSTITUTIONAL: NEGATIVE for fever, chills  EYES: NEGATIVE for vision changes   RESP: NEGATIVE for significant cough or SOB  CV: NEGATIVE for chest pain, palpitations   GI: NEGATIVE for nausea, abdominal pain, heartburn, or change in bowel habits  : NEGATIVE for frequency, dysuria, or hematuria  MUSCULOSKELETAL: NEGATIVE for significant arthralgias or myalgia  NEURO: NEGATIVE for weakness, dizziness or paresthesias or headache    OBJECTIVE:  BP (!) 174/76 (BP Location: Right arm, Patient Position: Chair, Cuff Size: Adult Large)   Pulse 96   Temp 97.3  F (36.3  C)   Resp 20   Wt (!) 159.7 kg (352 lb)   BMI 46.44 kg/m    EXAM:  GENERAL APPEARANCE: healthy, alert and no distress  EYES: EOMI,  PERRL  HENT: ear canals and TM's normal and nose and mouth without ulcers or lesions  RESP: lungs clear to auscultation - no rales, rhonchi or wheezes  CV: regular rates and rhythm, normal S1 S2, no S3 or S4 and no murmur, click or rub -  SKIN: diffuse hives    ASSESSMENT/PLAN:  (K11.20) Parotitis  (primary encounter diagnosis)  Comment: abx switched again, still sore and swollen but better  Plan: continue levaquin    (L27.0) Drug rash  Comment: related to clindamycin, added to allergy list, no signs anaphylaxis  Plan:     (E87.1) Low sodium levels  Comment: recheck, expect improvement off lasix  Plan: Basic Metabolic Panel (BFP), VENOUS COLLECTION        Await results

## 2022-09-02 ENCOUNTER — HOSPITAL ENCOUNTER (OUTPATIENT)
Facility: CLINIC | Age: 77
Setting detail: OBSERVATION
Discharge: HOME | End: 2022-09-03
Attending: EMERGENCY MEDICINE | Admitting: INTERNAL MEDICINE
Payer: MEDICARE

## 2022-09-02 ENCOUNTER — TELEPHONE (OUTPATIENT)
Dept: FAMILY MEDICINE | Facility: CLINIC | Age: 77
End: 2022-09-02

## 2022-09-02 ENCOUNTER — APPOINTMENT (OUTPATIENT)
Dept: GENERAL RADIOLOGY | Facility: CLINIC | Age: 77
End: 2022-09-02
Attending: EMERGENCY MEDICINE
Payer: MEDICARE

## 2022-09-02 DIAGNOSIS — E87.1 HYPONATREMIA: ICD-10-CM

## 2022-09-02 LAB
ALBUMIN UR-MCNC: 10 MG/DL
ANION GAP SERPL CALCULATED.3IONS-SCNC: 8 MMOL/L (ref 7–15)
ANION GAP SERPL CALCULATED.3IONS-SCNC: 8 MMOL/L (ref 7–15)
APPEARANCE UR: CLEAR
BASOPHILS # BLD AUTO: 0.1 10E3/UL (ref 0–0.2)
BASOPHILS NFR BLD AUTO: 1 %
BILIRUB UR QL STRIP: NEGATIVE
BUN SERPL-MCNC: 5.8 MG/DL (ref 8–23)
BUN SERPL-MCNC: 6.7 MG/DL (ref 8–23)
CALCIUM SERPL-MCNC: 8.6 MG/DL (ref 8.8–10.2)
CALCIUM SERPL-MCNC: 9 MG/DL (ref 8.8–10.2)
CHLORIDE SERPL-SCNC: 86 MMOL/L (ref 98–107)
CHLORIDE SERPL-SCNC: 90 MMOL/L (ref 98–107)
COLOR UR AUTO: ABNORMAL
CREAT SERPL-MCNC: 0.5 MG/DL (ref 0.67–1.17)
CREAT SERPL-MCNC: 0.54 MG/DL (ref 0.67–1.17)
CREAT UR-MCNC: 52 MG/DL
DEPRECATED HCO3 PLAS-SCNC: 26 MMOL/L (ref 22–29)
DEPRECATED HCO3 PLAS-SCNC: 29 MMOL/L (ref 22–29)
EOSINOPHIL # BLD AUTO: 0.1 10E3/UL (ref 0–0.7)
EOSINOPHIL NFR BLD AUTO: 1 %
ERYTHROCYTE [DISTWIDTH] IN BLOOD BY AUTOMATED COUNT: 12.8 % (ref 10–15)
GFR SERPL CREATININE-BSD FRML MDRD: >90 ML/MIN/1.73M2
GFR SERPL CREATININE-BSD FRML MDRD: >90 ML/MIN/1.73M2
GLUCOSE SERPL-MCNC: 138 MG/DL (ref 70–99)
GLUCOSE SERPL-MCNC: 210 MG/DL (ref 70–99)
GLUCOSE UR STRIP-MCNC: 100 MG/DL
HCT VFR BLD AUTO: 37.7 % (ref 40–53)
HGB BLD-MCNC: 12.5 G/DL (ref 13.3–17.7)
HGB UR QL STRIP: NEGATIVE
IMM GRANULOCYTES # BLD: 0 10E3/UL
IMM GRANULOCYTES NFR BLD: 0 %
KETONES UR STRIP-MCNC: NEGATIVE MG/DL
LEUKOCYTE ESTERASE UR QL STRIP: NEGATIVE
LYMPHOCYTES # BLD AUTO: 1 10E3/UL (ref 0.8–5.3)
LYMPHOCYTES NFR BLD AUTO: 14 %
MAGNESIUM SERPL-MCNC: 1.8 MG/DL (ref 1.7–2.3)
MCH RBC QN AUTO: 30.9 PG (ref 26.5–33)
MCHC RBC AUTO-ENTMCNC: 33.2 G/DL (ref 31.5–36.5)
MCV RBC AUTO: 93 FL (ref 78–100)
MONOCYTES # BLD AUTO: 0.7 10E3/UL (ref 0–1.3)
MONOCYTES NFR BLD AUTO: 11 %
NEUTROPHILS # BLD AUTO: 5 10E3/UL (ref 1.6–8.3)
NEUTROPHILS NFR BLD AUTO: 73 %
NITRATE UR QL: NEGATIVE
NRBC # BLD AUTO: 0 10E3/UL
NRBC BLD AUTO-RTO: 0 /100
NT-PROBNP SERPL-MCNC: 59 PG/ML (ref 0–1800)
OSMOLALITY SERPL: 264 MMOL/KG (ref 280–301)
OSMOLALITY UR: 370 MMOL/KG (ref 100–1200)
PH UR STRIP: 7 [PH] (ref 5–7)
PLATELET # BLD AUTO: 296 10E3/UL (ref 150–450)
POTASSIUM SERPL-SCNC: 4.6 MMOL/L (ref 3.4–5.3)
POTASSIUM SERPL-SCNC: 5.1 MMOL/L (ref 3.4–5.3)
RBC # BLD AUTO: 4.05 10E6/UL (ref 4.4–5.9)
RBC URINE: <1 /HPF
SARS-COV-2 RNA RESP QL NAA+PROBE: NEGATIVE
SODIUM SERPL-SCNC: 123 MMOL/L (ref 136–145)
SODIUM SERPL-SCNC: 124 MMOL/L (ref 136–145)
SODIUM UR-SCNC: 68 MMOL/L
SP GR UR STRIP: 1.01 (ref 1–1.03)
SQUAMOUS EPITHELIAL: <1 /HPF
TSH SERPL DL<=0.005 MIU/L-ACNC: 1.71 UIU/ML (ref 0.3–4.2)
UROBILINOGEN UR STRIP-MCNC: NORMAL MG/DL
WBC # BLD AUTO: 6.9 10E3/UL (ref 4–11)
WBC URINE: <1 /HPF

## 2022-09-02 PROCEDURE — 96361 HYDRATE IV INFUSION ADD-ON: CPT

## 2022-09-02 PROCEDURE — 80048 BASIC METABOLIC PNL TOTAL CA: CPT | Performed by: EMERGENCY MEDICINE

## 2022-09-02 PROCEDURE — 83935 ASSAY OF URINE OSMOLALITY: CPT | Performed by: EMERGENCY MEDICINE

## 2022-09-02 PROCEDURE — 82570 ASSAY OF URINE CREATININE: CPT | Performed by: EMERGENCY MEDICINE

## 2022-09-02 PROCEDURE — 36415 COLL VENOUS BLD VENIPUNCTURE: CPT | Performed by: EMERGENCY MEDICINE

## 2022-09-02 PROCEDURE — 83930 ASSAY OF BLOOD OSMOLALITY: CPT | Performed by: EMERGENCY MEDICINE

## 2022-09-02 PROCEDURE — 250N000013 HC RX MED GY IP 250 OP 250 PS 637: Performed by: EMERGENCY MEDICINE

## 2022-09-02 PROCEDURE — 99220 PR INITIAL OBSERVATION CARE,LEVEL III: CPT | Performed by: PHYSICIAN ASSISTANT

## 2022-09-02 PROCEDURE — C9803 HOPD COVID-19 SPEC COLLECT: HCPCS

## 2022-09-02 PROCEDURE — 83735 ASSAY OF MAGNESIUM: CPT | Performed by: EMERGENCY MEDICINE

## 2022-09-02 PROCEDURE — G0378 HOSPITAL OBSERVATION PER HR: HCPCS

## 2022-09-02 PROCEDURE — 83880 ASSAY OF NATRIURETIC PEPTIDE: CPT | Performed by: EMERGENCY MEDICINE

## 2022-09-02 PROCEDURE — 84300 ASSAY OF URINE SODIUM: CPT | Performed by: EMERGENCY MEDICINE

## 2022-09-02 PROCEDURE — U0005 INFEC AGEN DETEC AMPLI PROBE: HCPCS | Performed by: EMERGENCY MEDICINE

## 2022-09-02 PROCEDURE — 81001 URINALYSIS AUTO W/SCOPE: CPT | Performed by: EMERGENCY MEDICINE

## 2022-09-02 PROCEDURE — 85014 HEMATOCRIT: CPT | Performed by: EMERGENCY MEDICINE

## 2022-09-02 PROCEDURE — 250N000013 HC RX MED GY IP 250 OP 250 PS 637: Performed by: PHYSICIAN ASSISTANT

## 2022-09-02 PROCEDURE — 258N000003 HC RX IP 258 OP 636: Performed by: EMERGENCY MEDICINE

## 2022-09-02 PROCEDURE — 71046 X-RAY EXAM CHEST 2 VIEWS: CPT

## 2022-09-02 PROCEDURE — 84443 ASSAY THYROID STIM HORMONE: CPT | Performed by: EMERGENCY MEDICINE

## 2022-09-02 PROCEDURE — 96360 HYDRATION IV INFUSION INIT: CPT

## 2022-09-02 PROCEDURE — 99285 EMERGENCY DEPT VISIT HI MDM: CPT | Mod: CS,25

## 2022-09-02 RX ORDER — CARBAMAZEPINE 200 MG/1
400 TABLET, EXTENDED RELEASE ORAL
Status: DISCONTINUED | OUTPATIENT
Start: 2022-09-03 | End: 2022-09-03 | Stop reason: HOSPADM

## 2022-09-02 RX ORDER — DIPHENHYDRAMINE HCL 25 MG
25 TABLET ORAL 2 TIMES DAILY
Status: ON HOLD | COMMUNITY
End: 2022-09-03

## 2022-09-02 RX ORDER — BACLOFEN 10 MG/1
10 TABLET ORAL AT BEDTIME
Status: DISCONTINUED | OUTPATIENT
Start: 2022-09-02 | End: 2022-09-03 | Stop reason: HOSPADM

## 2022-09-02 RX ORDER — AMOXICILLIN 250 MG
2 CAPSULE ORAL 2 TIMES DAILY PRN
Status: DISCONTINUED | OUTPATIENT
Start: 2022-09-02 | End: 2022-09-03 | Stop reason: HOSPADM

## 2022-09-02 RX ORDER — CARBAMAZEPINE 200 MG/1
400 TABLET, EXTENDED RELEASE ORAL AT BEDTIME
Status: DISCONTINUED | OUTPATIENT
Start: 2022-09-02 | End: 2022-09-03 | Stop reason: HOSPADM

## 2022-09-02 RX ORDER — NICOTINE POLACRILEX 4 MG
15-30 LOZENGE BUCCAL
Status: DISCONTINUED | OUTPATIENT
Start: 2022-09-02 | End: 2022-09-03 | Stop reason: HOSPADM

## 2022-09-02 RX ORDER — GABAPENTIN 300 MG/1
300 CAPSULE ORAL 4 TIMES DAILY
Status: DISCONTINUED | OUTPATIENT
Start: 2022-09-02 | End: 2022-09-03 | Stop reason: HOSPADM

## 2022-09-02 RX ORDER — LISINOPRIL 40 MG/1
40 TABLET ORAL DAILY
Status: DISCONTINUED | OUTPATIENT
Start: 2022-09-03 | End: 2022-09-03 | Stop reason: HOSPADM

## 2022-09-02 RX ORDER — CLONIDINE HYDROCHLORIDE 0.1 MG/1
0.1 TABLET ORAL
Status: DISCONTINUED | OUTPATIENT
Start: 2022-09-03 | End: 2022-09-03 | Stop reason: HOSPADM

## 2022-09-02 RX ORDER — CARBAMAZEPINE 200 MG/1
200 TABLET, EXTENDED RELEASE ORAL DAILY
Status: DISCONTINUED | OUTPATIENT
Start: 2022-09-03 | End: 2022-09-03 | Stop reason: HOSPADM

## 2022-09-02 RX ORDER — POLYETHYLENE GLYCOL 3350 17 G/17G
17 POWDER, FOR SOLUTION ORAL DAILY
Status: DISCONTINUED | OUTPATIENT
Start: 2022-09-03 | End: 2022-09-03 | Stop reason: HOSPADM

## 2022-09-02 RX ORDER — CLONIDINE HYDROCHLORIDE 0.1 MG/1
0.2 TABLET ORAL AT BEDTIME
Status: DISCONTINUED | OUTPATIENT
Start: 2022-09-02 | End: 2022-09-03 | Stop reason: HOSPADM

## 2022-09-02 RX ORDER — ACETAMINOPHEN 325 MG/1
975 TABLET ORAL 3 TIMES DAILY
Status: DISCONTINUED | OUTPATIENT
Start: 2022-09-03 | End: 2022-09-03 | Stop reason: HOSPADM

## 2022-09-02 RX ORDER — DIPHENHYDRAMINE HCL 25 MG
25 CAPSULE ORAL 2 TIMES DAILY
Status: DISCONTINUED | OUTPATIENT
Start: 2022-09-02 | End: 2022-09-03

## 2022-09-02 RX ORDER — LEVOFLOXACIN 500 MG/1
500 TABLET, FILM COATED ORAL DAILY
Status: DISCONTINUED | OUTPATIENT
Start: 2022-09-02 | End: 2022-09-03 | Stop reason: HOSPADM

## 2022-09-02 RX ORDER — SODIUM CHLORIDE 9 MG/ML
INJECTION, SOLUTION INTRAVENOUS CONTINUOUS
Status: DISCONTINUED | OUTPATIENT
Start: 2022-09-02 | End: 2022-09-02

## 2022-09-02 RX ORDER — AMLODIPINE BESYLATE 2.5 MG/1
2.5 TABLET ORAL DAILY
Status: DISCONTINUED | OUTPATIENT
Start: 2022-09-03 | End: 2022-09-03 | Stop reason: HOSPADM

## 2022-09-02 RX ORDER — DIPHENHYDRAMINE HCL 25 MG
25 CAPSULE ORAL ONCE
Status: COMPLETED | OUTPATIENT
Start: 2022-09-02 | End: 2022-09-02

## 2022-09-02 RX ORDER — PANTOPRAZOLE SODIUM 40 MG/1
40 TABLET, DELAYED RELEASE ORAL 2 TIMES DAILY
Status: DISCONTINUED | OUTPATIENT
Start: 2022-09-03 | End: 2022-09-03 | Stop reason: HOSPADM

## 2022-09-02 RX ORDER — FINASTERIDE 5 MG/1
5 TABLET, FILM COATED ORAL DAILY
Status: DISCONTINUED | OUTPATIENT
Start: 2022-09-03 | End: 2022-09-03 | Stop reason: HOSPADM

## 2022-09-02 RX ORDER — HYDRALAZINE HYDROCHLORIDE 20 MG/ML
10 INJECTION INTRAMUSCULAR; INTRAVENOUS EVERY 4 HOURS PRN
Status: DISCONTINUED | OUTPATIENT
Start: 2022-09-02 | End: 2022-09-03 | Stop reason: HOSPADM

## 2022-09-02 RX ORDER — DEXTROSE MONOHYDRATE 25 G/50ML
25-50 INJECTION, SOLUTION INTRAVENOUS
Status: DISCONTINUED | OUTPATIENT
Start: 2022-09-02 | End: 2022-09-03 | Stop reason: HOSPADM

## 2022-09-02 RX ADMIN — DIPHENHYDRAMINE HYDROCHLORIDE 25 MG: 25 CAPSULE ORAL at 20:19

## 2022-09-02 RX ADMIN — BACLOFEN 10 MG: 10 TABLET ORAL at 23:26

## 2022-09-02 RX ADMIN — SODIUM CHLORIDE: 9 INJECTION, SOLUTION INTRAVENOUS at 15:53

## 2022-09-02 RX ADMIN — SODIUM CHLORIDE 1000 ML: 9 INJECTION, SOLUTION INTRAVENOUS at 13:52

## 2022-09-02 RX ADMIN — METFORMIN HYDROCHLORIDE: 500 TABLET, FILM COATED ORAL at 17:38

## 2022-09-02 RX ADMIN — CLONIDINE HYDROCHLORIDE 0.2 MG: 0.1 TABLET ORAL at 23:26

## 2022-09-02 RX ADMIN — LEVOFLOXACIN 500 MG: 500 TABLET, FILM COATED ORAL at 23:25

## 2022-09-02 RX ADMIN — DIPHENHYDRAMINE HYDROCHLORIDE 25 MG: 25 CAPSULE ORAL at 23:25

## 2022-09-02 RX ADMIN — GABAPENTIN 300 MG: 300 CAPSULE ORAL at 23:26

## 2022-09-02 ASSESSMENT — ENCOUNTER SYMPTOMS
DIFFICULTY URINATING: 0
DIARRHEA: 0
VOMITING: 0
HEMATURIA: 0
FREQUENCY: 0
DIZZINESS: 1
CONFUSION: 1
SHORTNESS OF BREATH: 1
DYSURIA: 0
WEAKNESS: 1

## 2022-09-02 ASSESSMENT — ACTIVITIES OF DAILY LIVING (ADL)
ADLS_ACUITY_SCORE: 35
ADLS_ACUITY_SCORE: 32
ADLS_ACUITY_SCORE: 35
ADLS_ACUITY_SCORE: 35
ADLS_ACUITY_SCORE: 33
ADLS_ACUITY_SCORE: 35

## 2022-09-02 NOTE — ED NOTES
"[facility]  ED Nurse Handoff Report    Jovi Aldana is a 77 year old male   ED Chief complaint: Abnormal Labs  . ED Diagnosis:   Final diagnoses:   None     Allergies:   Allergies   Allergen Reactions     Arthrotec      Atorvastatin      Muscle aches     Augmentin Diarrhea     Carvedilol      Patient had arthralgias, he attributed to carvedilol.     Celebrex [Celecoxib]      Stomach pain     Clindamycin Hives     Colestipol      Muscle pain, occurred in Jan 2011     Cozaar [Losartan]      Crestor [Rosuvastatin]      Gemfibrozil      Glucotrol [Glipizide]      Hmg-Coa-R Inhibitors      Muscle Pain     Ibuprofen      Bloody noses a couple of times on this medication.  Occurred in 12/2011     Nabumetone      Relafin - stomach pain     Oxycodone-Acetaminophen Other (See Comments)     Patient states it makes him\"loopy\"     Ozempic [Semaglutide]      trialed x several weeks, extreme nausea.     Vioxx [Rofecoxib]      Zocor [Simvastatin]      Erythromycin Rash     Sulfabenzamide Rash     Zetia [Ezetimibe] Rash     Happened in 2006       Code Status: Full Code  Activity level - Baseline/Home:  Assist X 1. Activity Level - Current:   Assist X 1. Lift room needed: No. Bariatric: No   Needed: No   Isolation: No. Infection: Not Applicable.     Vital Signs:   Vitals:    09/02/22 1146 09/02/22 1450 09/02/22 1510   BP: (!) 193/85 (!) 193/86    Pulse: 73 67    Resp: 18     Temp: 97.7  F (36.5  C)     TempSrc: Temporal     SpO2: 97% 97% 97%       Cardiac Rhythm:  ,      Pain level:    Patient confused: No. Patient Falls Risk: Yes.   Elimination Status: Has voided   Patient Report - Initial Complaint: abn labs. Focused Assessment: Sodium low, tremors, fatigue    Tests Performed: see results. Abnormal Results: see results.  Labs Ordered and Resulted from Time of ED Arrival to Time of ED Departure   BASIC METABOLIC PANEL - Abnormal       Result Value    Creatinine 0.54 (*)     Sodium 123 (*)     Potassium " 4.6      Urea Nitrogen 6.7 (*)     Chloride 86 (*)     Carbon Dioxide (CO2) 29      Anion Gap 8      Glucose 138 (*)     GFR Estimate >90      Calcium 9.0     ROUTINE UA WITH MICROSCOPIC REFLEX TO CULTURE - Abnormal    Color Urine Light Yellow      Appearance Urine Clear      Glucose Urine 100  (*)     Bilirubin Urine Negative      Ketones Urine Negative      Specific Gravity Urine 1.014      Blood Urine Negative      pH Urine 7.0      Protein Albumin Urine 10  (*)     Urobilinogen Urine Normal      Nitrite Urine Negative      Leukocyte Esterase Urine Negative      RBC Urine <1      WBC Urine <1      Squamous Epithelials Urine <1     CBC WITH PLATELETS AND DIFFERENTIAL - Abnormal    WBC Count 6.9      RBC Count 4.05 (*)     Hemoglobin 12.5 (*)     Hematocrit 37.7 (*)     MCV 93      MCH 30.9      MCHC 33.2      RDW 12.8      Platelet Count 296      % Neutrophils 73      % Lymphocytes 14      % Monocytes 11      % Eosinophils 1      % Basophils 1      % Immature Granulocytes 0      NRBCs per 100 WBC 0      Absolute Neutrophils 5.0      Absolute Lymphocytes 1.0      Absolute Monocytes 0.7      Absolute Eosinophils 0.1      Absolute Basophils 0.1      Absolute Immature Granulocytes 0.0      Absolute NRBCs 0.0     TSH WITH FREE T4 REFLEX - Normal    TSH 1.71     MAGNESIUM - Normal    Magnesium 1.8     NT PROBNP INPATIENT - Normal    N terminal Pro BNP Inpatient 59     SODIUM RANDOM URINE    Sodium Urine mmol/L 68     OSMOLALITY, RANDOM URINE   OSMOLALITY   CREATININE RANDOM URINE   COVID-19 VIRUS (CORONAVIRUS) BY PCR     XR Chest 2 Views   Final Result   IMPRESSION: PA and lateral views of the chest were obtained.   Cardiomediastinal silhouette is within normal limits. Mild basilar   pulmonary opacities, likely atelectasis. No significant pleural   effusion or pneumothorax.      BERTHA CAMACHO MD            SYSTEM ID:  [id number]         Treatments provided: see MAR  Family Comments: wife at bedside  OBS  brochure/video discussed/provided to patient:  N/A  ED Medications:   Medications   sodium chloride 0.9% infusion (has no administration in time range)   0.9% sodium chloride BOLUS (0 mLs Intravenous Stopped 9/2/22 4313)     Drips infusing:  No  For the majority of the shift, the patient's behavior Green. Interventions performed were n/a.    Sepsis treatment initiated: No     Patient tested for COVID 19 prior to admission: YES    ED Nurse Name/Phone Number: Nany Jalloh RN,   3:52 PM    RECEIVING UNIT ED HANDOFF REVIEW    Above ED Nurse Handoff Report was reviewed: Yes  Reviewed by: Kali Ga RN on September 2, 2022 at 8:01 PM

## 2022-09-02 NOTE — TELEPHONE ENCOUNTER
Pt's wife (Estela) called asking if he should go to ED for saline do to his abnormal labs.    Please advise # [phone]    Thanks, Lali DIAS

## 2022-09-02 NOTE — ED TRIAGE NOTES
Pt presents with sodium 124. Was seen in UR and given 1L last Saturday.   Stopped Lasix on Sunday. Increased fatigue and tremors. BP has been elevated at home over the last few days, SBP 170s-180s.      Triage Assessment     Row Name 09/02/22 1144       Triage Assessment (Adult)    Airway WDL WDL       Cognitive/Neuro/Behavioral WDL    Cognitive/Neuro/Behavioral WDL WDL

## 2022-09-02 NOTE — PHARMACY-ADMISSION MEDICATION HISTORY
Admission medication history interview status for this patient is complete. See [facility] admission navigator for allergy information, prior to admission medications and immunization status.     Medication history interview done, indicate source(s): Patient  Medication history resources (including written lists, pill bottles, clinic record):SureScripts, Care Everywhere, chart review, patient's med list  Pharmacy: [facility]/pharmacy #[id number] [place], [place] - [address] [address] [facility]    Changes made to PTA medication list:  Added: Benadryl  Changed:   - Carbamazepine (see changes in table below)  - Omeprazole 40 mg BID -> 40 mg qAM  - Vitamin D 5000 units BID -> 2000 units BID  - Psyllium 750 mg daily -> 1500 mg daily  Reported as Not Taking: furosemide (see note below)    Removed: clindamycin (rash reaction), prednisolone eye drops (no sig), Prolensa eye drops (no sig), gatifloxacin (no sig)    Actions taken by pharmacist (provider contacted, etc):sticky note to provider     Additional medication history information:patient was told recently to hold furosemide due to hyponatremia. Patient had an allergic reaction to clindamycin (hives) so clindamycin was switched to levofloxacin.     Medication reconciliation/reorder completed by provider prior to medication history?  N    Prior to Admission medications    Medication Sig Last Dose Taking? Auth Provider Long Term End Date   amLODIPine (NORVASC) 2.5 MG tablet TAKE 1 TABLET BY MOUTH EVERY DAY 9/2/2022 at AM Yes Josafat Marroquin MD Yes    aspirin 81 MG tablet Take 81 mg by mouth At Bedtime 9/1/2022 at Unknown time Yes Reported, Patient     baclofen (LIORESAL) 10 MG tablet Take 1 tablet (10 mg) by mouth daily 9/1/2022 at HS Yes Josafat Marroquin MD     carBAMazepine (TEGRETOL XR) 200 MG 12 hr tablet TAKE 1 TABLET BY MOUTH 6 TIMES DAILY  Patient taking differently: 400 mg at breakfast, 200 mg in the afternoon at 3PM, 400 mg at bedtime 9/2/2022 at x1, breakfast Yes Lopez  Josafat Perry MD Yes    clobetasol (TEMOVATE) 0.05 % ointment Apply topically as needed   at PRN Yes Reported, Patient     cloNIDine (CATAPRES) 0.1 MG tablet Take 1 tablet (0.1mg) by mouth at dinnertime, and take 2 tablets (0.2mg) by mouth at bedtime. 9/1/2022 at Unknown time Yes Josafat Marroquin MD Yes    clotrimazole-betamethasone (LOTRISONE) 1-0.05 % external cream APPLY TO AFFECTED AREAS OF BOTH FEET AS NEEDED  at PRN Yes Reported, Patient     Desonide Crea-Wound Dress Crea (DESONIL CREAM EX) Externally apply 0.05 oz topically as needed Taro   at PRN Yes Reported, Patient     diphenhydrAMINE (BENADRYL) 25 MG tablet Take 25 mg by mouth 2 times daily At 3PM and bedtime 9/1/2022 at Unknown time Yes Unknown, Entered By History     fexofenadine (ALLEGRA) 180 MG tablet Take 180 mg by mouth daily 9/2/2022 at AM Yes Reported, Patient     finasteride (PROSCAR) 5 MG tablet Take 1 tablet (5 mg) by mouth daily 9/2/2022 at AM Yes Irwin Cho MD     fluticasone (FLONASE) 50 MCG/ACT nasal spray Spray 1 spray into both nostrils daily as needed   at PRN Yes Reported, Patient     gabapentin (NEURONTIN) 300 MG capsule TAKE 1 CAPSULE BY MOUTH 4 TIMES DAILY. 9/2/2022 at x2, at lunch Yes Josafat Marroquin MD Yes    JANUMET  MG tablet TAKE 1 TABLET BY MOUTH TWICE A DAY WITH MEALS 9/2/2022 at x1, breakfast Yes Josafat Marroquin MD Yes    ketoconazole (NIZORAL) 2 % external cream APPLY TOPICALLY DAILY  at PRN Yes Josafat Marroquin MD     levofloxacin (LEVAQUIN) 500 MG tablet Take 1 tablet by mouth daily For 10 days starting 8/31/22 9/1/2022 at at 3PM Yes Reported, Patient     lisinopril (ZESTRIL) 40 MG tablet TAKE 1 TABLET BY MOUTH EVERY DAY 9/2/2022 at AM Yes Josafat Marroquin MD Yes    Magnesium 400 MG CAPS Take by mouth 2 times daily 9/1/2022 at Unknown time Yes Reported, Patient     niacin (SLO-NIACIN) 250 MG TBCR CR tablet Take 250 mg by mouth daily  9/1/2022 at Unknown  time Yes Reported, Patient     omeprazole (PRILOSEC) 40 MG DR capsule TAKE 1 CAPSULE (40 MG) BY MOUTH 2 TIMES DAILY (BEFORE MEALS)  Patient taking differently: Take 40 mg by mouth daily 9/2/2022 at AM Yes Josafat Marroquin MD     Polyethylene Glycol 3350 (MIRALAX PO) Take 1 capful by mouth Mixed in water once daily as needed.  at PRN Yes Reported, Patient     PSYLLIUM HUSK PO Take 1,500 mg by mouth daily 9/2/2022 at Unknown time Yes Reported, Patient     spironolactone (ALDACTONE) 25 MG tablet TAKE 1 TABLET BY MOUTH EVERY DAY 9/2/2022 at AM Yes Josafat Marroquin MD Yes    VITAMIN D, CHOLECALCIFEROL, PO Take 2,000 Units by mouth 2 times daily 9/2/2022 at Unknown time Yes Reported, Patient     vitamin E 400 UNITS TABS Take 1 tablet by mouth daily as needed   at PRN Yes Reported, Patient     zinc sulfate (ZINCATE) 220 (50 Zn) MG capsule Take 220 mg by mouth daily 9/1/2022 at Unknown time Yes Reported, Patient     blood glucose (NO BRAND SPECIFIED) test strip Use to test blood sugar once daily or as directed.   Josafat Marroquin MD     blood glucose monitoring (NO BRAND SPECIFIED) meter device kit Use to test blood sugar one times daily or as directed.   Kemar Collins MD     furosemide (LASIX) 20 MG tablet TAKE 1 TABLET BY MOUTH EVERY DAY  Patient not taking: Reported on 9/2/2022 Not Taking at Unknown time  Josafat Marroquin MD Yes

## 2022-09-02 NOTE — ED PROVIDER NOTES
History   Chief Complaint:  Abnormal Labs       The history is provided by the patient and the spouse.      Jovi Aldana is a 77 year old male with history of HTN, type II diabetes mellitus, CAD, and CVA who presents for evaluation of a sodium level of 124.5 yesterday at this PCP appointment. Per patient's wife, his sodium has been low the last week (see laboratory results below). She states that he has been more weak, dizzy, and confused since the start of his low sodium levels. He also endorses some shortness of breath with exertion, which is normal for him. Denies vomiting and diarrhea. No urinary symptoms. No worsened leg swelling. Patient has been holding his lasix for one week due to the low sodium. He notes drinking about 8 glasses of water each day on a regular basis, but has been trying to add more sodium to his diet recently. Patient says he monitors his weight regularly and gained a few pounds the last few days.     Patient was seen in the [facility] six days ago for parotitis and was given a 1L bolus of IV fluids at this time. He also was started on Clindamycin during this visit, which he had an allergic reaction to. Patient is now taking Levofloxacin for his parotitis and this seems to be working better. Per his wife, he took Benadryl for his rash, which has since improved.     Laboratory Results:   08/27/2022: Sodium (126)  08/29/2022: Sodium (127.6)  09/01/2022: Sodium (124.5)    Review of Systems   Respiratory: Positive for shortness of breath.    Gastrointestinal: Negative for diarrhea and vomiting.   Genitourinary: Negative for decreased urine volume, difficulty urinating, dysuria, frequency, hematuria and urgency.   Neurological: Positive for dizziness and weakness.   Psychiatric/Behavioral: Positive for confusion.   All other systems reviewed and are negative.    Allergies:  Arthrotec  Atorvastatin  Augmentin  Carvedilol  Celebrex [Celecoxib]  Clindamycin  Colestipol  Cozaar  [Losartan]  Crestor [Rosuvastatin]  Gemfibrozil  Glucotrol [Glipizide]  Hmg-Coa-R Inhibitors  Ibuprofen  Nabumetone  Oxycodone-Acetaminophen  Ozempic [Semaglutide]  Vioxx [Rofecoxib]  Zocor [Simvastatin]  Erythromycin  Sulfabenzamide  Zetia [Ezetimibe]    Medications:  Norvasc  Aspirin  Lioresal  Tegretol  Catapres  Allegra  Proscar  Lasix  Neurontin   Janumet  Levaquin  Zestril   Slo-niacin  Prilosec  Aldactone  Senokot    Past Medical History:     Trigeminal neuralgia  HTN  Type II diabetes mellitus  Arthritis  Sleep apnea  Hyperlipidemia  Venous insufficiency  CAD  Adrenal mass  Morbid obesity   Supraventricular tachycardia   Cholecystitis  Basal cell carcinoma of lip  CVA  Fibromyalgia   Kidney stones  Multiple lung nodules   Concussion   Renal disease   Mumps  Gout   Parotitis   Hyponatremia     Past Surgical History:    Colonoscopy, x2  Cytoscopy  EGD  Lithotripsy  Laparoscopic cholecystectomy  Laminectomy L4-5  Lumbar diskectomy   Vein stripping     Family History:    Father: Prostate cancer  Sister (s): Uterine cancer    Social History:  The patient presents to the ED with his wife  Arrives via private vehicle  PCP: Josafat Marroquin MD, Family Medicine     Physical Exam     Patient Vitals for the past 24 hrs:   BP Temp Temp src Pulse Resp SpO2   09/02/22 1510 -- -- -- -- -- 97 %   09/02/22 1450 (!) 193/86 -- -- 67 -- 97 %   09/02/22 1146 (!) 193/85 97.7  F (36.5  C) Temporal 73 18 97 %     Physical Exam  Vitals and nursing note reviewed.   Constitutional:       General: He is not in acute distress.     Appearance: Normal appearance. He is obese. He is not ill-appearing (chronically ill appearing).   HENT:      Head: Normocephalic and atraumatic.      Right Ear: External ear normal.      Left Ear: External ear normal.   Eyes:      Conjunctiva/sclera: Conjunctivae normal.   Cardiovascular:      Rate and Rhythm: Normal rate and regular rhythm.      Heart sounds: Murmur heard.   Pulmonary:      Effort:  Pulmonary effort is normal. No respiratory distress.      Breath sounds: No wheezing, rhonchi or rales.   Abdominal:      General: Abdomen is flat. There is no distension.      Palpations: Abdomen is soft.      Tenderness: There is no abdominal tenderness. There is no guarding or rebound.   Musculoskeletal:         General: Swelling (BLE) present. No deformity.      Cervical back: Normal range of motion and neck supple.   Skin:     General: Skin is warm and dry.      Findings: No rash.   Neurological:      Mental Status: He is alert and oriented to person, place, and time.   Psychiatric:         Behavior: Behavior normal.           Emergency Department Course   Imaging:  XR Chest 2 Views   Final Result   IMPRESSION: PA and lateral views of the chest were obtained.   Cardiomediastinal silhouette is within normal limits. Mild basilar   pulmonary opacities, likely atelectasis. No significant pleural   effusion or pneumothorax.      BERTHA CAMACHO MD            SYSTEM ID:  [id number]        Report per radiology    Laboratory:  Labs Ordered and Resulted from Time of ED Arrival to Time of ED Departure   BASIC METABOLIC PANEL - Abnormal       Result Value    Creatinine 0.54 (*)     Sodium 123 (*)     Potassium 4.6      Urea Nitrogen 6.7 (*)     Chloride 86 (*)     Carbon Dioxide (CO2) 29      Anion Gap 8      Glucose 138 (*)     GFR Estimate >90      Calcium 9.0     ROUTINE UA WITH MICROSCOPIC REFLEX TO CULTURE - Abnormal    Color Urine Light Yellow      Appearance Urine Clear      Glucose Urine 100  (*)     Bilirubin Urine Negative      Ketones Urine Negative      Specific Gravity Urine 1.014      Blood Urine Negative      pH Urine 7.0      Protein Albumin Urine 10  (*)     Urobilinogen Urine Normal      Nitrite Urine Negative      Leukocyte Esterase Urine Negative      RBC Urine <1      WBC Urine <1      Squamous Epithelials Urine <1     OSMOLALITY - Abnormal    Osmolality Blood 264 (*)    CBC WITH PLATELETS AND  DIFFERENTIAL - Abnormal    WBC Count 6.9      RBC Count 4.05 (*)     Hemoglobin 12.5 (*)     Hematocrit 37.7 (*)     MCV 93      MCH 30.9      MCHC 33.2      RDW 12.8      Platelet Count 296      % Neutrophils 73      % Lymphocytes 14      % Monocytes 11      % Eosinophils 1      % Basophils 1      % Immature Granulocytes 0      NRBCs per 100 WBC 0      Absolute Neutrophils 5.0      Absolute Lymphocytes 1.0      Absolute Monocytes 0.7      Absolute Eosinophils 0.1      Absolute Basophils 0.1      Absolute Immature Granulocytes 0.0      Absolute NRBCs 0.0     TSH WITH FREE T4 REFLEX - Normal    TSH 1.71     MAGNESIUM - Normal    Magnesium 1.8     NT PROBNP INPATIENT - Normal    N terminal Pro BNP Inpatient 59     OSMOLALITY, RANDOM URINE - Normal    Osmolality Urine 370     COVID-19 VIRUS (CORONAVIRUS) BY PCR - Normal    SARS CoV2 PCR Negative     SODIUM RANDOM URINE    Sodium Urine mmol/L 68     CREATININE RANDOM URINE    Creatinine Urine mg/dL 52.0          Emergency Department Course:   Reviewed:  I reviewed nursing notes, vitals, past medical history and Care Everywhere    Assessments:  1236 I obtained history and examined the patient as noted above.   1530 I rechecked the patient and explained findings. He agrees to be admitted to the hospital at this time.     Consults:  1740 I spoke with Rhea Gallagher PA-C accepting for Dr. Teixeira, hospitalist.     Interventions:  Medications   sodium chloride 0.9% infusion ( Intravenous New Bag 22 2396)   0.9% sodium chloride BOLUS (0 mLs Intravenous Stopped 22 1455)   sitagliptin-metformin  mg combo dose ( Oral Given 22 7453)       Disposition:  The patient was admitted to the hospital under the care of Dr. Teixeira.     Impression & Plan     Medical Decision Makin-year-old male with mildly symptomatic hyponatremia.  Unclear etiology.  He has been on Lasix with could be causing it.  He also appears mildly fluid overloaded so that could be contributing  as well.  Also on various medications or could be contributing to this.  His labs today show that his sodium continues to go down, 123 today.  Patient was given normal saline bolus and then followed by infusion.  Discussed with THALIA Frias for Dr. Mendiola, who accepts admission.    Covid-19  Jovi Aldana was evaluated during a global COVID-19 pandemic, which necessitated consideration that the patient might be at risk for infection with the SARS-CoV-2 virus that causes COVID-19.   Applicable protocols for evaluation were followed during the patient's care.   COVID-19 was considered as part of the patient's evaluation. The plan for testing is: A test was obtained today.    COVID-19 Virus (Coronavirus) by PCR Nasopharyngeal Swab: Negative    Diagnosis:    ICD-10-CM    1. Hyponatremia  E87.1        Scribe Disclosure:  I, Teresa Carr, am serving as a scribe at 12:28 PM on 9/2/2022 to document services personally performed by Asa Thompson MD based on my observations and the provider's statements to me.            Asa Thompson MD  09/02/22 1941

## 2022-09-02 NOTE — TELEPHONE ENCOUNTER
Spoke with pt and his wife. They prefer to go to the [facility] for a bag of saline. She stated they have done this in the past. I encouraged them per [facility]'s recommendation to go to the ED. They agreed to go to either the ED or the urgency room right now.

## 2022-09-03 VITALS
BODY MASS INDEX: 41.75 KG/M2 | OXYGEN SATURATION: 97 % | TEMPERATURE: 98.5 F | HEART RATE: 72 BPM | DIASTOLIC BLOOD PRESSURE: 72 MMHG | WEIGHT: 315 LBS | SYSTOLIC BLOOD PRESSURE: 163 MMHG | RESPIRATION RATE: 16 BRPM | HEIGHT: 73 IN

## 2022-09-03 LAB
ANION GAP SERPL CALCULATED.3IONS-SCNC: 8 MMOL/L (ref 7–15)
BUN SERPL-MCNC: 6.2 MG/DL (ref 8–23)
CALCIUM SERPL-MCNC: 8.7 MG/DL (ref 8.8–10.2)
CHLORIDE SERPL-SCNC: 93 MMOL/L (ref 98–107)
CREAT SERPL-MCNC: 0.62 MG/DL (ref 0.67–1.17)
DEPRECATED HCO3 PLAS-SCNC: 28 MMOL/L (ref 22–29)
GFR SERPL CREATININE-BSD FRML MDRD: >90 ML/MIN/1.73M2
GLUCOSE BLDC GLUCOMTR-MCNC: 126 MG/DL (ref 70–99)
GLUCOSE BLDC GLUCOMTR-MCNC: 154 MG/DL (ref 70–99)
GLUCOSE BLDC GLUCOMTR-MCNC: 156 MG/DL (ref 70–99)
GLUCOSE SERPL-MCNC: 135 MG/DL (ref 70–99)
POTASSIUM SERPL-SCNC: 4.5 MMOL/L (ref 3.4–5.3)
SODIUM SERPL-SCNC: 129 MMOL/L (ref 136–145)

## 2022-09-03 PROCEDURE — G0378 HOSPITAL OBSERVATION PER HR: HCPCS

## 2022-09-03 PROCEDURE — 99217 PR OBSERVATION CARE DISCHARGE: CPT | Performed by: PHYSICIAN ASSISTANT

## 2022-09-03 PROCEDURE — 250N000011 HC RX IP 250 OP 636: Performed by: PHYSICIAN ASSISTANT

## 2022-09-03 PROCEDURE — 250N000013 HC RX MED GY IP 250 OP 250 PS 637: Performed by: PHYSICIAN ASSISTANT

## 2022-09-03 PROCEDURE — 36415 COLL VENOUS BLD VENIPUNCTURE: CPT | Performed by: PHYSICIAN ASSISTANT

## 2022-09-03 PROCEDURE — 82962 GLUCOSE BLOOD TEST: CPT

## 2022-09-03 PROCEDURE — 96374 THER/PROPH/DIAG INJ IV PUSH: CPT

## 2022-09-03 PROCEDURE — 80048 BASIC METABOLIC PNL TOTAL CA: CPT | Performed by: PHYSICIAN ASSISTANT

## 2022-09-03 RX ORDER — FUROSEMIDE 20 MG
20 TABLET ORAL ONCE
Status: DISCONTINUED | OUTPATIENT
Start: 2022-09-03 | End: 2022-09-03 | Stop reason: HOSPADM

## 2022-09-03 RX ORDER — DIPHENHYDRAMINE HCL 25 MG
25 CAPSULE ORAL 2 TIMES DAILY PRN
Status: DISCONTINUED | OUTPATIENT
Start: 2022-09-03 | End: 2022-09-03 | Stop reason: HOSPADM

## 2022-09-03 RX ADMIN — CARBAMAZEPINE 400 MG: 200 TABLET, EXTENDED RELEASE ORAL at 00:18

## 2022-09-03 RX ADMIN — LISINOPRIL 40 MG: 40 TABLET ORAL at 08:07

## 2022-09-03 RX ADMIN — HYDRALAZINE HYDROCHLORIDE 10 MG: 20 INJECTION INTRAMUSCULAR; INTRAVENOUS at 00:41

## 2022-09-03 RX ADMIN — FINASTERIDE 5 MG: 5 TABLET, FILM COATED ORAL at 08:07

## 2022-09-03 RX ADMIN — GABAPENTIN 300 MG: 300 CAPSULE ORAL at 08:07

## 2022-09-03 RX ADMIN — METFORMIN HYDROCHLORIDE: 500 TABLET, FILM COATED ORAL at 08:08

## 2022-09-03 RX ADMIN — PANTOPRAZOLE SODIUM 40 MG: 40 TABLET, DELAYED RELEASE ORAL at 08:08

## 2022-09-03 RX ADMIN — CARBAMAZEPINE 400 MG: 200 TABLET, EXTENDED RELEASE ORAL at 09:54

## 2022-09-03 RX ADMIN — ACETAMINOPHEN 975 MG: 325 TABLET, FILM COATED ORAL at 08:07

## 2022-09-03 RX ADMIN — ACETAMINOPHEN 975 MG: 325 TABLET, FILM COATED ORAL at 00:13

## 2022-09-03 RX ADMIN — AMLODIPINE BESYLATE 2.5 MG: 2.5 TABLET ORAL at 08:07

## 2022-09-03 ASSESSMENT — ACTIVITIES OF DAILY LIVING (ADL)
ADLS_ACUITY_SCORE: 39
ADLS_ACUITY_SCORE: 38

## 2022-09-03 NOTE — PLAN OF CARE
PRIMARY DIAGNOSIS: Hyponatremia and weakness   OUTPATIENT/OBSERVATION GOALS TO BE MET BEFORE DISCHARGE  1. Orthostatic performed: No     2. Tolerating PO medications: Yes     3. Return to near baseline physical activity: No     4. Cleared for discharge by consultants (if involved): No     Discharge Planner Nurse   Safe discharge environment identified: Yes  Barriers to discharge: Yes      Pt alert & oriented X 4, SOB with exacerbation, abdominal sounds present x 4. Continent with bowel and bladder. Skin rash all over the body due to allergy from Clindamycin prior to admission. Pt denies itching. Pain on left side jaw due to Parotitis managed with scheduled and PRN Tylenol. 3+ edema and redness on bilateral lower extremities. Compression socks applied, Pt ambulates to the bathroom with assist of one.Spouse is in room helping with cares. Regular diet. IV saline locked and pt re-weighed at 360.4. Last sodium result 129. Continue to provide cares.

## 2022-09-03 NOTE — H&P
[facility]  Admission History and Physical Examination    NAME: Jovi Aldana   : 1945   MRN: [id number]     Date of Admission:  2022    Assessment & Plan   Jovi Aldana is a 77 year old male with past medical history significant for hypertension, diabetes, obesity, trigeminal neuralgia who recently was seen at urgency room on  and diagnosed with parotiditis as well as hyponatremia who presents with ongoing hyponatremia and c/o weakness, fatigue, dizziness.   Sodium at urgent care was 126 and currently today is 123.  Rest of her labs are fairly unremarkable with normal white count and hemoglobin 12.5 which is around his baseline.  It appears that he has previous episodes of hyponatremia with sodium level down to the 130s.  He was started on IV fluid and recommended for admission to the hospital.    #Acute on chronic hyponatremia  Previous sodiums have been in the low 130s and most recently in urgent care was 126 with drifting down to 123s today.  Patient is on Lasix which was discontinued on  and hyponatremia attributed to poor p.o. intake due to recent parotiditis.  Received 1L NS bolus in ED along with 8 hrs of 0.9 NS at 125cc/hr in ED, Na only went to 124 from 123. I don't think this is hypovolemic hyponatremia.   Plan:  -discharge IVF for now overnight   -monitor Na in morning   - Continue to hold Lasix for now  - If Na does not rise then may need to fluid restrict    #Parotiditis on Levaquin  -  Overall improving, following with Dr. Nieves ENT   -  Cont Levaquin and tylenol for pain     #Hypertension  Blood pressure elevated with systolic in the 190s  Has been difficult to control per outpt notes  -Resume clonidine, Norvasc and lisinopril  -We will add as needed hydralazine    #Diabetes mellitus  -Blood glucose stable, last hemoglobin A1c 6.9  -Okay for mod control diet  - Cont Janumet, PRN sliding scale insulin     #BPH  -  Resume Proscar,     #Trigeminal  "neuralgia  - Cont Tegretol, baclofen     # Hyponatremia: Na = 124 mmol/L (Ref range: 136 - 145 mmol/L) on admission, will monitor as appropriate         # Hypertension: home medication list includes antihypertensive(s)    # DMII: A1C = N/A within past 3 months  # Severe Obesity: Estimated body mass index is 49.04 kg/m  as calculated from the following:    Height as of this encounter: 1.854 m (6' 1\").    Weight as of this encounter: 168.6 kg (371 lb 11.2 oz).      Awaiting formal pharmacy reconciliation to resume home medications.     DVT Prophylaxis: Low Risk/Ambulatory with no VTE prophylaxis indicated  Code Status: Full Code  COVID PCR TESTING STATUS: Negative         1-2 days    Rhea Gallagher PA-C    Primary Care Physician   Josafat Marroquin    Chief Complaint   Weakness     History is obtained from the patient    Discussed with Dr. Thompson in the ED, full chart review including lab work, imaging, and vital signs were reviewed.     History of Present Illness   Jovi Aldana is a 77 year old male with past medical history significant for hypertension, diabetes, obesity, trigeminal neuralgia who recently was seen at urgency room on August 27 and diagnosed with parotiditis on clindamycin with concerns for concomitant parotid mass as well as hyponatremia who presents with ongoing hyponatremia.   Sodium at urgent care was 126 and currently today is 123.  Rest of her labs are fairly unremarkable with normal white count and hemoglobin 12.5 which is around his baseline.  It appears that he has previous episodes of hyponatremia with sodium level down to the 130s.  He was started on IV fluid and recommended for admission to the hospital.    Past Medical History    I have reviewed this patient's medical history and updated it with pertinent information if needed.   Past Medical History:   Diagnosis Date     Adrenal mass (H)      Arthritis      Complication of anesthesia     \"hard time waking up\" after vein " stripping     Coronary artery disease      Diabetes mellitus (H)      Gout      Heartburn      Hyperlipidemia      Hypertension      Mumps      PONV (postoperative nausea and vomiting)      Renal disease     stone     Sleep apnea     CPAP     Venous insufficiency        Past Surgical History   I have reviewed this patient's surgical history and updated it with pertinent information if needed.  Past Surgical History:   Procedure Laterality Date     COLONOSCOPY       COLONOSCOPY  5/15/2012    Procedure:COLONOSCOPY; COLONOSCOPY; Surgeon:ALICIA HART; Location: GI     CYSTOSCOPY N/A 8/11/2020    Procedure: (1) CYSTOSCOPY, exam under anesthesia, urethral dialation;  Surgeon: Saurav Mane MD;  Location: [facility] OR     ESOPHAGOSCOPY, GASTROSCOPY, DUODENOSCOPY (EGD), COMBINED N/A 5/2/2018    Procedure: COMBINED ESOPHAGOSCOPY, GASTROSCOPY, DUODENOSCOPY (EGD);  ESOPHAGOSCOPY, GASTROSCOPY, DUODENOSCOPY with biopsies, and esophogeal dilation. ;  Surgeon: Mic Gilman MD;  Location: [facility] OR     GENITOURINARY SURGERY  1989    lithrotripsy     LAPAROSCOPIC CHOLECYSTECTOMY N/A 8/11/2020    Procedure: (2) LAPAROSCOPIC CHOLECYSTECTOMY;  Surgeon: Floyd Smith MD;  Location: [facility] OR     ORTHOPEDIC SURGERY  1989    laminectomy L 4-5     ORTHOPEDIC SURGERY  1996    disectomy      VASCULAR SURGERY      vein stripping       Prior to Admission Medications   Prior to Admission Medications   Prescriptions Last Dose Informant Patient Reported? Taking?   Desonide Crea-Wound Dress Crea (DESONIL CREAM EX)  at PRN  Yes Yes   Sig: Externally apply 0.05 oz topically as needed Taro    JANUMET  MG tablet 9/2/2022 at x1, breakfast  No Yes   Sig: TAKE 1 TABLET BY MOUTH TWICE A DAY WITH MEALS   Magnesium 400 MG CAPS 9/1/2022 at Unknown time  Yes Yes   Sig: Take by mouth 2 times daily   PSYLLIUM HUSK PO 9/2/2022 at Unknown time  Yes Yes   Sig: Take 1,500 mg by mouth daily   Polyethylene Glycol 3350 (MIRALAX PO)  at PRN  Yes Yes   Sig: Take  1 capful by mouth Mixed in water once daily as needed.   VITAMIN D, CHOLECALCIFEROL, PO 9/2/2022 at Unknown time  Yes Yes   Sig: Take 2,000 Units by mouth 2 times daily   amLODIPine (NORVASC) 2.5 MG tablet 9/2/2022 at AM  No Yes   Sig: TAKE 1 TABLET BY MOUTH EVERY DAY   aspirin 81 MG tablet 9/1/2022 at Unknown time  Yes Yes   Sig: Take 81 mg by mouth At Bedtime   baclofen (LIORESAL) 10 MG tablet 9/1/2022 at HS  No Yes   Sig: Take 1 tablet (10 mg) by mouth daily   blood glucose (NO BRAND SPECIFIED) test strip   No No   Sig: Use to test blood sugar once daily or as directed.   blood glucose monitoring (NO BRAND SPECIFIED) meter device kit   No No   Sig: Use to test blood sugar one times daily or as directed.   carBAMazepine (TEGRETOL XR) 200 MG 12 hr tablet 9/2/2022 at x1, breakfast  No Yes   Sig: TAKE 1 TABLET BY MOUTH 6 TIMES DAILY   Patient taking differently: 400 mg at breakfast, 200 mg in the afternoon at 3PM, 400 mg at bedtime   cloNIDine (CATAPRES) 0.1 MG tablet 9/1/2022 at Unknown time  No Yes   Sig: Take 1 tablet (0.1mg) by mouth at dinnertime, and take 2 tablets (0.2mg) by mouth at bedtime.   clobetasol (TEMOVATE) 0.05 % ointment  at PRN  Yes Yes   Sig: Apply topically as needed    clotrimazole-betamethasone (LOTRISONE) 1-0.05 % external cream  at PRN  Yes Yes   Sig: APPLY TO AFFECTED AREAS OF BOTH FEET AS NEEDED   diphenhydrAMINE (BENADRYL) 25 MG tablet 9/1/2022 at Unknown time  Yes Yes   Sig: Take 25 mg by mouth 2 times daily At 3PM and bedtime   fexofenadine (ALLEGRA) 180 MG tablet 9/2/2022 at AM  Yes Yes   Sig: Take 180 mg by mouth daily   finasteride (PROSCAR) 5 MG tablet 9/2/2022 at AM  No Yes   Sig: Take 1 tablet (5 mg) by mouth daily   fluticasone (FLONASE) 50 MCG/ACT nasal spray  at PRN  Yes Yes   Sig: Spray 1 spray into both nostrils daily as needed    furosemide (LASIX) 20 MG tablet Not Taking at Unknown time  No No   Sig: TAKE 1 TABLET BY MOUTH EVERY DAY   Patient not taking: Reported on 9/2/2022  "  gabapentin (NEURONTIN) 300 MG capsule 9/2/2022 at x2, at lunch  No Yes   Sig: TAKE 1 CAPSULE BY MOUTH 4 TIMES DAILY.   ketoconazole (NIZORAL) 2 % external cream  at PRN  No Yes   Sig: APPLY TOPICALLY DAILY   levofloxacin (LEVAQUIN) 500 MG tablet 9/1/2022 at at 3PM  Yes Yes   Sig: Take 1 tablet by mouth daily For 10 days starting 8/31/22   lisinopril (ZESTRIL) 40 MG tablet 9/2/2022 at AM  No Yes   Sig: TAKE 1 TABLET BY MOUTH EVERY DAY   niacin (SLO-NIACIN) 250 MG TBCR CR tablet 9/1/2022 at Unknown time  Yes Yes   Sig: Take 250 mg by mouth daily    omeprazole (PRILOSEC) 40 MG DR capsule 9/2/2022 at AM  No Yes   Sig: TAKE 1 CAPSULE (40 MG) BY MOUTH 2 TIMES DAILY (BEFORE MEALS)   Patient taking differently: Take 40 mg by mouth daily   senna-docusate (SENOKOT-S/PERICOLACE) 8.6-50 MG tablet   Yes No   Sig: Take 2 tablets by mouth 2 times daily as needed    spironolactone (ALDACTONE) 25 MG tablet 9/2/2022 at AM  No Yes   Sig: TAKE 1 TABLET BY MOUTH EVERY DAY   vitamin E 400 UNITS TABS  at PRN  Yes Yes   Sig: Take 1 tablet by mouth daily as needed    zinc sulfate (ZINCATE) 220 (50 Zn) MG capsule 9/1/2022 at Unknown time  Yes Yes   Sig: Take 220 mg by mouth daily      Facility-Administered Medications: None     Allergies   Allergies   Allergen Reactions     Arthrotec      Atorvastatin      Muscle aches     Augmentin Diarrhea     Carvedilol      Patient had arthralgias, he attributed to carvedilol.     Celebrex [Celecoxib]      Stomach pain     Clindamycin Hives     Colestipol      Muscle pain, occurred in Jan 2011     Cozaar [Losartan]      Crestor [Rosuvastatin]      Gemfibrozil      Glucotrol [Glipizide]      Hmg-Coa-R Inhibitors      Muscle Pain     Ibuprofen      Bloody noses a couple of times on this medication.  Occurred in 12/2011     Nabumetone      Relafin - stomach pain     Oxycodone-Acetaminophen Other (See Comments)     Patient states it makes him\"loopy\"     Ozempic [Semaglutide]      trialed x several weeks, " extreme nausea.     Vioxx [Rofecoxib]      Zocor [Simvastatin]      Erythromycin Rash     Sulfabenzamide Rash     Zetia [Ezetimibe] Rash     Happened in 2006       Social History   I have reviewed this patient's social history and updated it with pertinent information if needed. Jovi Aldana  reports that he has never smoked. He has never used smokeless tobacco. He reports that he does not drink alcohol and does not use drugs.    Family History   I have reviewed this patient's family history and updated it with pertinent information if needed.   Family History   Problem Relation Age of Onset     Prostate Cancer Father      Coronary Artery Disease No family hx of        Review of Systems   The 10 point Review of Systems is negative other than noted in the HPI or here.     Physical Exam   Temp: 97.7  F (36.5  C) Temp src: Temporal BP: (!) 193/86 Pulse: 67   Resp: 18 SpO2: 97 % O2 Device: None (Room air)    Vital Signs with Ranges  Temp:  [97.7  F (36.5  C)] 97.7  F (36.5  C)  Pulse:  [67-73] 67  Resp:  [18] 18  BP: (193)/(85-86) 193/86  SpO2:  [97 %] 97 %  0 lbs 0 oz    Constitutional: Awake, alert,  no apparent distress.  Eyes: Conjunctiva and pupils examined and normal.  HEENT: Moist mucous membranes, normal dentition. Left parotid swollen,mold LND on the left   Respiratory: Clear to auscultation bilaterally, no crackles or wheezing.  Cardiovascular: Regular rate and rhythm, normal S1 and S2, and no murmur noted.  GI: Soft, non-distended, non-tender, bowel sounds present. No rebound tenderness or guarding.  Lymph/Hematologic: No anterior cervical or supraclavicular adenopathy.  Skin: No rashes, no cyanosis, chronic BLE edema.  Musculoskeletal: No deformities noted.  No erythema or tenderness. Moving all extremities.  Neurologic: No focal deficits noted. Speech is clear. Coordination and strength grossly normal.   Psychiatric: Appropriate affect.    Data   Data reviewed today:      Imaging:   Recent Results (from  the past 24 hour(s))   XR Chest 2 Views    Narrative    CHEST TWO VIEWS    9/2/2022 1:25 PM     HISTORY: Evaluate congestive heart failure.    COMPARISON: None available      Impression    IMPRESSION: PA and lateral views of the chest were obtained.  Cardiomediastinal silhouette is within normal limits. Mild basilar  pulmonary opacities, likely atelectasis. No significant pleural  effusion or pneumothorax.    BERTHA CAMACHO MD         SYSTEM ID:  [id number]       Recent Labs   Lab 09/02/22  1303 09/01/22  1622 08/29/22  0000   WBC 6.9  --  7.8   HGB 12.5*  --  12.6*   MCV 93  --  96.9     --  328   * 124.5* 127.6*   POTASSIUM 4.6 4.91 4.81   CHLORIDE 86* 88.6* 91.1*   CO2 29 30.1 27.9   BUN 6.7* 8  12.1 9  10.3   CR 0.54* 0.66 0.87   ANIONGAP 8  --   --    RULA 9.0 8.9 8.7   * 141* 191*           Rhea Gallagher PA-C  [facility] Medicine  September 2, 2022  Securely message with the Vocera Web Console (learn more here)  Text page via [employer] Paging/Directory

## 2022-09-03 NOTE — PLAN OF CARE
ROOM # 228    Living Situation (if not independent, order SW consult):Home  :  [facility] ([id number])    Activity level at baseline: AX1  Activity level on admit: AX1    Who will be transporting you at discharge: [facility]    Patient registered to observation; given Patient Bill of Rights; given the opportunity to ask questions about observation status and their plan of care.  Patient has been oriented to the observation room, bathroom and call light is in place.    Discussed discharge goals and expectations with patient/family.

## 2022-09-03 NOTE — PLAN OF CARE
"PRIMARY DIAGNOSIS: Hyponatremia and weakness   OUTPATIENT/OBSERVATION GOALS TO BE MET BEFORE DISCHARGE  1. Orthostatic performed: No    2. Tolerating PO medications: Yes    3. Return to near baseline physical activity: No    4. Cleared for discharge by consultants (if involved): No    Discharge Planner Nurse   Safe discharge environment identified: Yes  Barriers to discharge: Yes       Entered by: Linda Singer RN 09/03/2022 6:46 AM      Pt alert & oriented X 4, reported SOB with exacerbation, abdominal sounds present X 4, Large BM 9/2/22 HS. Recheck /69. Continent with bowel and bladder. IV saline lock. Skin rash all over the body due to allergy from Clindamycin prior to admission. Pt denies itching. Pt reported pain on left side jaw due to Parotitis. PRN Tylenol given. 3+ edema and redness on bilateral lower extremities. Pt ambulate to the bathroom with assist of one. Regular diet. Pt slept using home CPAP  most of the night.    BP (!) 142/69 (BP Location: Left arm)   Pulse 71   Temp 97.6  F (36.4  C) (Oral)   Resp 16   Ht 1.854 m (6' 1\")   Wt (!) 168.6 kg (371 lb 11.2 oz)   SpO2 96%   BMI 49.04 kg/m      Please review provider order for any additional goals.   Nurse to notify provider when observation goals have been met and patient is ready for discharge.    "

## 2022-09-03 NOTE — DISCHARGE SUMMARY
[facility]  Hospitalist Discharge Summary      Date of Admission:  9/2/2022  Date of Discharge:  9/3/2022 12:50 PM  Discharging Provider: Alfreda Ch PA-C  Discharge Service: Hospitalist Service    Discharge Diagnoses   Subacute Hyponatremia   Hypertensive Urgency, Hx of HTN   Hx of Parotiditis     Follow-ups Needed After Discharge   Follow-up Appointments     Follow-up and recommended labs and tests       1. Follow up with primary care provider, Josafat Marroquin, within   7 days for hospital follow- up.  The following labs/tests are recommended:   BMP.  Discussed lifting fluid restriction if sodium level is stabilizing   with resumption of diuretics.   2. Further work up recommended for low sodium recommended as an outpatient   if fails to return to normal. Nephrology referral placed at discharge.           Unresulted Labs Ordered in the Past 30 Days of this Admission     No orders found for last 31 day(s).      These results will be followed up by n/a.    Discharge Disposition   Discharged to home  Condition at discharge: Stable      Hospital Course   Jovi Aldana is a 77 year old male with past medical history significant for hypertension, diabetes, obesity, trigeminal neuralgia who recently was seen at urgency room on August 27 and diagnosed with parotiditis as well as hyponatremia who presents with ongoing hyponatremia and c/o weakness, fatigue, dizziness.   Sodium at urgent care was 126 and currently today is 123.  Rest of her labs are fairly unremarkable with normal white count and hemoglobin 12.5 which is around his baseline.  It appears that he has previous episodes of hyponatremia with sodium level down to the 130s.  He was started on IV fluid and recommended for admission to the hospital. He was then placed on a fluid restriction with improvement.       Hyponatremia does preexist current parotiditis issue.  His sodium when checked about a year ago was trending  downward at 130. Worsened after fluids, increased fluid intake.  -Asymptomatic.   -Improved on fluid restriction overnight up to 129. As he is feeling well today he will return home. He is to restart his lasix, spironolactone. Will be continued on fluid restriction with recheck pending via PCP early next week.   - Further work up is recommended if fails to normalize, ie fena, ct chest.. Nephrology referral placed   - Has been on a salt restricted diet historically as recommended by PCP for hx of HTN    Hypertensive Urgency  Patient has history of hypertension.  He has been off spironolactone and Lasix.  He was hypertensive in the emergency department and this was likely secondary due to taking a scheduled Benadryl, pain from parotiditis, lack of chronic medications. His BP medications are now resumed.       Parotiditis on Levaquin  -  Overall improving, following with Dr. Nieves ENT   -  Cont Levaquin and tylenol for pain      Hypertension  Blood pressure elevated with systolic in the 190s  Has been difficult to control per outpt notes  -Resume clonidine, Norvasc and lisinopril  -We will add as needed hydralazine     Diabetes mellitus  -Blood glucose stable, last hemoglobin A1c 6.9  -Okay for mod control diet  - Cont Janumet, PRN sliding scale insulin        Consultations This Hospital Stay   None    Code Status   Full Code    Time Spent on this Encounter   I, Alfreda Ch PA-C, personally saw the patient today and spent greater than 30 minutes discharging this patient.       Alfreda Ch PA-C  [facility] OBSERVATION DEPT  [address] [place] [address]  Phone: [phone]  ______________________________________________________________________  Interval History:  On fluid restriction, compliant.  Has any headache, nausea or vomiting.  His weakness did improve when he did not take Benadryl.  We discussed that he will not take baclofen if he is feeling weak at home.  He reports  that he is feeling stronger today and was able to ambulate with his walker.  Denies any chest pain or shortness of breath.  He reports that his edema has somewhat increased with previous hold of his diuretics.  Denies any itching from rash.  Reports that he has been adding salt to his diet.  Would like to return home today.    His wife is at the bedside, Estela.  She is very knowledgeable in his medical history and has been updated with the patient's plan of care.      Physical Exam   Vital Signs: Temp: 98.5  F (36.9  C) Temp src: Oral BP: (!) 163/72 Pulse: 72   Resp: 16 SpO2: 97 % O2 Device: None (Room air)    Weight: 360 lbs 6.4 oz  Constitutional: Awake, alert,  no apparent distress.  Eyes: Conjunctiva and pupils examined and normal.  HEENT: Moist mucous membranes, normal dentition. Left parotid swollen,mold LND on the left   Respiratory: Clear to auscultation bilaterally, no crackles or wheezing.  Cardiovascular: Regular rate and rhythm, normal S1 and S2, and no murmur noted.  GI: Soft, non-distended, non-tender, bowel sounds present. No rebound tenderness or guarding.  Lymph/Hematologic: No anterior cervical or supraclavicular adenopathy.  Skin: No rashes, no cyanosis, chronic BLE edema to upper thighs in stockings.   Musculoskeletal: No deformities noted.  No erythema or tenderness. Moving all extremities.  Neurologic: No focal deficits noted. Speech is clear. Coordination and strength grossly normal.   Psychiatric: Appropriate affect.       Primary Care Physician   Josafat Marroquin    Discharge Orders      Adult Nephrology  Referral      Reason for your hospital stay    Hyponatremia     Follow-up and recommended labs and tests     1. Follow up with primary care provider, Josafat Marroquin, within 7 days for hospital follow- up.  The following labs/tests are recommended: BMP.  Discussed lifting fluid restriction if sodium level is stabilizing with resumption of diuretics.   2. Further work  up recommended for low sodium recommended as an outpatient if fails to return to normal. Nephrology referral placed at discharge.     Activity    Your activity upon discharge: activity as tolerated     When to contact your care team    Call your primary care doctor if you have any of the following: temperature greater than 101 F, worsening shortness of breath, increased swelling, worsening pain, new or unrelenting diarrhea, or any other concerning symptoms. Call 911 or go to the emergency room if you need immediate assistance.     Discharge Instructions    Sodium level improved after fluids/fluid intake restriction to 129.    STOP taking benadryl on a schedule unless severe itching - try ice as well  Restart Diuretics  Do not restrict salt in food.  Need further work up if fails to improve. Discuss whether to stay on fluid restriction with Dr. Marroquin after lab follow up next week     Diet    Follow this diet upon discharge: Orders Placed This Encounter      Regular Diet Adult, under 1500 ml all fluid/day       Significant Results and Procedures   Results for orders placed or performed during the hospital encounter of 09/02/22   XR Chest 2 Views    Narrative    CHEST TWO VIEWS    9/2/2022 1:25 PM     HISTORY: Evaluate congestive heart failure.    COMPARISON: None available      Impression    IMPRESSION: PA and lateral views of the chest were obtained.  Cardiomediastinal silhouette is within normal limits. Mild basilar  pulmonary opacities, likely atelectasis. No significant pleural  effusion or pneumothorax.    BERTHA CAMACHO MD         SYSTEM ID:  [id number]       Discharge Medications   Current Discharge Medication List      CONTINUE these medications which have NOT CHANGED    Details   amLODIPine (NORVASC) 2.5 MG tablet TAKE 1 TABLET BY MOUTH EVERY DAY  Qty: 90 tablet, Refills: 0    Associated Diagnoses: Essential hypertension      aspirin 81 MG tablet Take 81 mg by mouth At Bedtime      baclofen (LIORESAL) 10 MG  tablet Take 1 tablet (10 mg) by mouth daily  Qty: 180 tablet, Refills: 1    Associated Diagnoses: Trigeminal neuralgia      carBAMazepine (TEGRETOL XR) 200 MG 12 hr tablet TAKE 1 TABLET BY MOUTH 6 TIMES DAILY  Qty: 540 tablet, Refills: 0    Associated Diagnoses: Trigeminal neuralgia      clobetasol (TEMOVATE) 0.05 % ointment Apply topically as needed       cloNIDine (CATAPRES) 0.1 MG tablet Take 1 tablet (0.1mg) by mouth at dinnertime, and take 2 tablets (0.2mg) by mouth at bedtime.  Qty: 270 tablet, Refills: 0    Associated Diagnoses: Essential hypertension      clotrimazole-betamethasone (LOTRISONE) 1-0.05 % external cream APPLY TO AFFECTED AREAS OF BOTH FEET AS NEEDED      Desonide Crea-Wound Dress Crea (DESONIL CREAM EX) Externally apply 0.05 oz topically as needed Taro       fexofenadine (ALLEGRA) 180 MG tablet Take 180 mg by mouth daily      finasteride (PROSCAR) 5 MG tablet Take 1 tablet (5 mg) by mouth daily  Qty: 90 tablet, Refills: 3    Associated Diagnoses: Benign prostatic hyperplasia without lower urinary tract symptoms      fluticasone (FLONASE) 50 MCG/ACT nasal spray Spray 1 spray into both nostrils daily as needed       gabapentin (NEURONTIN) 300 MG capsule TAKE 1 CAPSULE BY MOUTH 4 TIMES DAILY.  Qty: 360 capsule, Refills: 1    Associated Diagnoses: Type 2 diabetes mellitus with complication, without long-term current use of insulin (H)      JANUMET  MG tablet TAKE 1 TABLET BY MOUTH TWICE A DAY WITH MEALS  Qty: 180 tablet, Refills: 0    Associated Diagnoses: Type 2 diabetes mellitus with complication, without long-term current use of insulin (H)      ketoconazole (NIZORAL) 2 % external cream APPLY TOPICALLY DAILY  Qty: 30 g, Refills: 3    Associated Diagnoses: Tinea corporis      levofloxacin (LEVAQUIN) 500 MG tablet Take 1 tablet by mouth daily For 10 days starting 8/31/22      lisinopril (ZESTRIL) 40 MG tablet TAKE 1 TABLET BY MOUTH EVERY DAY  Qty: 90 tablet, Refills: 0    Associated  Diagnoses: Essential hypertension      Magnesium 400 MG CAPS Take by mouth 2 times daily      niacin (SLO-NIACIN) 250 MG TBCR CR tablet Take 250 mg by mouth daily       omeprazole (PRILOSEC) 40 MG DR capsule TAKE 1 CAPSULE (40 MG) BY MOUTH 2 TIMES DAILY (BEFORE MEALS)  Qty: 180 capsule, Refills: 1    Associated Diagnoses: Gastroesophageal reflux disease with esophagitis without hemorrhage      Polyethylene Glycol 3350 (MIRALAX PO) Take 1 capful by mouth Mixed in water once daily as needed.      PSYLLIUM HUSK PO Take 1,500 mg by mouth daily      spironolactone (ALDACTONE) 25 MG tablet TAKE 1 TABLET BY MOUTH EVERY DAY  Qty: 90 tablet, Refills: 0    Associated Diagnoses: Edema, unspecified type      VITAMIN D, CHOLECALCIFEROL, PO Take 2,000 Units by mouth 2 times daily      vitamin E 400 UNITS TABS Take 1 tablet by mouth daily as needed       zinc sulfate (ZINCATE) 220 (50 Zn) MG capsule Take 220 mg by mouth daily      blood glucose (NO BRAND SPECIFIED) test strip Use to test blood sugar once daily or as directed.  Qty: 100 strip, Refills: 1    Associated Diagnoses: Type 2 diabetes mellitus with complication, without long-term current use of insulin (H)      blood glucose monitoring (NO BRAND SPECIFIED) meter device kit Use to test blood sugar one times daily or as directed.  Qty: 1 kit, Refills: 0    Associated Diagnoses: Type 2 diabetes mellitus with complication, without long-term current use of insulin (H)      furosemide (LASIX) 20 MG tablet TAKE 1 TABLET BY MOUTH EVERY DAY  Qty: 90 tablet, Refills: 0    Associated Diagnoses: Edema, unspecified type      senna-docusate (SENOKOT-S/PERICOLACE) 8.6-50 MG tablet Take 2 tablets by mouth 2 times daily as needed          STOP taking these medications       diphenhydrAMINE (BENADRYL) 25 MG tablet Comments:   Reason for Stopping:             Allergies   Allergies   Allergen Reactions     Arthrotec      Atorvastatin      Muscle aches     Augmentin Diarrhea     Carvedilol   "    Patient had arthralgias, he attributed to carvedilol.     Celebrex [Celecoxib]      Stomach pain     Clindamycin Hives     Colestipol      Muscle pain, occurred in Jan 2011     Cozaar [Losartan]      Crestor [Rosuvastatin]      Gemfibrozil      Glucotrol [Glipizide]      Hmg-Coa-R Inhibitors      Muscle Pain     Ibuprofen      Bloody noses a couple of times on this medication.  Occurred in 12/2011     Nabumetone      Relafin - stomach pain     Oxycodone-Acetaminophen Other (See Comments)     Patient states it makes him\"loopy\"     Ozempic [Semaglutide]      trialed x several weeks, extreme nausea.     Vioxx [Rofecoxib]      Zocor [Simvastatin]      Erythromycin Rash     Sulfabenzamide Rash     Zetia [Ezetimibe] Rash     Happened in 2006     "

## 2022-09-03 NOTE — PLAN OF CARE
Patient's After Visit Summary was reviewed with patient and spouse.   Patient verbalized understanding of After Visit Summary, recommended follow up and was given an opportunity to ask questions.   Discharge medications sent home with patient/family: No  Discharged with spouse: Florentino

## 2022-09-03 NOTE — PLAN OF CARE
"PRIMARY DIAGNOSIS: Hyponatremia and weakness   OUTPATIENT/OBSERVATION GOALS TO BE MET BEFORE DISCHARGE  1. Orthostatic performed: No    2. Tolerating PO medications: Yes    3. Return to near baseline physical activity: No    4. Cleared for discharge by consultants (if involved): No    Discharge Planner Nurse   Safe discharge environment identified: Yes  Barriers to discharge: Yes       Entered by: Linda Singer RN 09/03/2022 12:45 AM      Pt alert & oriented X 4, reported SOB with exacerbation, abdominal sounds present X 4, Large BM 9/2/22 HS. BP reading 187/86, PRN Hydralazine given. Continent with bowel and bladder. IV saline lock. Skin rash all over the body due to allergy from Clindamycin prior to admission. Pt denies itching. Pt reported pain on left side jaw due to Parotitis. PRN Tylenol given. 3+ edema and redness on bilateral lower extremities. Pt ambulate to the bathroom with assist of one. Regular diet. Pt slept using home CPAP  most of the night.    BP (!) 142/69 (BP Location: Left arm)   Pulse 71   Temp 97.6  F (36.4  C) (Oral)   Resp 16   Ht 1.854 m (6' 1\")   Wt (!) 168.6 kg (371 lb 11.2 oz)   SpO2 96%   BMI 49.04 kg/m      Please review provider order for any additional goals.   Nurse to notify provider when observation goals have been met and patient is ready for discharge.    "

## 2022-09-05 DIAGNOSIS — G50.0 TRIGEMINAL NEURALGIA: ICD-10-CM

## 2022-09-06 ENCOUNTER — CARE COORDINATION (OUTPATIENT)
Dept: FAMILY MEDICINE | Facility: CLINIC | Age: 77
End: 2022-09-06

## 2022-09-06 ENCOUNTER — TELEPHONE (OUTPATIENT)
Dept: FAMILY MEDICINE | Facility: CLINIC | Age: 77
End: 2022-09-06

## 2022-09-06 ENCOUNTER — OFFICE VISIT (OUTPATIENT)
Dept: FAMILY MEDICINE | Facility: CLINIC | Age: 77
End: 2022-09-06

## 2022-09-06 VITALS
SYSTOLIC BLOOD PRESSURE: 162 MMHG | OXYGEN SATURATION: 94 % | TEMPERATURE: 98.5 F | DIASTOLIC BLOOD PRESSURE: 63 MMHG | HEIGHT: 73 IN | BODY MASS INDEX: 41.75 KG/M2 | HEART RATE: 81 BPM | WEIGHT: 315 LBS

## 2022-09-06 DIAGNOSIS — E87.1 HYPONATREMIA: ICD-10-CM

## 2022-09-06 DIAGNOSIS — Z09 HOSPITAL DISCHARGE FOLLOW-UP: Primary | ICD-10-CM

## 2022-09-06 LAB
BUN SERPL-MCNC: 10 MG/DL (ref 7–25)
BUN/CREATININE RATIO: 12.2 (ref 6–22)
CALCIUM SERPL-MCNC: 9 MG/DL (ref 8.6–10.3)
CHLORIDE SERPLBLD-SCNC: 91.6 MMOL/L (ref 98–110)
CO2 SERPL-SCNC: 30.1 MMOL/L (ref 20–32)
CREAT SERPL-MCNC: 0.82 MG/DL (ref 0.6–1.3)
GLUCOSE SERPL-MCNC: 155 MG/DL (ref 60–99)
POTASSIUM SERPL-SCNC: 4.79 MMOL/L (ref 3.5–5.3)
SODIUM SERPL-SCNC: 129.3 MMOL/L (ref 135–146)

## 2022-09-06 PROCEDURE — 80048 BASIC METABOLIC PNL TOTAL CA: CPT | Performed by: PHYSICIAN ASSISTANT

## 2022-09-06 PROCEDURE — 99214 OFFICE O/P EST MOD 30 MIN: CPT | Performed by: PHYSICIAN ASSISTANT

## 2022-09-06 PROCEDURE — 36415 COLL VENOUS BLD VENIPUNCTURE: CPT | Performed by: PHYSICIAN ASSISTANT

## 2022-09-06 RX ORDER — CARBAMAZEPINE 200 MG/1
TABLET, EXTENDED RELEASE ORAL
Qty: 540 TABLET | Refills: 0 | Status: SHIPPED | OUTPATIENT
Start: 2022-09-06 | End: 2023-02-14

## 2022-09-06 NOTE — PROGRESS NOTES
"  Assessment & Plan     Hospital discharge follow-up-patient is doing better vs discharge. Continues on fluid restriction, still a little confusion but is resolving per patient and spouse. Will continue fluid restriction pending labs  Continue medications without change  Fluid restriction continues pending labs  - VENOUS COLLECTION  - Basic Metabolic Panel (BFP)    Hyponatremia-labs pending  See above  - VENOUS COLLECTION  - Basic Metabolic Panel (BFP)         Follow up pending labs    No follow-ups on file.    Atul Silver PA-C  [facility]    Subjective   Peterson is a 77 year old, presenting for the following health issues:  Hospital F/U      [facility]       Hospital Follow-up Visit:    Hospital/Nursing Home/IP Rehab Facility: [facility]  Date of Admission: 9/2/22  Date of Discharge: 9/3/22  Reason(s) for Admission: hyponatremia    Was your hospitalization related to COVID-19? No   Problems taking medications regularly:  None  Medication changes since discharge: None  Problems adhering to non-medication therapy:  None    Summary of hospitalization:  [facility] discharge summary reviewed  Diagnostic Tests/Treatments reviewed.  Follow up needed: BMP, fluid restriction  Other Healthcare Providers Involved in Patient s Care:         nephrology  Update since discharge: improved.     Having some mild confusion with his low Na. Wife states he is doing much better vs when he was discharged.       Fluid restriction continues. Was told to restart diuretics, so is back on Lasix.     Review of Systems   Constitutional, HEENT, cardiovascular, pulmonary, gi and gu systems are negative, except as otherwise noted.      Objective    BP (!) 162/63 (BP Location: Right arm, Patient Position: Sitting, Cuff Size: Adult Large)   Pulse 81   Temp 98.5  F (36.9  C) (Oral)   Ht 1.854 m (6' 1\")   Wt (!) 161 kg (355 lb)   SpO2 94%   BMI 46.84 kg/m    Body mass index is 46.84 " kg/m .  Physical Exam   GENERAL: healthy, alert and no distress  HENT: swelling greatly reduced on L face vs last visit  NECK: no adenopathy, no asymmetry, masses, or scars and thyroid normal to palpation  RESP: lungs clear to auscultation - no rales, rhonchi or wheezes  CV: regular rate and rhythm, normal S1 S2, no S3 or S4, no murmur, click or rub, no peripheral edema and peripheral pulses strong  ABDOMEN: soft, nontender, no hepatosplenomegaly, no masses and bowel sounds normal  MS: no gross musculoskeletal defects noted, edema noted to knees RUSH  SKIN: no suspicious lesions or rashes  NEURO: Normal strength and tone, mentation intact and speech normal

## 2022-09-06 NOTE — PROGRESS NOTES
Care Coordination Initial Assessment    The patient was admitted into [facility] on 9/2/22 for hyponatremia. He was discharged on 9/3/2022 with instructions to follow up with PCP.    PCP: Josafat Marroquin    Referral Source:  ED/IP List    Utilization:   Last PCP Appt.: 9/1/22    Health Maintenance Reviewed: Yes    Current Medical Health Concerns:   Please review patients current medical problem list.    Patient/Caregiver Understanding: yes    Medication Management:   Method of Taking:  Family set up in med box  Patient has understanding of regimen and is adherent:  Yes  Medications Reviewed: Yes    Functional Status:   Transportation: Family provides transportation  PCA/Home Care:   No    Current Behavioral Health Concerns:   Patient doing alright and does not feel he has any concerns with this.    Patient/Caregiver Understanding:  yes    Psychosocial:  Support System Patient/Caregivers: has a fantastic family that is able to help him  Living Situation:  Lives at home with wife     Gaps:    NA    Resources Given:    No resources were needed by the patient at this time but he may bring some things up with Atul at his appt.     Plan:   The patient was able to call and get scheduled for his hospital follow up with Atul today. He will further discuss things at the appointment and said that he is going to need some labs to be rechecked. I informed him that we will be able to get this done at the appt. The patient had no further questions or concerns at this time.

## 2022-09-07 ENCOUNTER — MYC MEDICAL ADVICE (OUTPATIENT)
Dept: FAMILY MEDICINE | Facility: CLINIC | Age: 77
End: 2022-09-07

## 2022-09-07 DIAGNOSIS — E87.1 HYPONATREMIA: Primary | ICD-10-CM

## 2022-09-07 NOTE — TELEPHONE ENCOUNTER
Patient & patients wife called asking for a Nephrology Referral to     [facility]   [address]  [address]  [facility] [address]  [phone] - appt line  [phone] - fax    Patient is scheduled 9/26/2022 at 11:00am with [facility] -- Dr. Luis A Calloway    Patient was advised when he was inpatient 9/2/2022 at [facility] he needs to see/follow up with Nephrology.     Please complete/sign the pending Nephrology Referral. I will fax the referral with today's office note with Atul Silver PA-C to [facility].     Thank you

## 2022-09-12 ENCOUNTER — TELEPHONE (OUTPATIENT)
Dept: FAMILY MEDICINE | Facility: CLINIC | Age: 77
End: 2022-09-12

## 2022-09-12 DIAGNOSIS — E87.1 HYPONATREMIA: Primary | ICD-10-CM

## 2022-09-12 LAB
BUN SERPL-MCNC: 8 MG/DL (ref 7–25)
BUN/CREATININE RATIO: 9.9 (ref 6–22)
CALCIUM SERPL-MCNC: 9.2 MG/DL (ref 8.6–10.3)
CHLORIDE SERPLBLD-SCNC: 90.7 MMOL/L (ref 98–110)
CO2 SERPL-SCNC: 31.8 MMOL/L (ref 20–32)
CREAT SERPL-MCNC: 0.81 MG/DL (ref 0.6–1.3)
GLUCOSE SERPL-MCNC: 197 MG/DL (ref 60–99)
POTASSIUM SERPL-SCNC: 4.8 MMOL/L (ref 3.5–5.3)
SODIUM SERPL-SCNC: 128.9 MMOL/L (ref 135–146)

## 2022-09-12 PROCEDURE — 36415 COLL VENOUS BLD VENIPUNCTURE: CPT | Performed by: FAMILY MEDICINE

## 2022-09-12 PROCEDURE — 80048 BASIC METABOLIC PNL TOTAL CA: CPT | Performed by: FAMILY MEDICINE

## 2022-09-12 NOTE — TELEPHONE ENCOUNTER
patient called the lab phone line and left the following request.    Pt is going to see you tomorrow for an office visit   Pt would like to come in and have a bmp done today so you have the results tomorrow  Please put in order and Send back to Me  Thank you   Alpa  [phone] (home)     Call wifes cell

## 2022-09-13 ENCOUNTER — OFFICE VISIT (OUTPATIENT)
Dept: FAMILY MEDICINE | Facility: CLINIC | Age: 77
End: 2022-09-13

## 2022-09-13 VITALS
SYSTOLIC BLOOD PRESSURE: 152 MMHG | RESPIRATION RATE: 20 BRPM | HEART RATE: 73 BPM | WEIGHT: 315 LBS | BODY MASS INDEX: 45.91 KG/M2 | DIASTOLIC BLOOD PRESSURE: 68 MMHG

## 2022-09-13 DIAGNOSIS — E87.1 HYPONATREMIA: Primary | ICD-10-CM

## 2022-09-13 DIAGNOSIS — K11.20 PAROTITIS: ICD-10-CM

## 2022-09-13 PROCEDURE — 99213 OFFICE O/P EST LOW 20 MIN: CPT | Performed by: FAMILY MEDICINE

## 2022-09-13 NOTE — PROGRESS NOTES
"SUBJECTIVE:  Jovi Aldana, a 77 year old male scheduled an appointment to discuss the following issues:     Hyponatremia  Parotitis  Pt on fluid restriction- doing OK, has appt with nephrology 9/26, was put back on furosemide when he left hospital    MTM suggested carbamazepine may lead to hyponatremia    Pt feels parotitis improving slowly    Medical, social, surgical, and family histories reviewed.    Patient Active Problem List   Diagnosis     Trigeminal neuralgia     Primary hypertension     Type 2 diabetes mellitus with complication, without long-term current use of insulin (H)     Arthritis     Sleep apnea     Hyperlipidemia     Venous insufficiency     Coronary artery disease     Adrenal mass (H)     Health Care Home     Morbid obesity (H)     Supraventricular tachycardia (H)     Cholecystitis     Basal cell carcinoma of lip     Cerebrovascular accident (CVA) due to occlusion of basilar artery (H)     Chest pain     Fibromyositis     Hyperlipidemia type II     Kidney stones     Multiple lung nodules     Myofascial pain     Neck pain     Arthralgia of temporomandibular joint     Hyponatremia     Past Medical History:   Diagnosis Date     Adrenal mass (H)      Arthritis      Complication of anesthesia     \"hard time waking up\" after vein stripping     Coronary artery disease      Diabetes mellitus (H)      Gout      Heartburn      Hyperlipidemia      Hypertension      Mumps      PONV (postoperative nausea and vomiting)      Renal disease     stone     Sleep apnea     CPAP     Venous insufficiency      Family History   Problem Relation Age of Onset     Prostate Cancer Father      Coronary Artery Disease No family hx of      Social History     Socioeconomic History     Marital status:      Spouse name: Not on file     Number of children: Not on file     Years of education: Not on file     Highest education level: Not on file   Occupational History     Not on file   Tobacco Use     Smoking status: Never " Smoker     Smokeless tobacco: Never Used   Substance and Sexual Activity     Alcohol use: No     Drug use: Never     Sexual activity: Not on file   Other Topics Concern     Parent/sibling w/ CABG, MI or angioplasty before 65F 55M? Not Asked      Service Not Asked     Blood Transfusions Not Asked     Caffeine Concern Not Asked     Occupational Exposure Not Asked     Hobby Hazards Not Asked     Sleep Concern Not Asked     Stress Concern Not Asked     Weight Concern Not Asked     Special Diet No     Back Care Not Asked     Exercise Yes     Bike Helmet Not Asked     Seat Belt Not Asked     Self-Exams Not Asked   Social History Narrative     Not on file     Social Determinants of Health     Financial Resource Strain: Not on file   Food Insecurity: Not on file   Transportation Needs: Not on file   Physical Activity: Not on file   Stress: Not on file   Social Connections: Not on file   Intimate Partner Violence: Not on file   Housing Stability: Not on file     Past Surgical History:   Procedure Laterality Date     COLONOSCOPY       COLONOSCOPY  5/15/2012    Procedure:COLONOSCOPY; COLONOSCOPY; Surgeon:ALICIA HART; Location: GI     CYSTOSCOPY N/A 8/11/2020    Procedure: (1) CYSTOSCOPY, exam under anesthesia, urethral dialation;  Surgeon: Saurav Mane MD;  Location:  OR     ESOPHAGOSCOPY, GASTROSCOPY, DUODENOSCOPY (EGD), COMBINED N/A 5/2/2018    Procedure: COMBINED ESOPHAGOSCOPY, GASTROSCOPY, DUODENOSCOPY (EGD);  ESOPHAGOSCOPY, GASTROSCOPY, DUODENOSCOPY with biopsies, and esophogeal dilation. ;  Surgeon: Mic Gilman MD;  Location:  OR     GENITOURINARY SURGERY  1989    lithrotripsy     LAPAROSCOPIC CHOLECYSTECTOMY N/A 8/11/2020    Procedure: (2) LAPAROSCOPIC CHOLECYSTECTOMY;  Surgeon: Floyd Smith MD;  Location:  OR     ORTHOPEDIC SURGERY  1989    laminectomy L 4-5     ORTHOPEDIC SURGERY  1996    disectomy      VASCULAR SURGERY      vein stripping     amLODIPine (NORVASC) 2.5 MG tablet, TAKE  1 TABLET BY MOUTH EVERY DAY  aspirin 81 MG tablet, Take 81 mg by mouth At Bedtime  baclofen (LIORESAL) 10 MG tablet, Take 1 tablet (10 mg) by mouth daily  blood glucose (NO BRAND SPECIFIED) test strip, Use to test blood sugar once daily or as directed.  blood glucose monitoring (NO BRAND SPECIFIED) meter device kit, Use to test blood sugar one times daily or as directed.  carBAMazepine (TEGRETOL XR) 200 MG 12 hr tablet, TAKE 1 TABLET BY MOUTH 6 TIMES DAILY  clobetasol (TEMOVATE) 0.05 % ointment, Apply topically as needed   cloNIDine (CATAPRES) 0.1 MG tablet, Take 1 tablet (0.1mg) by mouth at dinnertime, and take 2 tablets (0.2mg) by mouth at bedtime.  clotrimazole-betamethasone (LOTRISONE) 1-0.05 % external cream, APPLY TO AFFECTED AREAS OF BOTH FEET AS NEEDED  Desonide Crea-Wound Dress Crea (DESONIL CREAM EX), Externally apply 0.05 oz topically as needed Taro   fexofenadine (ALLEGRA) 180 MG tablet, Take 180 mg by mouth daily  finasteride (PROSCAR) 5 MG tablet, Take 1 tablet (5 mg) by mouth daily  fluticasone (FLONASE) 50 MCG/ACT nasal spray, Spray 1 spray into both nostrils daily as needed   furosemide (LASIX) 20 MG tablet, TAKE 1 TABLET BY MOUTH EVERY DAY  gabapentin (NEURONTIN) 300 MG capsule, TAKE 1 CAPSULE BY MOUTH 4 TIMES DAILY.  JANUMET  MG tablet, TAKE 1 TABLET BY MOUTH TWICE A DAY WITH MEALS  ketoconazole (NIZORAL) 2 % external cream, APPLY TOPICALLY DAILY  levofloxacin (LEVAQUIN) 500 MG tablet, Take 1 tablet by mouth daily For 10 days starting 8/31/22  lisinopril (ZESTRIL) 40 MG tablet, TAKE 1 TABLET BY MOUTH EVERY DAY  Magnesium 400 MG CAPS, Take by mouth 2 times daily  niacin (SLO-NIACIN) 250 MG TBCR CR tablet, Take 250 mg by mouth daily   omeprazole (PRILOSEC) 40 MG DR capsule, TAKE 1 CAPSULE (40 MG) BY MOUTH 2 TIMES DAILY (BEFORE MEALS) (Patient taking differently: Take 40 mg by mouth daily)  Polyethylene Glycol 3350 (MIRALAX PO), Take 1 capful by mouth Mixed in water once daily as  needed.  PSYLLIUM HUSK PO, Take 1,500 mg by mouth daily  senna-docusate (SENOKOT-S/PERICOLACE) 8.6-50 MG tablet, Take 2 tablets by mouth 2 times daily as needed   spironolactone (ALDACTONE) 25 MG tablet, TAKE 1 TABLET BY MOUTH EVERY DAY  VITAMIN D, CHOLECALCIFEROL, PO, Take 2,000 Units by mouth 2 times daily  vitamin E 400 UNITS TABS, Take 1 tablet by mouth daily as needed   zinc sulfate (ZINCATE) 220 (50 Zn) MG capsule, Take 220 mg by mouth daily    No current facility-administered medications on file prior to visit.       Allergies: Arthrotec, Atorvastatin, Augmentin, Carvedilol, Celebrex [celecoxib], Clindamycin, Colestipol, Cozaar [losartan], Crestor [rosuvastatin], Gemfibrozil, Glucotrol [glipizide], Hmg-coa-r inhibitors, Ibuprofen, Nabumetone, Oxycodone-acetaminophen, Ozempic [semaglutide], Vioxx [rofecoxib], Zocor [simvastatin], Erythromycin, Sulfabenzamide, and Zetia [ezetimibe]    Immunization History   Administered Date(s) Administered     Influenza (High Dose) 3 valent vaccine 11/12/2014, 10/28/2015, 11/04/2016, 11/20/2017, 11/08/2018     Influenza (IIV3) PF 10/09/2009, 10/15/2010, 11/30/2011, 10/08/2012, 10/14/2013     Influenza Quad, Recombinant, pf(RIV4) (Flublok) 10/31/2019     Influenza Vaccine IM Ages 6-35 Months 4 Valent (PF) 10/16/2012     Influenza Vaccine, 6+MO IM (QUADRIVALENT W/PRESERVATIVES) 11/20/2017, 11/08/2018     Influenza, Quad, High Dose, Pf, 65yr+ (Fluzone HD) 11/10/2020, 12/06/2021     Pneumo Conj 13-V (2010&after) 08/28/2001     Pneumococcal 23 valent 02/07/2001, 06/17/2009     TDAP Vaccine (Boostrix) 06/09/2008     Tdap (Adacel,Boostrix) 05/17/2022     Zoster vaccine recombinant adjuvanted (SHINGRIX) 05/17/2022     Zoster vaccine, live 03/17/2007        ROS:  CONSTITUTIONAL: NEGATIVE for fever, chills  EYES: NEGATIVE for vision changes   RESP: NEGATIVE for significant cough or SOB  CV: NEGATIVE for chest pain, palpitations     OBJECTIVE:  BP (!) 152/68 (BP Location: Right arm,  Patient Position: Chair, Cuff Size: Adult Large)   Pulse 73   Resp 20   Wt (!) 157.9 kg (348 lb)   BMI 45.91 kg/m    EXAM:  GENERAL APPEARANCE: healthy, alert and no distress  HENT: ear canals and TM's normal and nose and mouth without ulcers or lesions  HENT: left parotid   RESP: lungs clear to auscultation - no rales, rhonchi or wheezes  CV: regular rates and rhythm, normal S1 S2, no S3 or S4 and no murmur, click or rub -  ABDOMEN:  soft, nontender, no HSM or masses and bowel sounds normal    ASSESSMENT/PLAN:  (E87.1) Hyponatremia  (primary encounter diagnosis)  Comment: stable based on labs today, unclear etiology  Plan: continue fluid restriction, recheck next week, see nephrology as planned 2 weeks    (K11.20) Parotitis  Comment: slowly improving per pt, finished abx  Plan:    t

## 2022-09-13 NOTE — NURSING NOTE
Jovi Aldana is here for results.    Questioned patient about current smoking habits.  Pt. has never smoked.  PULSE regular  My Chart: active  CLASSIFICATION OF OVERWEIGHT AND OBESITY BY BMI                        Obesity Class           BMI(kg/m2)  Underweight                                    < 18.5  Normal                                         18.5-24.9  Overweight                                     25.0-29.9  OBESITY                     I                  30.0-34.9                             II                 35.0-39.9  EXTREME OBESITY             III                >40                            Patient's  BMI Body mass index is 45.91 kg/m .  http://hin.nhlbi.nih.gov/menuplanner/menu.cgi  Pre-visit planning  Immunizations - up to date  Colonoscopy -   Mammogram -   Asthma -   PHQ9 -    RHODA-7 -

## 2022-09-20 DIAGNOSIS — E87.1 HYPONATREMIA: ICD-10-CM

## 2022-09-20 LAB
BUN SERPL-MCNC: 10 MG/DL (ref 7–25)
BUN/CREATININE RATIO: 12.8 (ref 6–22)
CALCIUM SERPL-MCNC: 8.3 MG/DL (ref 8.6–10.3)
CHLORIDE SERPLBLD-SCNC: 92.5 MMOL/L (ref 98–110)
CO2 SERPL-SCNC: 26.1 MMOL/L (ref 20–32)
CREAT SERPL-MCNC: 0.78 MG/DL (ref 0.6–1.3)
GLUCOSE SERPL-MCNC: 155 MG/DL (ref 60–99)
POTASSIUM SERPL-SCNC: 4.85 MMOL/L (ref 3.5–5.3)
SODIUM SERPL-SCNC: 128.4 MMOL/L (ref 135–146)

## 2022-09-20 PROCEDURE — 80048 BASIC METABOLIC PNL TOTAL CA: CPT | Performed by: FAMILY MEDICINE

## 2022-09-20 PROCEDURE — 36415 COLL VENOUS BLD VENIPUNCTURE: CPT | Performed by: FAMILY MEDICINE

## 2022-09-26 ENCOUNTER — MEDICAL CORRESPONDENCE (OUTPATIENT)
Dept: HEALTH INFORMATION MANAGEMENT | Facility: CLINIC | Age: 77
End: 2022-09-26

## 2022-09-27 ENCOUNTER — TELEPHONE (OUTPATIENT)
Dept: FAMILY MEDICINE | Facility: CLINIC | Age: 77
End: 2022-09-27

## 2022-09-27 DIAGNOSIS — E11.8 TYPE 2 DIABETES MELLITUS WITH COMPLICATION, WITHOUT LONG-TERM CURRENT USE OF INSULIN (H): Primary | ICD-10-CM

## 2022-09-27 NOTE — TELEPHONE ENCOUNTER
Pt is coming in for lab appt, has outside orders to recheck sodium level. He was wondering if he can also get his A1c checked because he is due, or does he needs an OV for this?    If you are ok with lab only for A1c, I will inform patient.

## 2022-10-11 DIAGNOSIS — E11.8 TYPE 2 DIABETES MELLITUS WITH COMPLICATION, WITHOUT LONG-TERM CURRENT USE OF INSULIN (H): ICD-10-CM

## 2022-10-11 DIAGNOSIS — E87.1 HYPOSMOLALITY SYNDROME: Primary | ICD-10-CM

## 2022-10-11 LAB
BUN SERPL-MCNC: 10 MG/DL (ref 7–25)
BUN/CREATININE RATIO: 12.5 (ref 6–22)
CALCIUM SERPL-MCNC: 9.2 MG/DL (ref 8.6–10.3)
CHLORIDE SERPLBLD-SCNC: 93.8 MMOL/L (ref 98–110)
CO2 SERPL-SCNC: 33.5 MMOL/L (ref 20–32)
CREAT SERPL-MCNC: 0.8 MG/DL (ref 0.6–1.3)
GLUCOSE SERPL-MCNC: 149 MG/DL (ref 60–99)
HBA1C MFR BLD: 6.3 % (ref 4–7)
POTASSIUM SERPL-SCNC: 5.15 MMOL/L (ref 3.5–5.3)
SODIUM SERPL-SCNC: 132.8 MMOL/L (ref 135–146)

## 2022-10-11 PROCEDURE — 36415 COLL VENOUS BLD VENIPUNCTURE: CPT | Performed by: FAMILY MEDICINE

## 2022-10-11 PROCEDURE — 83036 HEMOGLOBIN GLYCOSYLATED A1C: CPT | Performed by: FAMILY MEDICINE

## 2022-10-11 PROCEDURE — 80048 BASIC METABOLIC PNL TOTAL CA: CPT | Performed by: FAMILY MEDICINE

## 2022-10-11 NOTE — NURSING NOTE
Pt here for lab only blood draw, has orders from [facility].  Will fax labs when resulted.

## 2022-10-13 DIAGNOSIS — E11.8 TYPE 2 DIABETES MELLITUS WITH COMPLICATION, WITHOUT LONG-TERM CURRENT USE OF INSULIN (H): ICD-10-CM

## 2022-10-13 RX ORDER — GABAPENTIN 300 MG/1
CAPSULE ORAL
Qty: 360 CAPSULE | Refills: 1 | Status: SHIPPED | OUTPATIENT
Start: 2022-10-13 | End: 2023-02-14

## 2022-10-13 NOTE — TELEPHONE ENCOUNTER
Last refilled 12/29/21. Please advise.    Jovi Aldana is requesting a refill of:    Pending Prescriptions:                       Disp   Refills    gabapentin (NEURONTIN) 300 MG capsule [Ph*360 ca*1            Sig: TAKE 1 CAPSULE BY MOUTH FOUR TIMES A DAY

## 2022-10-22 ENCOUNTER — HEALTH MAINTENANCE LETTER (OUTPATIENT)
Age: 77
End: 2022-10-22

## 2022-10-25 DIAGNOSIS — E87.1 SODIUM (NA) DEFICIENCY: Primary | ICD-10-CM

## 2022-10-25 LAB — SODIUM SERPL-SCNC: 135.3 MMOL/L (ref 135–146)

## 2022-10-25 PROCEDURE — 84295 ASSAY OF SERUM SODIUM: CPT | Performed by: FAMILY MEDICINE

## 2022-10-25 PROCEDURE — 36415 COLL VENOUS BLD VENIPUNCTURE: CPT | Performed by: FAMILY MEDICINE

## 2022-10-31 DIAGNOSIS — I10 ESSENTIAL HYPERTENSION: ICD-10-CM

## 2022-11-01 RX ORDER — CLONIDINE HYDROCHLORIDE 0.1 MG/1
TABLET ORAL
Qty: 270 TABLET | Refills: 0 | Status: SHIPPED | OUTPATIENT
Start: 2022-11-01 | End: 2023-01-30

## 2022-11-01 NOTE — TELEPHONE ENCOUNTER
Please advise, I am unsure when pt is do back after looking through his chart.    Jovi Aldana is requesting a refill of:    Pending Prescriptions:                       Disp   Refills    cloNIDine (CATAPRES) 0.1 MG tablet [Pharm*270 ta*0            Sig: TAKE 1 TABLET (0.1MG) BY MOUTH AT DINNERTIME, AND           TAKE 2 TABLETS (0.2MG) BY MOUTH AT BEDTIME.

## 2022-11-10 DIAGNOSIS — R60.9 EDEMA, UNSPECIFIED TYPE: ICD-10-CM

## 2022-11-10 DIAGNOSIS — E11.8 TYPE 2 DIABETES MELLITUS WITH COMPLICATION, WITHOUT LONG-TERM CURRENT USE OF INSULIN (H): ICD-10-CM

## 2022-11-10 DIAGNOSIS — I10 ESSENTIAL HYPERTENSION: ICD-10-CM

## 2022-11-10 DIAGNOSIS — N40.0 BENIGN PROSTATIC HYPERPLASIA WITHOUT LOWER URINARY TRACT SYMPTOMS: ICD-10-CM

## 2022-11-10 RX ORDER — FINASTERIDE 5 MG/1
1 TABLET, FILM COATED ORAL DAILY
Qty: 90 TABLET | Refills: 0 | Status: SHIPPED | OUTPATIENT
Start: 2022-11-10 | End: 2023-02-21

## 2022-11-11 RX ORDER — LISINOPRIL 40 MG/1
TABLET ORAL
Qty: 90 TABLET | Refills: 0 | Status: SHIPPED | OUTPATIENT
Start: 2022-11-11 | End: 2023-02-07

## 2022-11-11 RX ORDER — SPIRONOLACTONE 25 MG/1
TABLET ORAL
Qty: 90 TABLET | Refills: 0 | Status: SHIPPED | OUTPATIENT
Start: 2022-11-11 | End: 2023-02-07

## 2022-11-11 RX ORDER — SITAGLIPTIN AND METFORMIN HYDROCHLORIDE 1000; 50 MG/1; MG/1
TABLET, FILM COATED ORAL
Qty: 180 TABLET | Refills: 0 | Status: SHIPPED | OUTPATIENT
Start: 2022-11-11 | End: 2023-02-07

## 2022-11-11 RX ORDER — FUROSEMIDE 20 MG
TABLET ORAL
Qty: 90 TABLET | Refills: 0 | COMMUNITY
Start: 2022-11-11

## 2022-11-11 RX ORDER — AMLODIPINE BESYLATE 2.5 MG/1
TABLET ORAL
Qty: 90 TABLET | Refills: 0 | Status: SHIPPED | OUTPATIENT
Start: 2022-11-11 | End: 2023-02-07

## 2022-11-11 NOTE — TELEPHONE ENCOUNTER
Jovi Aldana is requesting a refill of:    Pending Prescriptions:                       Disp   Refills    lisinopril (ZESTRIL) 40 MG tablet [Pharma*90 tab*0            Sig: TAKE 1 TABLET BY MOUTH EVERY DAY    JANUMET  MG tablet [Pharmacy Med N*180 ta*0            Sig: TAKE 1 TABLET BY MOUTH TWICE A DAY WITH MEALS    spironolactone (ALDACTONE) 25 MG tablet [*90 tab*0            Sig: TAKE 1 TABLET BY MOUTH EVERY DAY    amLODIPine (NORVASC) 2.5 MG tablet [Pharm*90 tab*0            Sig: TAKE 1 TABLET BY MOUTH EVERY DAY  Refused Prescriptions:                       Disp   Refills    furosemide (LASIX) 20 MG tablet [Pharmacy *90 tab*0        Sig: TAKE 1 TABLET BY MOUTH EVERY DAY    Please close encounter if RX was sent. Thanks, Stephania

## 2022-11-15 DIAGNOSIS — E87.1 SODIUM (NA) DEFICIENCY: Primary | ICD-10-CM

## 2022-11-15 LAB — SODIUM SERPL-SCNC: 133 MMOL/L (ref 135–146)

## 2022-11-15 PROCEDURE — 36415 COLL VENOUS BLD VENIPUNCTURE: CPT | Performed by: FAMILY MEDICINE

## 2022-11-15 PROCEDURE — 84295 ASSAY OF SERUM SODIUM: CPT | Performed by: FAMILY MEDICINE

## 2022-11-16 ENCOUNTER — TRANSFERRED RECORDS (OUTPATIENT)
Dept: FAMILY MEDICINE | Facility: CLINIC | Age: 77
End: 2022-11-16

## 2022-11-23 DIAGNOSIS — E87.1 SODIUM (NA) DEFICIENCY: Primary | ICD-10-CM

## 2022-11-23 LAB
BUN SERPL-MCNC: 10 MG/DL (ref 7–25)
BUN/CREATININE RATIO: 12.3 (ref 6–22)
CALCIUM SERPL-MCNC: 9.3 MG/DL (ref 8.6–10.3)
CHLORIDE SERPLBLD-SCNC: 93.4 MMOL/L (ref 98–110)
CO2 SERPL-SCNC: 33.5 MMOL/L (ref 20–32)
CREAT SERPL-MCNC: 0.81 MG/DL (ref 0.6–1.3)
GLUCOSE SERPL-MCNC: 134 MG/DL (ref 60–99)
POTASSIUM SERPL-SCNC: 5.41 MMOL/L (ref 3.5–5.3)
SODIUM SERPL-SCNC: 132.4 MMOL/L (ref 135–146)

## 2022-11-23 PROCEDURE — 36415 COLL VENOUS BLD VENIPUNCTURE: CPT | Performed by: FAMILY MEDICINE

## 2022-11-23 PROCEDURE — 80048 BASIC METABOLIC PNL TOTAL CA: CPT | Performed by: FAMILY MEDICINE

## 2022-12-20 DIAGNOSIS — I10 ESSENTIAL HYPERTENSION: ICD-10-CM

## 2022-12-20 DIAGNOSIS — E87.1 SODIUM (NA) DEFICIENCY: Primary | ICD-10-CM

## 2022-12-20 LAB
BUN SERPL-MCNC: 9 MG/DL (ref 7–25)
BUN/CREATININE RATIO: 12.7 (ref 6–22)
CALCIUM SERPL-MCNC: 9.2 MG/DL (ref 8.6–10.3)
CHLORIDE SERPLBLD-SCNC: 90.2 MMOL/L (ref 98–110)
CO2 SERPL-SCNC: 35.6 MMOL/L (ref 20–32)
CREAT SERPL-MCNC: 0.71 MG/DL (ref 0.6–1.3)
GLUCOSE SERPL-MCNC: 196 MG/DL (ref 60–99)
POTASSIUM SERPL-SCNC: 4.8 MMOL/L (ref 3.5–5.3)
SODIUM SERPL-SCNC: 130.4 MMOL/L (ref 135–146)

## 2022-12-20 PROCEDURE — 80048 BASIC METABOLIC PNL TOTAL CA: CPT | Performed by: FAMILY MEDICINE

## 2022-12-20 PROCEDURE — 36415 COLL VENOUS BLD VENIPUNCTURE: CPT | Performed by: FAMILY MEDICINE

## 2022-12-20 NOTE — NURSING NOTE
Non fasting lab order     [facility]  Dr. Mark Calloway  Phone [phone]     Fax [phone]

## 2023-01-03 DIAGNOSIS — I10 ESSENTIAL HYPERTENSION, MALIGNANT: Primary | ICD-10-CM

## 2023-01-03 DIAGNOSIS — E87.1 HYPOSMOLALITY SYNDROME: ICD-10-CM

## 2023-01-03 DIAGNOSIS — E11.8 TYPE 2 DIABETES MELLITUS WITH COMPLICATION, WITHOUT LONG-TERM CURRENT USE OF INSULIN (H): Primary | ICD-10-CM

## 2023-01-03 LAB
BUN SERPL-MCNC: 10 MG/DL (ref 7–25)
BUN/CREATININE RATIO: 11.8 (ref 6–22)
CALCIUM SERPL-MCNC: 9.2 MG/DL (ref 8.6–10.3)
CHLORIDE SERPLBLD-SCNC: 93.8 MMOL/L (ref 98–110)
CO2 SERPL-SCNC: 29.2 MMOL/L (ref 20–32)
CREAT SERPL-MCNC: 0.85 MG/DL (ref 0.6–1.3)
GLUCOSE SERPL-MCNC: 178 MG/DL (ref 60–99)
POTASSIUM SERPL-SCNC: 4.82 MMOL/L (ref 3.5–5.3)
SODIUM SERPL-SCNC: 133.6 MMOL/L (ref 135–146)

## 2023-01-03 PROCEDURE — 80048 BASIC METABOLIC PNL TOTAL CA: CPT | Performed by: FAMILY MEDICINE

## 2023-01-03 PROCEDURE — 36415 COLL VENOUS BLD VENIPUNCTURE: CPT | Performed by: FAMILY MEDICINE

## 2023-01-03 NOTE — TELEPHONE ENCOUNTER
Jovi Aldana is requesting a refill of:    Pending Prescriptions:                       Disp   Refills    blood glucose (NO BRAND SPECIFIED) lancet*100 ea*1            Sig: Use to test blood sugar one time(s) daily or as           directed.    Please close encounter if RX was sent. Thanks, Stephania

## 2023-01-03 NOTE — NURSING NOTE
Non fasting lab order in file      [facility]  Dr. Mark Calloway  Phone [phone]     Fax [phone]       Will fax when resutls are back

## 2023-01-17 DIAGNOSIS — E87.1 SODIUM (NA) DEFICIENCY: Primary | ICD-10-CM

## 2023-01-17 DIAGNOSIS — I10 ESSENTIAL HYPERTENSION, BENIGN: ICD-10-CM

## 2023-01-17 LAB
BUN SERPL-MCNC: 10 MG/DL (ref 7–25)
BUN/CREATININE RATIO: 12.3 (ref 6–22)
CALCIUM SERPL-MCNC: 9.2 MG/DL (ref 8.6–10.3)
CHLORIDE SERPLBLD-SCNC: 92 MMOL/L (ref 98–110)
CO2 SERPL-SCNC: 34.6 MMOL/L (ref 20–32)
CREAT SERPL-MCNC: 0.81 MG/DL (ref 0.6–1.3)
GLUCOSE SERPL-MCNC: 155 MG/DL (ref 60–99)
POTASSIUM SERPL-SCNC: 4.68 MMOL/L (ref 3.5–5.3)
SODIUM SERPL-SCNC: 134.1 MMOL/L (ref 135–146)

## 2023-01-17 PROCEDURE — 80048 BASIC METABOLIC PNL TOTAL CA: CPT | Performed by: FAMILY MEDICINE

## 2023-01-17 PROCEDURE — 36415 COLL VENOUS BLD VENIPUNCTURE: CPT | Performed by: FAMILY MEDICINE

## 2023-01-17 NOTE — NURSING NOTE
pt here for lab only      Non fasting lab order in file      [facility]  Dr. Mark Calloway  Phone [phone]     Fax [phone]        Will fax when resutls are back

## 2023-01-30 ENCOUNTER — MYC MEDICAL ADVICE (OUTPATIENT)
Dept: FAMILY MEDICINE | Facility: CLINIC | Age: 78
End: 2023-01-30

## 2023-01-30 DIAGNOSIS — K21.00 GASTROESOPHAGEAL REFLUX DISEASE WITH ESOPHAGITIS WITHOUT HEMORRHAGE: ICD-10-CM

## 2023-01-30 DIAGNOSIS — I10 ESSENTIAL HYPERTENSION: ICD-10-CM

## 2023-01-30 RX ORDER — CLONIDINE HYDROCHLORIDE 0.1 MG/1
TABLET ORAL
Qty: 270 TABLET | Refills: 0 | Status: SHIPPED | OUTPATIENT
Start: 2023-01-30 | End: 2023-05-01

## 2023-01-30 RX ORDER — OMEPRAZOLE 40 MG/1
40 CAPSULE, DELAYED RELEASE ORAL DAILY
Qty: 90 CAPSULE | Refills: 0 | Status: SHIPPED | OUTPATIENT
Start: 2023-01-30 | End: 2023-03-16

## 2023-01-30 NOTE — TELEPHONE ENCOUNTER
Jovi Aldana is requesting a refill of:    Pending Prescriptions:                       Disp   Refills    omeprazole (PRILOSEC) 40 MG DR capsule [P*180 ca*1            Sig: TAKE 1 CAPSULE (40 MG) BY MOUTH 2 TIMES DAILY           (BEFORE MEALS)    cloNIDine (CATAPRES) 0.1 MG tablet [Pharm*270 ta*0            Sig: TAKE 1 TABLET (0.1MG) BY MOUTH AT DINNERTIME, AND           TAKE 2 TABLETS (0.2MG) BY MOUTH AT BEDTIME.    Sent Eriberto Pt due for OV

## 2023-01-31 DIAGNOSIS — I10 ESSENTIAL HYPERTENSION, BENIGN: Primary | ICD-10-CM

## 2023-01-31 DIAGNOSIS — E22.2: ICD-10-CM

## 2023-01-31 LAB
ALBUMIN (URINE) MG/L: 10
ALBUMIN URINE MG/G CR: <30 MG/G CREATININE
BUN SERPL-MCNC: 10 MG/DL (ref 7–25)
BUN/CREATININE RATIO: 12.8 (ref 6–22)
CALCIUM SERPL-MCNC: 9.2 MG/DL (ref 8.6–10.3)
CHLORIDE SERPLBLD-SCNC: 87.4 MMOL/L (ref 98–110)
CO2 SERPL-SCNC: 32.5 MMOL/L (ref 20–32)
CREAT SERPL-MCNC: 0.78 MG/DL (ref 0.6–1.3)
CREATININE URINE MG/DL: 50 MG/DL
GLUCOSE SERPL-MCNC: 199 MG/DL (ref 60–99)
POTASSIUM SERPL-SCNC: 4.85 MMOL/L (ref 3.5–5.3)
SODIUM SERPL-SCNC: 129 MMOL/L (ref 135–146)

## 2023-01-31 PROCEDURE — 80048 BASIC METABOLIC PNL TOTAL CA: CPT | Performed by: FAMILY MEDICINE

## 2023-01-31 PROCEDURE — 36415 COLL VENOUS BLD VENIPUNCTURE: CPT | Performed by: FAMILY MEDICINE

## 2023-01-31 PROCEDURE — 82043 UR ALBUMIN QUANTITATIVE: CPT | Performed by: FAMILY MEDICINE

## 2023-01-31 PROCEDURE — 82570 ASSAY OF URINE CREATININE: CPT | Performed by: FAMILY MEDICINE

## 2023-02-06 DIAGNOSIS — E11.8 TYPE 2 DIABETES MELLITUS WITH COMPLICATION, WITHOUT LONG-TERM CURRENT USE OF INSULIN (H): ICD-10-CM

## 2023-02-06 DIAGNOSIS — I10 ESSENTIAL HYPERTENSION: ICD-10-CM

## 2023-02-06 DIAGNOSIS — R60.9 EDEMA, UNSPECIFIED TYPE: ICD-10-CM

## 2023-02-07 RX ORDER — SPIRONOLACTONE 25 MG/1
TABLET ORAL
Qty: 90 TABLET | Refills: 0 | Status: SHIPPED | OUTPATIENT
Start: 2023-02-07 | End: 2023-02-14

## 2023-02-07 RX ORDER — AMLODIPINE BESYLATE 2.5 MG/1
TABLET ORAL
Qty: 90 TABLET | Refills: 0 | Status: SHIPPED | OUTPATIENT
Start: 2023-02-07 | End: 2023-05-12

## 2023-02-07 RX ORDER — SITAGLIPTIN AND METFORMIN HYDROCHLORIDE 1000; 50 MG/1; MG/1
TABLET, FILM COATED ORAL
Qty: 180 TABLET | Refills: 0 | Status: SHIPPED | OUTPATIENT
Start: 2023-02-07 | End: 2023-05-12

## 2023-02-07 RX ORDER — LISINOPRIL 40 MG/1
TABLET ORAL
Qty: 90 TABLET | Refills: 0 | Status: SHIPPED | OUTPATIENT
Start: 2023-02-07 | End: 2023-05-12

## 2023-02-07 NOTE — TELEPHONE ENCOUNTER
Jovi Aldana is requesting a refill of:    Pending Prescriptions:                       Disp   Refills    spironolactone (ALDACTONE) 25 MG tablet [*90 tab*0            Sig: TAKE 1 TABLET BY MOUTH EVERY DAY    amLODIPine (NORVASC) 2.5 MG tablet [Pharm*90 tab*0            Sig: TAKE 1 TABLET BY MOUTH EVERY DAY    lisinopril (ZESTRIL) 40 MG tablet [Pharma*90 tab*0            Sig: TAKE 1 TABLET BY MOUTH EVERY DAY    JANUMET  MG tablet [Pharmacy Med N*180 ta*0            Sig: TAKE 1 TABLET BY MOUTH TWICE A DAY WITH MEALS    Pt has OV on 2/14/23

## 2023-02-14 ENCOUNTER — OFFICE VISIT (OUTPATIENT)
Dept: FAMILY MEDICINE | Facility: CLINIC | Age: 78
End: 2023-02-14

## 2023-02-14 VITALS
SYSTOLIC BLOOD PRESSURE: 164 MMHG | BODY MASS INDEX: 41.75 KG/M2 | HEIGHT: 73 IN | TEMPERATURE: 97.1 F | HEART RATE: 68 BPM | RESPIRATION RATE: 20 BRPM | WEIGHT: 315 LBS | DIASTOLIC BLOOD PRESSURE: 78 MMHG

## 2023-02-14 DIAGNOSIS — E78.2 MIXED HYPERLIPIDEMIA: ICD-10-CM

## 2023-02-14 DIAGNOSIS — Z12.5 SPECIAL SCREENING FOR MALIGNANT NEOPLASM OF PROSTATE: ICD-10-CM

## 2023-02-14 DIAGNOSIS — G50.0 TRIGEMINAL NEURALGIA: ICD-10-CM

## 2023-02-14 DIAGNOSIS — E11.8 TYPE 2 DIABETES MELLITUS WITH COMPLICATION, WITHOUT LONG-TERM CURRENT USE OF INSULIN (H): Primary | ICD-10-CM

## 2023-02-14 DIAGNOSIS — R60.9 EDEMA, UNSPECIFIED TYPE: ICD-10-CM

## 2023-02-14 DIAGNOSIS — K21.00 GASTROESOPHAGEAL REFLUX DISEASE WITH ESOPHAGITIS WITHOUT HEMORRHAGE: ICD-10-CM

## 2023-02-14 DIAGNOSIS — I63.22 CEREBROVASCULAR ACCIDENT (CVA) DUE TO OCCLUSION OF BASILAR ARTERY (H): ICD-10-CM

## 2023-02-14 DIAGNOSIS — G62.9 PERIPHERAL POLYNEUROPATHY: ICD-10-CM

## 2023-02-14 DIAGNOSIS — I10 ESSENTIAL HYPERTENSION: ICD-10-CM

## 2023-02-14 DIAGNOSIS — E22.2: ICD-10-CM

## 2023-02-14 DIAGNOSIS — E66.01 MORBID OBESITY (H): ICD-10-CM

## 2023-02-14 LAB
ALBUMIN SERPL-MCNC: 4.2 G/DL (ref 3.6–5.1)
ALBUMIN/GLOB SERPL: 1.4 {RATIO} (ref 1–2.5)
ALP SERPL-CCNC: 74 U/L (ref 33–130)
ALT 1742-6: 11 U/L (ref 0–32)
AST 1920-8: 12 U/L (ref 0–35)
BILIRUB SERPL-MCNC: 0.4 MG/DL (ref 0.2–1.2)
BUN SERPL-MCNC: 8 MG/DL (ref 7–25)
BUN/CREATININE RATIO: 11.1 (ref 6–22)
CALCIUM SERPL-MCNC: 9.3 MG/DL (ref 8.6–10.3)
CHLORIDE SERPLBLD-SCNC: 92.3 MMOL/L (ref 98–110)
CO2 SERPL-SCNC: 32.8 MMOL/L (ref 20–32)
CREAT SERPL-MCNC: 0.72 MG/DL (ref 0.6–1.3)
GLOBULIN, CALCULATED - QUEST: 2.9 (ref 1.9–3.7)
GLUCOSE SERPL-MCNC: 130 MG/DL (ref 60–99)
HBA1C MFR BLD: 7.1 % (ref 4–7)
POTASSIUM SERPL-SCNC: 5.3 MMOL/L (ref 3.5–5.3)
PROT SERPL-MCNC: 7.1 G/DL (ref 6.1–8.1)
SODIUM SERPL-SCNC: 132.1 MMOL/L (ref 135–146)

## 2023-02-14 PROCEDURE — 80053 COMPREHEN METABOLIC PANEL: CPT | Performed by: FAMILY MEDICINE

## 2023-02-14 PROCEDURE — 83036 HEMOGLOBIN GLYCOSYLATED A1C: CPT | Performed by: FAMILY MEDICINE

## 2023-02-14 PROCEDURE — 99214 OFFICE O/P EST MOD 30 MIN: CPT | Performed by: FAMILY MEDICINE

## 2023-02-14 PROCEDURE — 36415 COLL VENOUS BLD VENIPUNCTURE: CPT | Performed by: FAMILY MEDICINE

## 2023-02-14 RX ORDER — SPIRONOLACTONE 25 MG/1
12.5 TABLET ORAL DAILY
Qty: 45 TABLET | Refills: 0 | Status: SHIPPED | OUTPATIENT
Start: 2023-02-14 | End: 2023-04-19

## 2023-02-14 RX ORDER — GABAPENTIN 300 MG/1
300 CAPSULE ORAL 4 TIMES DAILY
Qty: 360 CAPSULE | Refills: 1 | Status: SHIPPED | OUTPATIENT
Start: 2023-02-14 | End: 2023-08-01

## 2023-02-14 RX ORDER — CARBAMAZEPINE 200 MG/1
TABLET, EXTENDED RELEASE ORAL
Qty: 540 TABLET | Refills: 0 | Status: SHIPPED | OUTPATIENT
Start: 2023-02-14 | End: 2023-05-15

## 2023-02-14 RX ORDER — FUROSEMIDE 20 MG
10 TABLET ORAL 2 TIMES DAILY
Qty: 90 TABLET | Refills: 0 | Status: SHIPPED | OUTPATIENT
Start: 2023-02-14 | End: 2023-05-12

## 2023-02-14 NOTE — PROGRESS NOTES
"  Assessment & Plan     Type 2 diabetes mellitus with complication, without long-term current use of insulin (H)  Slight decrease in control, Continue to work on healthy diet and exercise, discussed healthy habits continue current medications at current doses   - HEMOGLOBIN A1C (BFP)  - VENOUS COLLECTION    Mixed hyperlipidemia  Control uncertain, not on statn, Continue to work on healthy diet and exercise, discussed healthy habits     Essential hypertension  Borderline high, difficult issue as meds to lower BP also worsen SIADH, home readings acceptable, would continue to work with neohrology    Syndrome of inappropriate ADH production (H)  Followed closely by nephrology, notes reviewed    Cerebrovascular accident (CVA) due to occlusion of basilar artery (H)  ASA    Peripheral polyneuropathy  doing OK    Morbid obesity (H)  Continue to work on healthy diet and exercise, discussed healthy habits     Gastroesophageal reflux disease with esophagitis without hemorrhage  Control uncertain, continue current medications at current doses , f/u if not improving or worsening     Edema, unspecified type  Worsened with decreased diurectics    Trigeminal neuralgia      Special screening for malignant neoplasm of prostate        Review of external notes as documented elsewhere in note  Review of the result(s) of each unique test - labs  Ordering of each unique test  Prescription drug management         BMI:   Estimated body mass index is 46.7 kg/m  as calculated from the following:    Height as of this encounter: 1.854 m (6' 1\").    Weight as of this encounter: 160.6 kg (354 lb).   Weight management plan: Discussed healthy diet and exercise guidelines    FUTURE APPOINTMENTS:       - Follow-up visit in 6 mo  Work on weight loss  Regular exercise    No follow-ups on file.    Josafat Marroquin MD  [facility]    Subjective   Peterson is a 77 year old accompanied by his spouse, presenting for the following health " issues:  Recheck Medication      HPI     Diabetes Follow-up    How often are you checking your blood sugar? One time daily  What time of day are you checking your blood sugars (select all that apply)?  Before meals  100-120  Have you had any blood sugars above 200?  No  Have you had any blood sugars below 70?  No    What symptoms do you notice when your blood sugar is low?  Shaky    What concerns do you have today about your diabetes? None     Do you have any of these symptoms? (Select all that apply)  Numbness in feet              Hyperlipidemia Follow-Up      Are you regularly taking any medication or supplement to lower your cholesterol?   No    Are you having muscle aches or other side effects that you think could be caused by your cholesterol lowering medication?  No    Hypertension Follow-up      Do you check your blood pressure regularly outside of the clinic? Yes     Are you following a low salt diet? No    Are your blood pressures ever more than 140 on the top number (systolic) OR more   than 90 on the bottom number (diastolic), for example 140/90? Yes  140's/70's at home    BP Readings from Last 2 Encounters:   02/14/23 (!) 164/78   09/13/22 (!) 152/68     Hemoglobin A1C (%)   Date Value   10/11/2022 6.3   06/16/2022 6.9     LDL Cholesterol Calculated (mg/dL)   Date Value   08/09/2017 178 (H)   01/30/2017 184 (H)     LDL Cholesterol Direct (mg/dL)   Date Value   06/16/2022 163 (A)   08/23/2021 143 (A)       Cerebrovascular Follow-up      Patient history:     Residual symptoms: None    Worsened or new symptoms since last visit: No    Daily aspirin use: Yes    Hypertension controlled: borderline    Edema/SIADH-nephrology now has him on 10 mg lasix BId and , 12.5 mg spironolactone daily-edema worse since decreases recently-pt seeing nephrology 4/23    GERD- stable on PPI daily, No fevers, melena, hematemesis, or weight loss.       How many servings of fruits and vegetables do you eat daily?  2-3    On  "average, how many sweetened beverages do you drink each day (Examples: soda, juice, sweet tea, etc.  Do NOT count diet or artificially sweetened beverages)?   1    How many days per week do you exercise enough to make your heart beat faster? 3 or less    How many minutes a day do you exercise enough to make your heart beat faster? 9 or less    How many days per week do you miss taking your medication? 0        Review of Systems   Constitutional, HEENT, cardiovascular, pulmonary, gi and gu systems are negative, except as otherwise noted.      Objective    BP (!) 164/78 (BP Location: Right arm, Patient Position: Chair, Cuff Size: Adult Large)   Pulse 68   Temp 97.1  F (36.2  C) (Temporal)   Resp 20   Ht 1.854 m (6' 1\")   Wt (!) 160.6 kg (354 lb)   BMI 46.70 kg/m    Body mass index is 46.7 kg/m .  Physical Exam   GENERAL: healthy, alert and no distress  NECK: no adenopathy, no asymmetry, masses, or scars and thyroid normal to palpation  RESP: lungs clear to auscultation - no rales, rhonchi or wheezes  CV: regular rate and rhythm, normal S1 S2, no S3 or S4, no murmur, click or rub, no peripheral edema and peripheral pulses strong  ABDOMEN: soft, nontender, no hepatosplenomegaly, no masses and bowel sounds normal  MS: no gross musculoskeletal defects noted, no edema  PSYCH: mentation appears normal, affect normal/bright    Orders Only on 01/31/2023   Component Date Value Ref Range Status     Carbon Dioxide 01/31/2023 32.5 (A)  20 - 32 mmol/L Final     Creatinine 01/31/2023 0.78  0.60 - 1.30 mg/dL Final     Glucose 01/31/2023 199 (A)  60 - 99 mg/dL Final     Sodium 01/31/2023 129.0 (A)  135 - 146 mmol/L Final     Potassium 01/31/2023 4.85  3.5 - 5.3 mmol/L Final     Chloride 01/31/2023 87.4 (A)  98 - 110 mmol/L Final     Urea Nitrogen 01/31/2023 10  7 - 25 mg/dL Final     Calcium 01/31/2023 9.2  8.6 - 10.3 mg/dL Final     BUN/Creatinine Ratio 01/31/2023 12.8  6 - 22 Final     Albumin mg/L 01/31/2023 10  30 Final     " Creatinine Urine mg/dL 01/31/2023 50  300 mg/dL Final     Albumin Urine mg/g Cr 01/31/2023 <30  30 MG/G Creatinine Final

## 2023-02-14 NOTE — NURSING NOTE
Jovi Aldana is here for fasting blood work and medication refill.  Questioned patient about current smoking habits.  Pt. has never smoked.  Body mass index is 33.67 kg/(m^2).  PULSE regular  My Chart: active    Pre-visit planning  Immunizations - up to date  Colonoscopy -   Mammogram -   Asthma -   PHQ9  RHODA-7

## 2023-02-15 LAB — ABBOTT PSA - QUEST: 0.48 NG/ML

## 2023-02-21 ENCOUNTER — TELEPHONE (OUTPATIENT)
Dept: UROLOGY | Facility: CLINIC | Age: 78
End: 2023-02-21
Payer: COMMERCIAL

## 2023-02-21 DIAGNOSIS — N40.0 BENIGN PROSTATIC HYPERPLASIA WITHOUT LOWER URINARY TRACT SYMPTOMS: ICD-10-CM

## 2023-02-21 RX ORDER — FINASTERIDE 5 MG/1
1 TABLET, FILM COATED ORAL DAILY
Qty: 30 TABLET | Refills: 0 | Status: SHIPPED | OUTPATIENT
Start: 2023-02-21 | End: 2023-02-22

## 2023-02-21 NOTE — TELEPHONE ENCOUNTER
[facility] Call Center    Phone Message    May a detailed message be left on voicemail: yes     Reason for Call: Medication Refill Request    Has the patient contacted the pharmacy for the refill? Yes   Name of medication being requested: finasteride (PROSCAR) 5 MG tablet     Provider who prescribed the medication: Irwin Cho MD  Pharmacy:[id number] - [place], [place] - [address] [address]  Date medication is needed: Asap    Action Taken: Message routed to:  Other: Uro    Travel Screening: Not Applicable

## 2023-02-21 NOTE — TELEPHONE ENCOUNTER
Finasteride rx for 30 days with no refills sent to patients preferred pharmacy.  Patient is scheduled to see Dr. Cho tomorrow for follow up.  More refills can be given at that time if necessary.    Casie Forbes RN, BSN  [facility]

## 2023-02-22 ENCOUNTER — VIRTUAL VISIT (OUTPATIENT)
Dept: UROLOGY | Facility: CLINIC | Age: 78
End: 2023-02-22
Payer: COMMERCIAL

## 2023-02-22 VITALS — WEIGHT: 315 LBS | HEIGHT: 73 IN | BODY MASS INDEX: 41.75 KG/M2

## 2023-02-22 DIAGNOSIS — R39.198 URINE STREAM SPRAYING: ICD-10-CM

## 2023-02-22 DIAGNOSIS — N40.0 BENIGN PROSTATIC HYPERPLASIA WITHOUT LOWER URINARY TRACT SYMPTOMS: ICD-10-CM

## 2023-02-22 DIAGNOSIS — N48.83 ACQUIRED BURIED PENIS: ICD-10-CM

## 2023-02-22 PROCEDURE — 99214 OFFICE O/P EST MOD 30 MIN: CPT | Mod: VID | Performed by: STUDENT IN AN ORGANIZED HEALTH CARE EDUCATION/TRAINING PROGRAM

## 2023-02-22 RX ORDER — FINASTERIDE 5 MG/1
1 TABLET, FILM COATED ORAL DAILY
Qty: 90 TABLET | Refills: 3 | Status: SHIPPED | OUTPATIENT
Start: 2023-02-22 | End: 2023-12-27

## 2023-02-22 RX ORDER — CETIRIZINE HYDROCHLORIDE 10 MG/1
10 TABLET ORAL DAILY
COMMUNITY
End: 2024-06-25

## 2023-02-22 ASSESSMENT — PAIN SCALES - GENERAL: PAINLEVEL: MODERATE PAIN (4)

## 2023-02-22 NOTE — PROGRESS NOTES
"Peterson is a 77 year old who is being evaluated via a billable video visit.      How would you like to obtain your AVS? MyChart  If the video visit is dropped, the invitation should be resent by: Other e-mail: my chart  Will anyone else be joining your video visit? No    Lewis Felix, clinic assistant      CHIEF COMPLAINT   Jovi Aldana who is a 77 year old male returns today for follow-up of BPH with LUTS, microhematuria.      Virtual visit with his wife    HPI   Jovi Aldana is a 77 year old male who presents with a history of BPH with LUTS, microhematuria    Urinary symptoms are stable. Mostly complains of urinary frequency, 8-10x a day. also has somewhat weak stream. 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 He is using ketoconazole prn, also sometimes mupirocin prn.    He underwent cystoscopy under anesthesia 8/11/2020 with large median lobe. Ever since that operation he complains of spraying of the stream    He is wondering about finasteride and whether this should be continued    Denies any gross hematuria    PHYSICAL EXAM  Patient is a 77 year old  male   Vitals: Height 1.854 m (6' 1\"), weight (!) 158.8 kg (350 lb).  Body mass index is 46.18 kg/m .  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 If I dilated it again it will probably recur again without self dilation and he can only visualize the glans penis with great difficulty. He declines intervention at this time    Continue finasteride 5 mg daily indefinitely due to known BPH with large median lobe. If urinary symptoms worsen, can consider repeat cystoscopy but will be difficult due to body habitus and will probably need urethral dilation as well    - refill finasteride 5 mg  - return 1 year with PSA prior, UA, PVR, AUA symptom score or earlier if has worsening urinary symptoms       Irwin Cho MD   [facility]  [facility] Phone: [phone]    Video-Visit Details    Type of service:  Video Visit   Video Start Time: 9:09 AM  Video End Time:9:23 AM    Originating Location (pt. Location): Home  Distant Location (provider location):  On-site  Platform used for Video Visit: Fabian

## 2023-02-22 NOTE — LETTER
"2/22/2023       RE: Jovi Aldana  [address]  [place] [address]     Dear Colleague,    Thank you for referring your patient, Jovi Aldana, to the [facility] at [facility]. Please see a copy of my visit note below.    Peterson is a 77 year old who is being evaluated via a billable video visit.      How would you like to obtain your AVS? MyChart  If the video visit is dropped, the invitation should be resent by: Other e-mail: my chart  Will anyone else be joining your video visit? No    Lewis Felix, clinic assistant      CHIEF COMPLAINT   Jovi Aldana who is a 77 year old male returns today for follow-up of BPH with LUTS, microhematuria.      Virtual visit with his wife    HPI   Jovi Aldana is a 77 year old male who presents with a history of BPH with LUTS, microhematuria    Urinary symptoms are stable. Mostly complains of urinary frequency, 8-10x a day. also has somewhat weak stream. 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 He is using ketoconazole prn, also sometimes mupirocin prn.    He underwent cystoscopy under anesthesia 8/11/2020 with large median lobe. Ever since that operation he complains of spraying of the stream    He is wondering about finasteride and whether this should be continued    Denies any gross hematuria    PHYSICAL EXAM  Patient is a 77 year old  male   Vitals: Height 1.854 m (6' 1\"), weight (!) 158.8 kg (350 lb).  Body mass index is 46.18 kg/m .  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 If I dilated it again it will probably recur again without self dilation and he can only visualize the glans penis with great difficulty. He declines intervention at this time    Continue finasteride 5 mg daily indefinitely due to known BPH with large median lobe. If urinary symptoms worsen, can consider repeat cystoscopy but will be difficult due to body habitus and will probably need urethral dilation as well    - refill finasteride 5 mg  - return 1 year with PSA prior, UA, PVR, AUA symptom score or earlier if has worsening urinary symptoms       Irwin Cho MD   [facility]  [facility] Phone: [phone]    Video-Visit Details    Type of service:  Video Visit   Video Start Time: 9:09 AM  Video End Time:9:23 AM    Originating Location (pt. Location): Home  Distant Location (provider location):  On-site  Platform used for Video Visit: Fabian

## 2023-02-22 NOTE — Clinical Note
- return 1 year with PSA prior, UA, PVR, AUA symptom score or earlier if has worsening urinary symptoms

## 2023-03-01 DIAGNOSIS — E87.1 HYPOSMOLALITY SYNDROME: Primary | ICD-10-CM

## 2023-03-01 LAB
BUN SERPL-MCNC: 9 MG/DL (ref 7–25)
BUN/CREATININE RATIO: 11 (ref 6–22)
CALCIUM SERPL-MCNC: 8.9 MG/DL (ref 8.6–10.3)
CHLORIDE SERPLBLD-SCNC: 92.9 MMOL/L (ref 98–110)
CO2 SERPL-SCNC: 33.6 MMOL/L (ref 20–32)
CREAT SERPL-MCNC: 0.82 MG/DL (ref 0.6–1.3)
GLUCOSE SERPL-MCNC: 159 MG/DL (ref 60–99)
POTASSIUM SERPL-SCNC: 5.27 MMOL/L (ref 3.5–5.3)
SODIUM SERPL-SCNC: 130.7 MMOL/L (ref 135–146)

## 2023-03-01 PROCEDURE — 36415 COLL VENOUS BLD VENIPUNCTURE: CPT | Performed by: FAMILY MEDICINE

## 2023-03-01 PROCEDURE — 80048 BASIC METABOLIC PNL TOTAL CA: CPT | Performed by: FAMILY MEDICINE

## 2023-03-01 NOTE — NURSING NOTE
Nursing Note  Alpa Cool MA (Medical Assistant)  Non fasting lab order in file      [facility]  Dr. Mark Calloway  Phone [phone]     Fax [phone]        Will fax when resutls

## 2023-03-14 DIAGNOSIS — E87.1 HYPOSMOLALITY SYNDROME: Primary | ICD-10-CM

## 2023-03-14 DIAGNOSIS — I10 ESSENTIAL HYPERTENSION: ICD-10-CM

## 2023-03-14 LAB
BUN SERPL-MCNC: 9 MG/DL (ref 7–25)
BUN/CREATININE RATIO: 12.2 (ref 6–22)
CALCIUM SERPL-MCNC: 9.6 MG/DL (ref 8.6–10.3)
CHLORIDE SERPLBLD-SCNC: 90.1 MMOL/L (ref 98–110)
CO2 SERPL-SCNC: 33.6 MMOL/L (ref 20–32)
CREAT SERPL-MCNC: 0.74 MG/DL (ref 0.6–1.3)
GLUCOSE SERPL-MCNC: 125 MG/DL (ref 60–99)
POTASSIUM SERPL-SCNC: 5.12 MMOL/L (ref 3.5–5.3)
SODIUM SERPL-SCNC: 130.8 MMOL/L (ref 135–146)

## 2023-03-14 PROCEDURE — 36415 COLL VENOUS BLD VENIPUNCTURE: CPT | Performed by: FAMILY MEDICINE

## 2023-03-14 PROCEDURE — 80048 BASIC METABOLIC PNL TOTAL CA: CPT | Performed by: FAMILY MEDICINE

## 2023-03-14 NOTE — NURSING NOTE
Non fasting lab order in file      [facility]  Dr. Mark Calloway  Phone [phone]     Fax [phone]        Will fax when resutls

## 2023-03-16 DIAGNOSIS — K21.00 GASTROESOPHAGEAL REFLUX DISEASE WITH ESOPHAGITIS WITHOUT HEMORRHAGE: ICD-10-CM

## 2023-03-16 RX ORDER — OMEPRAZOLE 40 MG/1
40 CAPSULE, DELAYED RELEASE ORAL 2 TIMES DAILY
Qty: 180 CAPSULE | Refills: 1 | Status: SHIPPED | OUTPATIENT
Start: 2023-03-16 | End: 2023-04-28

## 2023-03-16 NOTE — TELEPHONE ENCOUNTER
Pt's wife called asking for a refill of Peterson's omeprazole 40MG. She realized he has been taking this BID therefore his prescription will run out sooner than it was suppose to. She was wondering if you can resend the script for omeprazole 40MG BID.    Jovi Aldana is requesting a refill of:    Pending Prescriptions:                       Disp   Refills    omeprazole (PRILOSEC) 40 MG DR capsule    180 ca*1            Sig: Take 1 capsule (40 mg) by mouth 2 times daily

## 2023-03-28 DIAGNOSIS — I10 ESSENTIAL HYPERTENSION: ICD-10-CM

## 2023-03-28 DIAGNOSIS — E87.1 HYPOSMOLALITY SYNDROME: Primary | ICD-10-CM

## 2023-03-28 LAB
BUN SERPL-MCNC: 9 MG/DL (ref 7–25)
BUN/CREATININE RATIO: 11.8 (ref 6–22)
CALCIUM SERPL-MCNC: 8.8 MG/DL (ref 8.6–10.3)
CHLORIDE SERPLBLD-SCNC: 90.3 MMOL/L (ref 98–110)
CO2 SERPL-SCNC: 34.7 MMOL/L (ref 20–32)
CREAT SERPL-MCNC: 0.76 MG/DL (ref 0.6–1.3)
GLUCOSE SERPL-MCNC: 122 MG/DL (ref 60–99)
POTASSIUM SERPL-SCNC: 5.48 MMOL/L (ref 3.5–5.3)
SODIUM SERPL-SCNC: 129.2 MMOL/L (ref 135–146)

## 2023-03-28 PROCEDURE — 36415 COLL VENOUS BLD VENIPUNCTURE: CPT | Performed by: FAMILY MEDICINE

## 2023-03-28 PROCEDURE — 80048 BASIC METABOLIC PNL TOTAL CA: CPT | Performed by: FAMILY MEDICINE

## 2023-03-28 NOTE — NURSING NOTE
Non fasting lab order in file      [facility]  Dr. Luis A Calloway  Phone [phone]     Fax [phone]        Will fax when resutls are back

## 2023-04-19 ENCOUNTER — OFFICE VISIT (OUTPATIENT)
Dept: FAMILY MEDICINE | Facility: CLINIC | Age: 78
End: 2023-04-19

## 2023-04-19 VITALS
SYSTOLIC BLOOD PRESSURE: 156 MMHG | HEIGHT: 73 IN | RESPIRATION RATE: 20 BRPM | WEIGHT: 315 LBS | OXYGEN SATURATION: 92 % | BODY MASS INDEX: 41.75 KG/M2 | HEART RATE: 90 BPM | TEMPERATURE: 97.3 F | DIASTOLIC BLOOD PRESSURE: 62 MMHG

## 2023-04-19 DIAGNOSIS — U07.1 INFECTION DUE TO 2019 NOVEL CORONAVIRUS: Primary | ICD-10-CM

## 2023-04-19 LAB — COVID-19: POSITIVE

## 2023-04-19 PROCEDURE — G2023 SPECIMEN COLLECT COVID-19: HCPCS | Performed by: FAMILY MEDICINE

## 2023-04-19 PROCEDURE — 99213 OFFICE O/P EST LOW 20 MIN: CPT | Performed by: FAMILY MEDICINE

## 2023-04-19 PROCEDURE — 87635 SARS-COV-2 COVID-19 AMP PRB: CPT | Performed by: FAMILY MEDICINE

## 2023-04-19 RX ORDER — PRAZOSIN HYDROCHLORIDE 1 MG/1
1 CAPSULE ORAL AT BEDTIME
COMMUNITY
Start: 2023-03-16 | End: 2023-09-15

## 2023-04-19 NOTE — NURSING NOTE
Jovi Aldana presents with an illness characterized by dry cough, productive cough with sputum described as green, fever, nasal congestion, rhinorrhea, sore throat and wheezing. Symptoms began 4 days ago and are gradually worsening since that time.  Questioned patient about current smoking habits.  Pt. has never smoked.  Body mass index is 33.67 kg/(m^2).  PULSE regular  My Chart: active  Body mass index is 46.7 kg/m .  Pre-visit planning  Immunizations - up to date  Colonoscopy -   Mammogram -   Asthma -   PHQ9 -    RHODA-7 -      The patient has verbalized that it is ok to leave a detailed voice message on the patient's cell phone with results/recommendations from this visit.

## 2023-04-19 NOTE — PROGRESS NOTES
"SUBJECTIVE:   Jovi Aldana is a 78 year old male who complains of rhinorrhea, ST, productive cough, chest tightness, shortness of breath, myalgias, low grade fevers and fatigue for 3 days. He denies a history of dizzyness, vomiting and chest pain and denies a history of asthma. Patient does not smoke cigarettes.    Wife tested positive for covid 19- 6 days ago    Pt has taken ivermectin he got from a friend, robitussin     OBJECTIVE:BP (!) 156/62 (BP Location: Right arm, Patient Position: Chair, Cuff Size: Adult Large)   Pulse 90   Temp 97.3  F (36.3  C) (Temporal)   Resp 20   Ht 1.854 m (6' 1\")   Wt (!) 160.6 kg (354 lb)   SpO2 92%   BMI 46.70 kg/m     He appears well, vital signs are as noted by the nurse. Ears normal.  Throat and pharynx normal.  Neck supple. No adenopathy in the neck. Nose is congested. Sinuses non tender. The chest is clear, without wheezes or rales.    Home covid tests neg 2 days ago    covid positive today    ASSESSMENT:   Covid 19- pt higher risk so Paxlovid suggested-pt declines    PLAN:  Symptomatic therapy suggested: push fluids, rest and use acetaminophen, cough suppressant of choice as needed. Call or return to clinic prn if these symptoms worsen or fail to improve as anticipated.   "

## 2023-04-26 ENCOUNTER — OFFICE VISIT (OUTPATIENT)
Dept: FAMILY MEDICINE | Facility: CLINIC | Age: 78
End: 2023-04-26

## 2023-04-26 VITALS
TEMPERATURE: 97.2 F | HEART RATE: 84 BPM | BODY MASS INDEX: 41.75 KG/M2 | DIASTOLIC BLOOD PRESSURE: 70 MMHG | HEIGHT: 73 IN | RESPIRATION RATE: 20 BRPM | OXYGEN SATURATION: 95 % | WEIGHT: 315 LBS | SYSTOLIC BLOOD PRESSURE: 164 MMHG

## 2023-04-26 DIAGNOSIS — E87.1 HYPOSMOLALITY SYNDROME: ICD-10-CM

## 2023-04-26 DIAGNOSIS — J01.00 ACUTE NON-RECURRENT MAXILLARY SINUSITIS: ICD-10-CM

## 2023-04-26 DIAGNOSIS — U07.1 INFECTION DUE TO 2019 NOVEL CORONAVIRUS: Primary | ICD-10-CM

## 2023-04-26 PROCEDURE — 99213 OFFICE O/P EST LOW 20 MIN: CPT | Performed by: FAMILY MEDICINE

## 2023-04-26 PROCEDURE — 36415 COLL VENOUS BLD VENIPUNCTURE: CPT | Performed by: FAMILY MEDICINE

## 2023-04-26 PROCEDURE — 80048 BASIC METABOLIC PNL TOTAL CA: CPT | Performed by: FAMILY MEDICINE

## 2023-04-26 RX ORDER — CEFUROXIME AXETIL 500 MG/1
500 TABLET ORAL 2 TIMES DAILY
Qty: 20 TABLET | Refills: 0 | Status: SHIPPED | OUTPATIENT
Start: 2023-04-26 | End: 2023-08-01

## 2023-04-26 NOTE — NURSING NOTE
Jovi Aldana is here for his cough. Gets worse when laying down, coughing up phlegm.   Questioned patient about current smoking habits.  Pt. has never smoked.  PULSE regular  My Chart: active  Body mass index is 45.39 kg/m .    Pre-visit planning  Immunizations - up to date  Colonoscopy -   Mammogram -   Asthma -   PHQ9 -    RHODA-7 -      The patient has verbalized that it is ok to leave a detailed voice message on the patient's cell phone with results/recommendations from this visit.

## 2023-04-26 NOTE — PROGRESS NOTES
"SUBJECTIVE:   Jovi Aldana is a 78 year old male who complains of nasal congestion, green and thick nasal discharge,left teeth aching, facial pressure, bilateral sinus pain, productive cough, chest congestion, myalgias, fever, chills and fatigue for 7-10 days. He denies a history of shortness of breath, vomiting and chest pain and denies a history of asthma. Patient does not smoke cigarettes.     Pt tested positive for covid a week ago, notes reviewed-declined Paxovid    Pt tried mucinex and it made heart race and dizzy    Pt did take ivermectin but is now done    Pt does have some diarrhea      OBJECTIVE:BP (!) 164/70 (BP Location: Right arm, Patient Position: Chair, Cuff Size: Adult Large)   Pulse 84   Temp 97.2  F (36.2  C) (Temporal)   Resp 20   Ht 1.854 m (6' 1\")   Wt (!) 156 kg (344 lb)   SpO2 95%   BMI 45.39 kg/m     He appears mildly ill, no distress, vital signs are as noted by the nurse. Ears normal.  Throat and pharynx with mild erythema.  Neck supple. No adenopathy in the neck. Nose is congested. Sinuses  Tender-left maxillary and frontal. The chest is clear, without wheezes or rales.    ASSESSMENT:   covid 19 with developing bacterial sinusitis    PLAN:Ceforxime, pt has allergies to Augmentin (diarrhea) and erythromycin (rash), discussed newer data -no known cross reactivity with PCN and Cefalosporins  Symptomatic therapy suggested: push fluids, rest and use acetaminophen as needed. Call or return to clinic prn if these symptoms worsen or fail to improve as anticipated.     We will also recheck dosium given issues in past and altered fluid intacke with illness  "

## 2023-04-27 LAB
BUN SERPL-MCNC: 4 MG/DL (ref 7–25)
BUN/CREATININE RATIO: 5.3 (ref 6–32)
CALCIUM SERPL-MCNC: 8.4 MG/DL (ref 8.6–10.3)
CHLORIDE SERPLBLD-SCNC: 92.3 MMOL/L (ref 98–110)
CO2 SERPL-SCNC: 32.2 MMOL/L (ref 20–32)
CREAT SERPL-MCNC: 0.76 MG/DL (ref 0.6–1.3)
GLUCOSE SERPL-MCNC: 153 MG/DL (ref 60–99)
POTASSIUM SERPL-SCNC: 4.41 MMOL/L (ref 3.5–5.3)
SODIUM SERPL-SCNC: 130.3 MMOL/L (ref 135–146)

## 2023-04-28 DIAGNOSIS — K21.00 GASTROESOPHAGEAL REFLUX DISEASE WITH ESOPHAGITIS WITHOUT HEMORRHAGE: ICD-10-CM

## 2023-04-28 DIAGNOSIS — I10 ESSENTIAL HYPERTENSION: ICD-10-CM

## 2023-04-28 NOTE — TELEPHONE ENCOUNTER
Jovi Aldana is requesting a refill of:    Pending Prescriptions:                       Disp   Refills    omeprazole (PRILOSEC) 40 MG DR capsule [P*90 cap*1            Sig: TAKE 1 CAPSULE BY MOUTH EVERY DAY    cloNIDine (CATAPRES) 0.1 MG tablet [Pharm*270 ta*0            Sig: TAKE 1 TABLET (0.1MG) BY MOUTH AT DINNERTIME, AND           TAKE 2 TABLETS (0.2MG) BY MOUTH AT BEDTIME.    Please close encounter if RX was sent. Thanks, Stephania

## 2023-05-01 RX ORDER — CLONIDINE HYDROCHLORIDE 0.1 MG/1
TABLET ORAL
Qty: 270 TABLET | Refills: 0 | Status: SHIPPED | OUTPATIENT
Start: 2023-05-01 | End: 2023-08-01

## 2023-05-01 RX ORDER — OMEPRAZOLE 40 MG/1
CAPSULE, DELAYED RELEASE ORAL
Qty: 90 CAPSULE | Refills: 1 | Status: SHIPPED | OUTPATIENT
Start: 2023-05-01 | End: 2023-08-17

## 2023-05-11 DIAGNOSIS — E11.8 TYPE 2 DIABETES MELLITUS WITH COMPLICATION, WITHOUT LONG-TERM CURRENT USE OF INSULIN (H): ICD-10-CM

## 2023-05-11 DIAGNOSIS — R60.9 EDEMA, UNSPECIFIED TYPE: ICD-10-CM

## 2023-05-11 DIAGNOSIS — I10 ESSENTIAL HYPERTENSION: ICD-10-CM

## 2023-05-12 RX ORDER — LISINOPRIL 40 MG/1
TABLET ORAL
Qty: 90 TABLET | Refills: 0 | Status: SHIPPED | OUTPATIENT
Start: 2023-05-12 | End: 2023-08-01

## 2023-05-12 RX ORDER — AMLODIPINE BESYLATE 2.5 MG/1
TABLET ORAL
Qty: 90 TABLET | Refills: 0 | Status: SHIPPED | OUTPATIENT
Start: 2023-05-12 | End: 2023-08-01

## 2023-05-12 RX ORDER — FUROSEMIDE 20 MG
10 TABLET ORAL 2 TIMES DAILY
Qty: 90 TABLET | Refills: 0 | Status: SHIPPED | OUTPATIENT
Start: 2023-05-12 | End: 2023-08-01

## 2023-05-12 RX ORDER — SITAGLIPTIN AND METFORMIN HYDROCHLORIDE 1000; 50 MG/1; MG/1
TABLET, FILM COATED ORAL
Qty: 180 TABLET | Refills: 0 | Status: SHIPPED | OUTPATIENT
Start: 2023-05-12 | End: 2023-08-01

## 2023-05-12 NOTE — TELEPHONE ENCOUNTER
Jovi Aldana is requesting a refill of:    Pending Prescriptions:                       Disp   Refills    JANUMET  MG tablet [Pharmacy Med N*180 ta*0            Sig: TAKE 1 TABLET BY MOUTH TWICE A DAY WITH MEALS    furosemide (LASIX) 20 MG tablet [Pharmacy*90 tab*0            Sig: TAKE 0.5 TABLETS (10 MG) BY MOUTH 2 TIMES DAILY    lisinopril (ZESTRIL) 40 MG tablet [Pharma*90 tab*0            Sig: TAKE 1 TABLET BY MOUTH EVERY DAY    amLODIPine (NORVASC) 2.5 MG tablet [Pharm*90 tab*0            Sig: TAKE 1 TABLET BY MOUTH EVERY DAY    Please close encounter if RX was sent. Thanks, Stephania

## 2023-05-14 DIAGNOSIS — G50.0 TRIGEMINAL NEURALGIA: ICD-10-CM

## 2023-05-15 ENCOUNTER — TRANSFERRED RECORDS (OUTPATIENT)
Dept: FAMILY MEDICINE | Facility: CLINIC | Age: 78
End: 2023-05-15

## 2023-05-15 RX ORDER — CARBAMAZEPINE 200 MG/1
TABLET, EXTENDED RELEASE ORAL
Qty: 450 TABLET | Refills: 1 | Status: SHIPPED | OUTPATIENT
Start: 2023-05-15 | End: 2023-11-28

## 2023-05-15 NOTE — TELEPHONE ENCOUNTER
Jovi Aldana is requesting a refill of:    Pending Prescriptions:                       Disp   Refills    carBAMazepine (TEGRETOL XR) 200 MG 12 hr *450 ta*1            Sig: TAKE 2 TABS BY MOUTH DAILY WITH BREAKFAST AND 1           TAB DAILY WITH LUNCH AND 2 TABS AT BEDTIME.    Please close encounter if RX was sent. Thanks, Stephania

## 2023-05-22 DIAGNOSIS — E87.1 HYPOSMOLALITY SYNDROME: Primary | ICD-10-CM

## 2023-05-22 DIAGNOSIS — I10 ESSENTIAL HYPERTENSION, MALIGNANT: ICD-10-CM

## 2023-05-22 LAB
BUN SERPL-MCNC: 10 MG/DL (ref 7–25)
BUN/CREATININE RATIO: 13.5 (ref 6–32)
CALCIUM SERPL-MCNC: 9 MG/DL (ref 8.6–10.3)
CHLORIDE SERPLBLD-SCNC: 90.1 MMOL/L (ref 98–110)
CO2 SERPL-SCNC: 31.7 MMOL/L (ref 20–32)
CREAT SERPL-MCNC: 0.74 MG/DL (ref 0.6–1.3)
GLUCOSE SERPL-MCNC: 136 MG/DL (ref 60–99)
POTASSIUM SERPL-SCNC: 4.86 MMOL/L (ref 3.5–5.3)
SODIUM SERPL-SCNC: 129.9 MMOL/L (ref 135–146)

## 2023-05-22 PROCEDURE — 36415 COLL VENOUS BLD VENIPUNCTURE: CPT | Performed by: FAMILY MEDICINE

## 2023-05-22 PROCEDURE — 80048 BASIC METABOLIC PNL TOTAL CA: CPT | Performed by: FAMILY MEDICINE

## 2023-05-23 ENCOUNTER — TRANSFERRED RECORDS (OUTPATIENT)
Dept: FAMILY MEDICINE | Facility: CLINIC | Age: 78
End: 2023-05-23

## 2023-06-01 ENCOUNTER — HEALTH MAINTENANCE LETTER (OUTPATIENT)
Age: 78
End: 2023-06-01

## 2023-06-05 DIAGNOSIS — E87.1 HYPOSMOLALITY SYNDROME: Primary | ICD-10-CM

## 2023-06-05 DIAGNOSIS — I10 ESSENTIAL HYPERTENSION, MALIGNANT: ICD-10-CM

## 2023-06-05 LAB
BUN SERPL-MCNC: 10 MG/DL (ref 7–25)
BUN/CREATININE RATIO: 13.9 (ref 6–32)
CALCIUM SERPL-MCNC: 9.1 MG/DL (ref 8.6–10.3)
CHLORIDE SERPLBLD-SCNC: 93.4 MMOL/L (ref 98–110)
CO2 SERPL-SCNC: 33.6 MMOL/L (ref 20–32)
CREAT SERPL-MCNC: 0.72 MG/DL (ref 0.6–1.3)
GLUCOSE SERPL-MCNC: 160 MG/DL (ref 60–99)
POTASSIUM SERPL-SCNC: 5.04 MMOL/L (ref 3.5–5.3)
SODIUM SERPL-SCNC: 131.1 MMOL/L (ref 135–146)

## 2023-06-05 PROCEDURE — 36415 COLL VENOUS BLD VENIPUNCTURE: CPT | Performed by: FAMILY MEDICINE

## 2023-06-05 PROCEDURE — 80048 BASIC METABOLIC PNL TOTAL CA: CPT | Performed by: FAMILY MEDICINE

## 2023-06-05 NOTE — NURSING NOTE
Last ov 4-26-23    Lab only non fasting   Order in file  [facility]  Dr. Luis A Calloway  Phone [phone]     Fax [phone]     Will fax when results are back

## 2023-07-18 DIAGNOSIS — E87.1 HYPOSMOLALITY SYNDROME: Primary | ICD-10-CM

## 2023-07-18 DIAGNOSIS — I10 ESSENTIAL HYPERTENSION, MALIGNANT: ICD-10-CM

## 2023-07-18 LAB
BUN SERPL-MCNC: 9 MG/DL (ref 7–25)
BUN/CREATININE RATIO: 12.2 (ref 6–32)
CALCIUM SERPL-MCNC: 8.9 MG/DL (ref 8.6–10.3)
CHLORIDE SERPLBLD-SCNC: 93.1 MMOL/L (ref 98–110)
CO2 SERPL-SCNC: 33.2 MMOL/L (ref 20–32)
CREAT SERPL-MCNC: 0.74 MG/DL (ref 0.6–1.3)
GLUCOSE SERPL-MCNC: 176 MG/DL (ref 60–99)
POTASSIUM SERPL-SCNC: 5.11 MMOL/L (ref 3.5–5.3)
SODIUM SERPL-SCNC: 131.6 MMOL/L (ref 135–146)

## 2023-07-18 PROCEDURE — 80048 BASIC METABOLIC PNL TOTAL CA: CPT | Performed by: FAMILY MEDICINE

## 2023-07-18 PROCEDURE — 36415 COLL VENOUS BLD VENIPUNCTURE: CPT | Performed by: FAMILY MEDICINE

## 2023-07-18 NOTE — NURSING NOTE
Lab only non fasting     Order in file  [facility]  Dr. Luis A Calloway  Phone [phone]     Fax [phone]     Will fax when results

## 2023-08-01 ENCOUNTER — OFFICE VISIT (OUTPATIENT)
Dept: FAMILY MEDICINE | Facility: CLINIC | Age: 78
End: 2023-08-01

## 2023-08-01 VITALS
DIASTOLIC BLOOD PRESSURE: 60 MMHG | HEART RATE: 76 BPM | SYSTOLIC BLOOD PRESSURE: 148 MMHG | TEMPERATURE: 98.6 F | WEIGHT: 315 LBS | BODY MASS INDEX: 46.18 KG/M2 | RESPIRATION RATE: 20 BRPM

## 2023-08-01 DIAGNOSIS — K21.00 GASTROESOPHAGEAL REFLUX DISEASE WITH ESOPHAGITIS WITHOUT HEMORRHAGE: ICD-10-CM

## 2023-08-01 DIAGNOSIS — G50.0 TRIGEMINAL NEURALGIA: ICD-10-CM

## 2023-08-01 DIAGNOSIS — G62.9 PERIPHERAL POLYNEUROPATHY: ICD-10-CM

## 2023-08-01 DIAGNOSIS — E22.2: ICD-10-CM

## 2023-08-01 DIAGNOSIS — I63.22 CEREBROVASCULAR ACCIDENT (CVA) DUE TO OCCLUSION OF BASILAR ARTERY (H): ICD-10-CM

## 2023-08-01 DIAGNOSIS — E78.2 MIXED HYPERLIPIDEMIA: ICD-10-CM

## 2023-08-01 DIAGNOSIS — R60.9 EDEMA, UNSPECIFIED TYPE: ICD-10-CM

## 2023-08-01 DIAGNOSIS — I10 ESSENTIAL HYPERTENSION, MALIGNANT: ICD-10-CM

## 2023-08-01 DIAGNOSIS — I10 ESSENTIAL HYPERTENSION: ICD-10-CM

## 2023-08-01 DIAGNOSIS — E66.01 MORBID OBESITY (H): ICD-10-CM

## 2023-08-01 DIAGNOSIS — E11.8 TYPE 2 DIABETES MELLITUS WITH COMPLICATION, WITHOUT LONG-TERM CURRENT USE OF INSULIN (H): Primary | ICD-10-CM

## 2023-08-01 LAB
% GRANULOCYTES: 68.6 %
ALBUMIN SERPL-MCNC: 4 G/DL (ref 3.6–5.1)
ALBUMIN/GLOB SERPL: 1.7 {RATIO} (ref 1–2.5)
ALP SERPL-CCNC: 76 U/L (ref 33–130)
ALT 1742-6: 11 U/L (ref 0–32)
AST 1920-8: 11 U/L (ref 0–35)
BILIRUB SERPL-MCNC: 0.5 MG/DL (ref 0.2–1.2)
BUN SERPL-MCNC: 12 MG/DL (ref 7–25)
BUN/CREATININE RATIO: 15 (ref 6–32)
CALCIUM SERPL-MCNC: 9 MG/DL (ref 8.6–10.3)
CHLORIDE SERPLBLD-SCNC: 93.2 MMOL/L (ref 98–110)
CO2 SERPL-SCNC: 31.8 MMOL/L (ref 20–32)
CREAT SERPL-MCNC: 0.8 MG/DL (ref 0.6–1.3)
GLOBULIN, CALCULATED - QUEST: 2.8 (ref 1.9–3.7)
GLUCOSE SERPL-MCNC: 159 MG/DL (ref 60–99)
HBA1C MFR BLD: 6 % (ref 4–7)
HCT VFR BLD AUTO: 37.5 % (ref 40–53)
HEMOGLOBIN: 11.8 G/DL (ref 13.3–17.7)
LYMPHOCYTES NFR BLD AUTO: 20.9 %
MCH RBC QN AUTO: 31 PG (ref 26–33)
MCHC RBC AUTO-ENTMCNC: 31.7 G/DL (ref 31–36)
MCV RBC AUTO: 97.7 FL (ref 78–100)
MONOCYTES NFR BLD AUTO: 10.5 %
PLATELET COUNT - QUEST: 326 10^9/L (ref 150–375)
POTASSIUM SERPL-SCNC: 4.69 MMOL/L (ref 3.5–5.3)
PROT SERPL-MCNC: 6.8 G/DL (ref 6.1–8.1)
RBC # BLD AUTO: 3.84 10*12/L (ref 4.4–5.9)
SODIUM SERPL-SCNC: 130.1 MMOL/L (ref 135–146)
WBC # BLD AUTO: 6.5 10*9/L (ref 4–11)

## 2023-08-01 PROCEDURE — 83036 HEMOGLOBIN GLYCOSYLATED A1C: CPT | Performed by: FAMILY MEDICINE

## 2023-08-01 PROCEDURE — 80053 COMPREHEN METABOLIC PANEL: CPT | Performed by: FAMILY MEDICINE

## 2023-08-01 PROCEDURE — 99214 OFFICE O/P EST MOD 30 MIN: CPT | Performed by: FAMILY MEDICINE

## 2023-08-01 PROCEDURE — 36415 COLL VENOUS BLD VENIPUNCTURE: CPT | Performed by: FAMILY MEDICINE

## 2023-08-01 PROCEDURE — 85025 COMPLETE CBC W/AUTO DIFF WBC: CPT | Performed by: FAMILY MEDICINE

## 2023-08-01 RX ORDER — SODIUM CHLORIDE 1 G/1
1 TABLET ORAL 3 TIMES DAILY
COMMUNITY
Start: 2023-08-01

## 2023-08-01 RX ORDER — AMLODIPINE BESYLATE 2.5 MG/1
2.5 TABLET ORAL DAILY
Qty: 90 TABLET | Refills: 1 | Status: SHIPPED | OUTPATIENT
Start: 2023-08-01 | End: 2024-02-07

## 2023-08-01 RX ORDER — GABAPENTIN 300 MG/1
300 CAPSULE ORAL 4 TIMES DAILY
Qty: 360 CAPSULE | Refills: 1 | Status: SHIPPED | OUTPATIENT
Start: 2023-08-01 | End: 2024-03-21

## 2023-08-01 RX ORDER — SITAGLIPTIN AND METFORMIN HYDROCHLORIDE 1000; 50 MG/1; MG/1
1 TABLET, FILM COATED ORAL 2 TIMES DAILY WITH MEALS
Qty: 180 TABLET | Refills: 1 | Status: SHIPPED | OUTPATIENT
Start: 2023-08-01 | End: 2024-02-15

## 2023-08-01 RX ORDER — CLONIDINE HYDROCHLORIDE 0.1 MG/1
TABLET ORAL
Qty: 270 TABLET | Refills: 1 | Status: SHIPPED | OUTPATIENT
Start: 2023-08-01 | End: 2024-02-15

## 2023-08-01 RX ORDER — LISINOPRIL 40 MG/1
40 TABLET ORAL DAILY
Qty: 90 TABLET | Refills: 1 | Status: SHIPPED | OUTPATIENT
Start: 2023-08-01 | End: 2024-02-29

## 2023-08-01 RX ORDER — FUROSEMIDE 20 MG
10 TABLET ORAL 2 TIMES DAILY
Qty: 90 TABLET | Refills: 1 | Status: SHIPPED | OUTPATIENT
Start: 2023-08-01 | End: 2024-06-13

## 2023-08-01 RX ORDER — BACLOFEN 10 MG/1
10 TABLET ORAL DAILY
Qty: 180 TABLET | Refills: 1 | Status: SHIPPED | OUTPATIENT
Start: 2023-08-01 | End: 2024-08-26

## 2023-08-01 NOTE — PROGRESS NOTES
Assessment & Plan     Type 2 diabetes mellitus with complication, without long-term current use of insulin (H)  Well controlled, continue current medications at current doses   - HEMOGLOBIN A1C (BFP)  - VENOUS COLLECTION  - gabapentin (NEURONTIN) 300 MG capsule  Dispense: 360 capsule; Refill: 1  - sitagliptin-metFORMIN (JANUMET)  MG tablet  Dispense: 180 tablet; Refill: 1  - Comprehensive Metobolic Panel (BFP)    Mixed hyperlipidemia  Controlled, not on meds  - Comprehensive Metobolic Panel (BFP)    Essential hypertension, malignant  Slightly suboptimal but good given his hx, continue current medications at current doses   - Comprehensive Metobolic Panel (BFP)    Syndrome of inappropriate ADH production (H)  Followed closely by nephrology-labs today, continue current medications at current doses   - Comprehensive Metobolic Panel (BFP)    Gastroesophageal reflux disease with esophagitis without hemorrhage  Well controlled, continue current medications at current doses     Cerebrovascular accident (CVA) due to occlusion of basilar artery (H)  Stable, continue ASA, work on BP    Peripheral polyneuropathy  Stable, continue current medications at current doses     Morbid obesity (H)  Continue to work on healthy diet and exercise, discussed healthy habits     Essential hypertension    - amLODIPine (NORVASC) 2.5 MG tablet  Dispense: 90 tablet; Refill: 1  - cloNIDine (CATAPRES) 0.1 MG tablet  Dispense: 270 tablet; Refill: 1  - lisinopril (ZESTRIL) 40 MG tablet  Dispense: 90 tablet; Refill: 1    Edema, unspecified type  Stable, continue current medications at current doses   - furosemide (LASIX) 20 MG tablet  Dispense: 90 tablet; Refill: 1    Trigeminal neuralgia  Stable, continue current medications at current doses , check CBC OK  - baclofen (LIORESAL) 10 MG tablet  Dispense: 180 tablet; Refill: 1  - HEMOGRAM PLATELET DIFF (BFP)      Review of external notes as documented elsewhere in note  Review of the result(s)  of each unique test - labs  Ordering of each unique test  Prescription drug management         FUTURE APPOINTMENTS:       - Follow-up visit in 6 mo  Work on weight loss  Regular exercise    No follow-ups on file.    Josafat Marroquin MD  [facility]    Subjective   Peterson is a 78 year old, presenting for the following health issues:  Recheck Medication (ASA? Can he stop?)    HPI     Diabetes Follow-up    How often are you checking your blood sugar? A few times a week  What time of day are you checking your blood sugars (select all that apply)?  Before meals  Have you had any blood sugars above 200?  No  Have you had any blood sugars below 70?  No  What symptoms do you notice when your blood sugar is low?  Shaky  What concerns do you have today about your diabetes? None   Do you have any of these symptoms? (Select all that apply)  Numbness in feet          Hyperlipidemia Follow-Up    Are you regularly taking any medication or supplement to lower your cholesterol?   No  Are you having muscle aches or other side effects that you think could be caused by your cholesterol lowering medication?  No    Hypertension Follow-up    Do you check your blood pressure regularly outside of the clinic? Yes   Are you following a low salt diet? No  Are your blood pressures ever more than 140 on the top number (systolic) OR more   than 90 on the bottom number (diastolic), for example 140/90? Yes    BP Readings from Last 2 Encounters:   08/01/23 (!) 148/60   04/26/23 (!) 164/70     Hemoglobin A1C (%)   Date Value   02/14/2023 7.1 (A)   10/11/2022 6.3     LDL Cholesterol Calculated (mg/dL)   Date Value   08/09/2017 178 (H)   01/30/2017 184 (H)     LDL Cholesterol Direct (mg/dL)   Date Value   06/16/2022 163 (A)   08/23/2021 143 (A)       Cerebrovascular Follow-up    Patient history:   Residual symptoms: None  Worsened or new symptoms since last visit: No  Daily aspirin use: Yes  Hypertension controlled:  borderline    SIADH- seeing nephrology in 2 days    GERD, using PPI, No fevers, melena, hematemesis, or weight loss.   How many servings of fruits and vegetables do you eat daily?  2-3  On average, how many sweetened beverages do you drink each day (Examples: soda, juice, sweet tea, etc.  Do NOT count diet or artificially sweetened beverages)?   1  How many days per week do you exercise enough to make your heart beat faster? 3 or less  How many minutes a day do you exercise enough to make your heart beat faster? 9 or less  How many days per week do you miss taking your medication? 0          Review of Systems   Constitutional, HEENT, cardiovascular, pulmonary, gi and gu systems are negative, except as otherwise noted.      Objective    BP (!) 148/60 (BP Location: Right arm, Patient Position: Chair, Cuff Size: Adult Large)   Pulse 76   Temp 98.6  F (37  C) (Temporal)   Resp 20   Wt (!) 158.8 kg (350 lb)   BMI 46.18 kg/m    Body mass index is 46.18 kg/m .  Physical Exam   GENERAL: healthy, alert and no distress  EYES: Eyes grossly normal to inspection, PERRL and conjunctivae and sclerae normal  HENT: ear canals and TM's normal, nose and mouth without ulcers or lesions  NECK: no adenopathy, no asymmetry, masses, or scars and thyroid normal to palpation  RESP: lungs clear to auscultation - no rales, rhonchi or wheezes  CV: regular rate and rhythm, normal S1 S2, no S3 or S4, no murmur, click or rub, no peripheral edema and peripheral pulses strong  ABDOMEN: soft, nontender, no hepatosplenomegaly, no masses and bowel sounds normal  MS: no gross musculoskeletal defects noted, no edema  NEURO: Normal strength and tone, mentation intact and speech normal  PSYCH: mentation appears normal, affect normal/bright    Orders Only on 07/18/2023   Component Date Value Ref Range Status    Carbon Dioxide 07/18/2023 33.2 (A)  20 - 32 mmol/L Final    ran twice    Creatinine 07/18/2023 0.74  0.60 - 1.30 mg/dL Final    Glucose  07/18/2023 176 (A)  60 - 99 mg/dL Final    Sodium 07/18/2023 131.6 (A)  135 - 146 mmol/L Final    Potassium 07/18/2023 5.11  3.5 - 5.3 mmol/L Final    Chloride 07/18/2023 93.1 (A)  98 - 110 mmol/L Final    Urea Nitrogen 07/18/2023 9  7 - 25 mg/dL Final    Calcium 07/18/2023 8.9  8.6 - 10.3 mg/dL Final    BUN/Creatinine Ratio 07/18/2023 12.2  6 - 32 Final

## 2023-08-01 NOTE — NURSING NOTE
Jovi Aldana is here for a medication check and refill.    Questioned patient about current smoking habits.  Pt. has never smoked.  PULSE regular  My Chart: active  CLASSIFICATION OF OVERWEIGHT AND OBESITY BY BMI                        Obesity Class           BMI(kg/m2)  Underweight                                    < 18.5  Normal                                         18.5-24.9  Overweight                                     25.0-29.9  OBESITY                     I                  30.0-34.9                             II                 35.0-39.9  EXTREME OBESITY             III                >40                            Patient's  BMI Body mass index is 46.18 kg/m .  http://hin.nhlbi.nih.gov/menuplanner/menu.cgi  Pre-visit planning  Immunizations - up to date  Colonoscopy -   Mammogram -   Asthma -   PHQ9 -    RHODA-7 -      The patient has verbalized that it is ok to leave a detailed voice message on the patient's cell phone with results/recommendations from this visit.

## 2023-08-16 ENCOUNTER — MYC MEDICAL ADVICE (OUTPATIENT)
Dept: FAMILY MEDICINE | Facility: CLINIC | Age: 78
End: 2023-08-16

## 2023-08-16 DIAGNOSIS — K21.00 GASTROESOPHAGEAL REFLUX DISEASE WITH ESOPHAGITIS WITHOUT HEMORRHAGE: ICD-10-CM

## 2023-08-16 DIAGNOSIS — E87.1 HYPOSMOLALITY SYNDROME: Primary | ICD-10-CM

## 2023-08-16 LAB
BUN SERPL-MCNC: 11 MG/DL (ref 7–25)
BUN/CREATININE RATIO: 12.9 (ref 6–32)
CALCIUM SERPL-MCNC: 9.2 MG/DL (ref 8.6–10.3)
CHLORIDE SERPLBLD-SCNC: 92.8 MMOL/L (ref 98–110)
CO2 SERPL-SCNC: 33.8 MMOL/L (ref 20–32)
CREAT SERPL-MCNC: 0.85 MG/DL (ref 0.6–1.3)
GLUCOSE SERPL-MCNC: 165 MG/DL (ref 60–99)
POTASSIUM SERPL-SCNC: 4.99 MMOL/L (ref 3.5–5.3)
SODIUM SERPL-SCNC: 130.6 MMOL/L (ref 135–146)

## 2023-08-16 PROCEDURE — 80048 BASIC METABOLIC PNL TOTAL CA: CPT | Performed by: FAMILY MEDICINE

## 2023-08-16 PROCEDURE — 36415 COLL VENOUS BLD VENIPUNCTURE: CPT | Performed by: FAMILY MEDICINE

## 2023-08-16 NOTE — NURSING NOTE
Lab only non fasting      Order in file  [facility]  Dr. Luis A Calloway  Phone [phone]     Fax [phone]     Will fax when results

## 2023-08-17 RX ORDER — OMEPRAZOLE 40 MG/1
40 CAPSULE, DELAYED RELEASE ORAL 2 TIMES DAILY
Qty: 180 CAPSULE | Refills: 1 | Status: SHIPPED | OUTPATIENT
Start: 2023-08-17 | End: 2024-02-29

## 2023-08-17 NOTE — TELEPHONE ENCOUNTER
Please review pt's MyChart message.    Jovi Aldana is requesting a refill of:    Pending Prescriptions:                       Disp   Refills    omeprazole (PRILOSEC) 40 MG DR capsule    180 ca*1            Sig: Take 1 capsule (40 mg) by mouth 2 times daily

## 2023-08-29 DIAGNOSIS — E87.1 HYPOSMOLALITY SYNDROME: Primary | ICD-10-CM

## 2023-08-29 DIAGNOSIS — I10 ESSENTIAL HYPERTENSION: ICD-10-CM

## 2023-08-29 LAB
BUN SERPL-MCNC: 11 MG/DL (ref 7–25)
BUN/CREATININE RATIO: 14.3 (ref 6–32)
CALCIUM SERPL-MCNC: 8.9 MG/DL (ref 8.6–10.3)
CHLORIDE SERPLBLD-SCNC: 94 MMOL/L (ref 98–110)
CO2 SERPL-SCNC: 30.4 MMOL/L (ref 20–32)
CREAT SERPL-MCNC: 0.77 MG/DL (ref 0.6–1.3)
GLUCOSE SERPL-MCNC: 187 MG/DL (ref 60–99)
POTASSIUM SERPL-SCNC: 4.55 MMOL/L (ref 3.5–5.3)
SODIUM SERPL-SCNC: 132.2 MMOL/L (ref 135–146)

## 2023-08-29 PROCEDURE — 80048 BASIC METABOLIC PNL TOTAL CA: CPT | Performed by: FAMILY MEDICINE

## 2023-08-29 PROCEDURE — 36415 COLL VENOUS BLD VENIPUNCTURE: CPT | Performed by: FAMILY MEDICINE

## 2023-08-29 NOTE — NURSING NOTE
Lab only non fasting      Order in file  [facility]  Dr. Luis A Calloway  Phone [phone]     Fax [phone]     Will fax when results

## 2023-09-11 DIAGNOSIS — U07.1 INFECTION DUE TO 2019 NOVEL CORONAVIRUS: ICD-10-CM

## 2023-09-12 DIAGNOSIS — I10 ESSENTIAL HYPERTENSION: ICD-10-CM

## 2023-09-12 DIAGNOSIS — E87.1 HYPOSMOLALITY SYNDROME: Primary | ICD-10-CM

## 2023-09-12 LAB
BUN SERPL-MCNC: 11 MG/DL (ref 7–25)
BUN/CREATININE RATIO: 15.5 (ref 6–32)
CALCIUM SERPL-MCNC: 9 MG/DL (ref 8.6–10.3)
CHLORIDE SERPLBLD-SCNC: 93.8 MMOL/L (ref 98–110)
CO2 SERPL-SCNC: 34.4 MMOL/L (ref 20–32)
CREAT SERPL-MCNC: 0.71 MG/DL (ref 0.6–1.3)
GLUCOSE SERPL-MCNC: 136 MG/DL (ref 60–99)
POTASSIUM SERPL-SCNC: 4.91 MMOL/L (ref 3.5–5.3)
SODIUM SERPL-SCNC: 132.3 MMOL/L (ref 135–146)

## 2023-09-12 PROCEDURE — 36415 COLL VENOUS BLD VENIPUNCTURE: CPT | Performed by: FAMILY MEDICINE

## 2023-09-12 PROCEDURE — 80048 BASIC METABOLIC PNL TOTAL CA: CPT | Performed by: FAMILY MEDICINE

## 2023-09-15 RX ORDER — PRAZOSIN HYDROCHLORIDE 1 MG/1
1 CAPSULE ORAL AT BEDTIME
Qty: 90 CAPSULE | Refills: 1 | Status: SHIPPED | OUTPATIENT
Start: 2023-09-15 | End: 2024-08-26

## 2023-09-26 DIAGNOSIS — E87.1 HYPOSMOLALITY SYNDROME: Primary | ICD-10-CM

## 2023-09-26 DIAGNOSIS — I10 ESSENTIAL HYPERTENSION: ICD-10-CM

## 2023-09-26 LAB
BUN SERPL-MCNC: 9 MG/DL (ref 7–25)
BUN/CREATININE RATIO: 12.2 (ref 6–32)
CALCIUM SERPL-MCNC: 9 MG/DL (ref 8.6–10.3)
CHLORIDE SERPLBLD-SCNC: 90.7 MMOL/L (ref 98–110)
CO2 SERPL-SCNC: 28.9 MMOL/L (ref 20–32)
CREAT SERPL-MCNC: 0.74 MG/DL (ref 0.6–1.3)
GLUCOSE SERPL-MCNC: 147 MG/DL (ref 60–99)
POTASSIUM SERPL-SCNC: 4.58 MMOL/L (ref 3.5–5.3)
SODIUM SERPL-SCNC: 128.7 MMOL/L (ref 135–146)

## 2023-09-26 PROCEDURE — 36415 COLL VENOUS BLD VENIPUNCTURE: CPT | Performed by: FAMILY MEDICINE

## 2023-09-26 PROCEDURE — 80048 BASIC METABOLIC PNL TOTAL CA: CPT | Performed by: FAMILY MEDICINE

## 2023-09-26 NOTE — NURSING NOTE
Lab only non fasting      Order in file  [facility]  Dr. Luis A Calloway  Phone [phone]     Fax [phone]     Will fax when results

## 2023-10-10 DIAGNOSIS — I10 ESSENTIAL HYPERTENSION: ICD-10-CM

## 2023-10-10 DIAGNOSIS — E87.1 HYPOSMOLALITY SYNDROME: Primary | ICD-10-CM

## 2023-10-10 DIAGNOSIS — E22.2: ICD-10-CM

## 2023-10-10 LAB
BUN SERPL-MCNC: 14 MG/DL (ref 7–25)
BUN/CREATININE RATIO: 15.2 (ref 6–32)
CALCIUM SERPL-MCNC: 9 MG/DL (ref 8.6–10.3)
CHLORIDE SERPLBLD-SCNC: 94.7 MMOL/L (ref 98–110)
CO2 SERPL-SCNC: 29.6 MMOL/L (ref 20–32)
CREAT SERPL-MCNC: 0.92 MG/DL (ref 0.6–1.3)
GLUCOSE SERPL-MCNC: 154 MG/DL (ref 60–99)
POTASSIUM SERPL-SCNC: 4.63 MMOL/L (ref 3.5–5.3)
SODIUM SERPL-SCNC: 134.1 MMOL/L (ref 135–146)

## 2023-10-10 PROCEDURE — 80048 BASIC METABOLIC PNL TOTAL CA: CPT | Performed by: FAMILY MEDICINE

## 2023-10-10 PROCEDURE — 36415 COLL VENOUS BLD VENIPUNCTURE: CPT | Performed by: FAMILY MEDICINE

## 2023-10-10 NOTE — NURSING NOTE
Lab only non fasting      Order in file  [facility]  Dr. Luis A Calloway  Phone [phone]     Fax [phone]     Will fax when results are back

## 2023-10-24 DIAGNOSIS — E87.1 HYPOSMOLALITY SYNDROME: Primary | ICD-10-CM

## 2023-10-24 DIAGNOSIS — I10 ESSENTIAL HYPERTENSION: ICD-10-CM

## 2023-10-24 LAB
BUN SERPL-MCNC: 13 MG/DL (ref 7–25)
BUN/CREATININE RATIO: 16 (ref 6–32)
CALCIUM SERPL-MCNC: 9.1 MG/DL (ref 8.6–10.3)
CHLORIDE SERPLBLD-SCNC: 95.3 MMOL/L (ref 98–110)
CO2 SERPL-SCNC: 30.6 MMOL/L (ref 20–32)
CREAT SERPL-MCNC: 0.81 MG/DL (ref 0.6–1.3)
GLUCOSE SERPL-MCNC: 162 MG/DL (ref 60–99)
POTASSIUM SERPL-SCNC: 4.82 MMOL/L (ref 3.5–5.3)
SODIUM SERPL-SCNC: 136 MMOL/L (ref 135–146)

## 2023-10-24 PROCEDURE — 80048 BASIC METABOLIC PNL TOTAL CA: CPT | Performed by: FAMILY MEDICINE

## 2023-10-24 PROCEDURE — 36415 COLL VENOUS BLD VENIPUNCTURE: CPT | Performed by: FAMILY MEDICINE

## 2023-10-24 NOTE — NURSING NOTE
Non fasting     Lab only non fasting      Order in file  [facility]  Dr. Luis A Calloway  Phone [phone]     Fax [phone]     Will fax when results are back       Last ov 8-1-23

## 2023-11-07 DIAGNOSIS — E87.1 HYPOSMOLALITY SYNDROME: Primary | ICD-10-CM

## 2023-11-07 DIAGNOSIS — I10 ESSENTIAL HYPERTENSION: ICD-10-CM

## 2023-11-07 LAB
BUN SERPL-MCNC: 12 MG/DL (ref 7–25)
BUN/CREATININE RATIO: 13.5 (ref 6–32)
CALCIUM SERPL-MCNC: 9.5 MG/DL (ref 8.6–10.3)
CHLORIDE SERPLBLD-SCNC: 95.2 MMOL/L (ref 98–110)
CO2 SERPL-SCNC: 31.6 MMOL/L (ref 20–32)
CREAT SERPL-MCNC: 0.89 MG/DL (ref 0.6–1.3)
GLUCOSE SERPL-MCNC: 153 MG/DL (ref 60–99)
POTASSIUM SERPL-SCNC: 5.26 MMOL/L (ref 3.5–5.3)
SODIUM SERPL-SCNC: 134.6 MMOL/L (ref 135–146)

## 2023-11-07 PROCEDURE — 80048 BASIC METABOLIC PNL TOTAL CA: CPT | Performed by: FAMILY MEDICINE

## 2023-11-07 PROCEDURE — 36415 COLL VENOUS BLD VENIPUNCTURE: CPT | Performed by: FAMILY MEDICINE

## 2023-11-07 NOTE — NURSING NOTE
Non fasting      Lab only non fasting      Order in file  [facility]  Dr. Luis A Calloway  Phone [phone]     Fax [phone]     Will fax when results are back         Last ov 8-1-23

## 2023-11-20 ENCOUNTER — TRANSFERRED RECORDS (OUTPATIENT)
Dept: FAMILY MEDICINE | Facility: CLINIC | Age: 78
End: 2023-11-20

## 2023-11-28 ENCOUNTER — OFFICE VISIT (OUTPATIENT)
Dept: FAMILY MEDICINE | Facility: CLINIC | Age: 78
End: 2023-11-28

## 2023-11-28 VITALS
DIASTOLIC BLOOD PRESSURE: 62 MMHG | RESPIRATION RATE: 20 BRPM | SYSTOLIC BLOOD PRESSURE: 140 MMHG | BODY MASS INDEX: 41.75 KG/M2 | WEIGHT: 315 LBS | HEART RATE: 76 BPM | HEIGHT: 73 IN | TEMPERATURE: 97.1 F

## 2023-11-28 DIAGNOSIS — I10 ESSENTIAL HYPERTENSION: Primary | ICD-10-CM

## 2023-11-28 DIAGNOSIS — G50.0 TRIGEMINAL NEURALGIA: ICD-10-CM

## 2023-11-28 DIAGNOSIS — E66.01 MORBID OBESITY (H): ICD-10-CM

## 2023-11-28 DIAGNOSIS — R42 LIGHTHEADEDNESS: ICD-10-CM

## 2023-11-28 DIAGNOSIS — G62.9 PERIPHERAL POLYNEUROPATHY: ICD-10-CM

## 2023-11-28 DIAGNOSIS — E22.2: ICD-10-CM

## 2023-11-28 LAB
BUN SERPL-MCNC: 15 MG/DL (ref 7–25)
BUN/CREATININE RATIO: 16.9 (ref 6–32)
CALCIUM SERPL-MCNC: 9.1 MG/DL (ref 8.6–10.3)
CHLORIDE SERPLBLD-SCNC: 97.6 MMOL/L (ref 98–110)
CO2 SERPL-SCNC: 31.7 MMOL/L (ref 20–32)
CREAT SERPL-MCNC: 0.89 MG/DL (ref 0.6–1.3)
GLUCOSE SERPL-MCNC: 163 MG/DL (ref 60–99)
POTASSIUM SERPL-SCNC: 4.76 MMOL/L (ref 3.5–5.3)
SODIUM SERPL-SCNC: 137.2 MMOL/L (ref 135–146)

## 2023-11-28 PROCEDURE — 80048 BASIC METABOLIC PNL TOTAL CA: CPT | Performed by: FAMILY MEDICINE

## 2023-11-28 PROCEDURE — 36415 COLL VENOUS BLD VENIPUNCTURE: CPT | Performed by: FAMILY MEDICINE

## 2023-11-28 PROCEDURE — 99214 OFFICE O/P EST MOD 30 MIN: CPT | Performed by: FAMILY MEDICINE

## 2023-11-28 RX ORDER — CARBAMAZEPINE 200 MG/1
TABLET, EXTENDED RELEASE ORAL
Qty: 630 TABLET | Refills: 1 | Status: SHIPPED | OUTPATIENT
Start: 2023-11-28 | End: 2024-05-17

## 2023-11-28 RX ORDER — DAPAGLIFLOZIN 10 MG/1
10 TABLET, FILM COATED ORAL DAILY
Qty: 90 TABLET | Refills: 0 | Status: SHIPPED | OUTPATIENT
Start: 2023-11-28 | End: 2024-02-29

## 2023-11-28 RX ORDER — DAPAGLIFLOZIN 10 MG/1
10 TABLET, FILM COATED ORAL DAILY
COMMUNITY
Start: 2023-11-06 | End: 2023-11-28

## 2023-11-28 NOTE — PROGRESS NOTES
"  Assessment & Plan     Essential hypertension  Well controlled, appears to be slightly orthostatic presumably from volume depletion from diuretic, farxiga, diabetes    Discussed with [facility] pharmacy and we agree to discuss with nephrologist Dr Calloway-likely can drop one of the BP meds-discussed with Dr Calloway and we agree to decrease furosemide to 10 mg am, none pm, watch edema,. Get carotis US given lightheadedness  - Basic Metabolic Panel (BFP)  - VENOUS COLLECTION    Syndrome of inappropriate ADH production (H24)  Improved with farxiga but some volume issues as above  - FARXIGA 10 MG TABS tablet  Dispense: 90 tablet; Refill: 0  - Basic Metabolic Panel (BFP)  - VENOUS COLLECTION    Morbid obesity (H)  Continue to work on healthy diet and exercise, discussed healthy habits     Peripheral polyneuropathy  Stable, continue current medications at current doses     Trigeminal neuralgia  Flaring of late so has used more carbamazepine which may adversely affect sodium but will monitor  - carBAMazepine (TEGRETOL XR) 200 MG 12 hr tablet  Dispense: 630 tablet; Refill: 1      Review of external notes as documented elsewhere in note  Review of the result(s) of each unique test - labs  Discussion of management or test interpretation with external physician/other qualified healthcare professional/appropriate source - discussed with [facility] pharmacy and Nephrology  Ordering of each unique test  Prescription drug management  37 minutes spent by me on the date of the encounter doing chart review, review of outside records, review of test results, interpretation of tests, patient visit, documentation, discussion with other provider(s), and discussion with family        BMI:   Estimated body mass index is 45.91 kg/m  as calculated from the following:    Height as of this encounter: 1.854 m (6' 1\").    Weight as of this encounter: 157.9 kg (348 lb).   Weight management plan: Discussed healthy diet and exercise guidelines    Work on " "weight loss  Regular exercise    No follow-ups on file.    Josafat Marroquin MD  [facility]    Subjective   Peterson is a 78 year old, presenting for the following health issues:  No chief complaint on file.    HPI       Hypertension Follow-up- pt has been feeling more  fatigued and lightheaded    Do you check your blood pressure regularly outside of the clinic? Yes mainly 120-130's/50-70's  Did have evidence orthostatic changes on home readings  Are you following a low salt diet? No  Are your blood pressures ever more than 140 on the top number (systolic) OR more   than 90 on the bottom number (diastolic), for example 140/90? No    TN flaring last 2 months, has increased his carbamazepine of late    Pt was started on farxiga by nephrology to help SIADH-has helped sodium-reviewed labs    Edema doing better    How many servings of fruits and vegetables do you eat daily?  2-3  On average, how many sweetened beverages do you drink each day (Examples: soda, juice, sweet tea, etc.  Do NOT count diet or artificially sweetened beverages)?   1  How many days per week do you exercise enough to make your heart beat faster? 3 or less  How many minutes a day do you exercise enough to make your heart beat faster? 9 or less  How many days per week do you miss taking your medication? 0        Review of Systems   Constitutional, HEENT, cardiovascular, pulmonary, gi and gu systems are negative, except as otherwise noted.      Objective    BP (!) 140/62 (BP Location: Left arm, Patient Position: Chair, Cuff Size: Adult Large)   Pulse 76   Temp 97.1  F (36.2  C) (Temporal)   Resp 20   Ht 1.854 m (6' 1\")   Wt (!) 157.9 kg (348 lb)   BMI 45.91 kg/m    Body mass index is 45.91 kg/m .  Physical Exam   GENERAL: healthy, alert and no distress  NECK: no adenopathy, no asymmetry, masses, or scars and thyroid normal to palpation  RESP: lungs clear to auscultation - no rales, rhonchi or wheezes  CV: regular rates and " rhythm, normal S1 S2, no S3 or S4, no murmur, click or rub, peripheral pulses strong, and 1+ bilateral lower extremity pitting edema to midcalf    ABDOMEN: soft, nontender, no hepatosplenomegaly, no masses and bowel sounds normal  MS: no gross musculoskeletal defects noted, no edema  PSYCH: mentation appears normal, affect normal/bright

## 2023-11-28 NOTE — NURSING NOTE
Jovi Aldana is here for a BP check and labs. Also needs some medication dose change    Questioned patient about current smoking habits.  Pt. has never smoked.  PULSE regular  My Chart: active  CLASSIFICATION OF OVERWEIGHT AND OBESITY BY BMI                        Obesity Class           BMI(kg/m2)  Underweight                                    < 18.5  Normal                                         18.5-24.9  Overweight                                     25.0-29.9  OBESITY                     I                  30.0-34.9                             II                 35.0-39.9  EXTREME OBESITY             III                >40                            Patient's  BMI Body mass index is 45.91 kg/m .  http://hin.nhlbi.nih.gov/menuplanner/menu.cgi  Pre-visit planning  Immunizations - up to date  Colonoscopy -   Mammogram -   Asthma -   PHQ9 -    RHODA-7 -      The patient has verbalized that it is ok to leave a detailed voice message on the patient's cell phone with results/recommendations from this visit.

## 2023-11-30 DIAGNOSIS — N40.0 BPH (BENIGN PROSTATIC HYPERPLASIA): Primary | ICD-10-CM

## 2023-12-12 DIAGNOSIS — E87.1 NA DEFICIENCY: Primary | ICD-10-CM

## 2023-12-12 DIAGNOSIS — E22.2 SYNDROME OF INAPPROPRIATE VASOPRESSIN SECRETION (H): ICD-10-CM

## 2023-12-12 PROCEDURE — 80048 BASIC METABOLIC PNL TOTAL CA: CPT | Mod: 90 | Performed by: FAMILY MEDICINE

## 2023-12-12 PROCEDURE — 36415 COLL VENOUS BLD VENIPUNCTURE: CPT | Performed by: FAMILY MEDICINE

## 2023-12-13 LAB
BUN SERPL-MCNC: 13 MG/DL (ref 7–25)
BUN/CREATININE RATIO: ABNORMAL (CALC) (ref 6–22)
CALCIUM SERPL-MCNC: 9.2 MG/DL (ref 8.6–10.3)
CHLORIDE SERPLBLD-SCNC: 98 MMOL/L (ref 98–110)
CO2 SERPL-SCNC: 25 MMOL/L (ref 20–32)
CREAT SERPL-MCNC: 0.75 MG/DL (ref 0.7–1.22)
EGFR (QUEST): 91 ML/MIN/1.73M2
GLUCOSE - QUEST: 133 MG/DL (ref 65–99)
POTASSIUM SERPL-SCNC: 4.9 MMOL/L (ref 3.5–5.3)
SODIUM SERPL-SCNC: 136 MMOL/L (ref 135–146)

## 2023-12-26 DIAGNOSIS — E22.2 SYNDROME OF INAPPROPRIATE VASOPRESSIN SECRETION (H): Primary | ICD-10-CM

## 2023-12-26 DIAGNOSIS — E87.0 HYPEROSMOLALITY SYNDROME: ICD-10-CM

## 2023-12-26 LAB
BUN SERPL-MCNC: 11 MG/DL (ref 7–25)
BUN/CREATININE RATIO: 13.3 (ref 6–32)
CALCIUM SERPL-MCNC: 9.3 MG/DL (ref 8.6–10.3)
CHLORIDE SERPLBLD-SCNC: 96.8 MMOL/L (ref 98–110)
CO2 SERPL-SCNC: 31.4 MMOL/L (ref 20–32)
CREAT SERPL-MCNC: 0.83 MG/DL (ref 0.6–1.3)
GLUCOSE SERPL-MCNC: 146 MG/DL (ref 60–99)
POTASSIUM SERPL-SCNC: 4.97 MMOL/L (ref 3.5–5.3)
SODIUM SERPL-SCNC: 136.3 MMOL/L (ref 135–146)

## 2023-12-26 PROCEDURE — 80048 BASIC METABOLIC PNL TOTAL CA: CPT | Performed by: FAMILY MEDICINE

## 2023-12-26 PROCEDURE — 36415 COLL VENOUS BLD VENIPUNCTURE: CPT | Performed by: FAMILY MEDICINE

## 2023-12-26 NOTE — NURSING NOTE
Non fasting   Order will  end of this month  Wife notified of needing new order     Non fasting      Lab only non fasting      Order in file  [facility]  Dr. Luis A Calloway  Phone [phone]     Fax [phone]     Will fax when results are back         Last 23

## 2023-12-27 DIAGNOSIS — N40.0 BENIGN PROSTATIC HYPERPLASIA WITHOUT LOWER URINARY TRACT SYMPTOMS: ICD-10-CM

## 2023-12-27 RX ORDER — FINASTERIDE 5 MG/1
1 TABLET, FILM COATED ORAL DAILY
Qty: 90 TABLET | Refills: 0 | Status: SHIPPED | OUTPATIENT
Start: 2023-12-27 | End: 2024-05-02

## 2023-12-28 NOTE — PROGRESS NOTES
Assessment & Plan     Acute non-recurrent maxillary sinusitis-symptoms consistent with bacterial sinus infection, limited by multiple allergies and meds, pt previously tolerated Levaquin so will Rx this  -Rest, increase fluids, honey for cough suppression/sore throat  -Tylenol as needed for symptomatic relief  Seek emergency care for significant shortness of breath, chest pain, and/or fever >103F that cannot be controlled with antipyretics   - COVID-19 ([facility])  - levofloxacin (LEVAQUIN) 750 MG tablet  Dispense: 7 tablet; Refill: 0       Follow up as needed    No follow-ups on file.    Atul Silver PA-C  [facility]    Subjective   Peterson is a 78 year old, presenting for the following health issues:  Sinus Problem    HPI     Worsening sinus pressure with L ear popping. Some sinus pain as well. Teeth pain with this as well. Had COVID 4/23-had lots of sinus issues  Used Levaquin in the past for sinus issues-last used in the summer at some time.   Hx bacterial sinus infections in the past.     Acute Illness  Acute illness concerns: Sinus pain/teeth pain  Onset/Duration: 1 week  Symptoms:  Fever: No  Chills/Sweats: No  Headache (location?): No  Sinus Pressure: YES  Conjunctivitis:  No  Ear Pain: no  Rhinorrhea: YES  Congestion: YES  Sore Throat: No  Cough: no  Wheeze: No  Decreased Appetite: YES  Nausea: No  Vomiting: No  Diarrhea: No  Dysuria/Freq.: No  Dysuria or Hematuria: No  Fatigue/Achiness: YES  Sick/Strep Exposure: YES  Therapies tried and outcome: nothing    Review of Systems   Constitutional, HEENT, cardiovascular, pulmonary, gi and gu systems are negative, except as otherwise noted.      Objective    BP (!) 154/60 (BP Location: Right arm, Patient Position: Chair, Cuff Size: Adult Large)   Pulse 84   Temp 97.1  F (36.2  C) (Temporal)   Resp 22   Wt (!) 157.9 kg (348 lb)   BMI 45.91 kg/m    Body mass index is 45.91 kg/m .  Physical Exam   GENERAL: healthy, alert and no distress  HENT: normal  cephalic/atraumatic, ear canals and TM's normal, nose and mouth without ulcers or lesions, oropharynx clear, oral mucous membranes moist, and sinuses: maxillary tenderness on left  NECK: no adenopathy, no asymmetry, masses, or scars and thyroid normal to palpation  RESP: lungs clear to auscultation - no rales, rhonchi or wheezes  CV: regular rate and rhythm, normal S1 S2, no S3 or S4, no murmur, click or rub, no peripheral edema and peripheral pulses strong  ABDOMEN: soft, nontender, no hepatosplenomegaly, no masses and bowel sounds normal  MS: no gross musculoskeletal defects noted, no edema  NEURO: Normal strength and tone, mentation intact and speech normal  PSYCH: mentation appears normal, affect normal/bright    Results for orders placed or performed in visit on 12/29/23 (from the past 24 hour(s))   COVID-19 ([facility])   Result Value Ref Range    COVID-19 Negative

## 2023-12-29 ENCOUNTER — OFFICE VISIT (OUTPATIENT)
Dept: FAMILY MEDICINE | Facility: CLINIC | Age: 78
End: 2023-12-29

## 2023-12-29 VITALS
SYSTOLIC BLOOD PRESSURE: 154 MMHG | BODY MASS INDEX: 45.91 KG/M2 | TEMPERATURE: 97.1 F | RESPIRATION RATE: 22 BRPM | HEART RATE: 84 BPM | WEIGHT: 315 LBS | DIASTOLIC BLOOD PRESSURE: 60 MMHG

## 2023-12-29 DIAGNOSIS — J01.00 ACUTE NON-RECURRENT MAXILLARY SINUSITIS: Primary | ICD-10-CM

## 2023-12-29 LAB — COVID-19: NEGATIVE

## 2023-12-29 PROCEDURE — 87635 SARS-COV-2 COVID-19 AMP PRB: CPT | Performed by: PHYSICIAN ASSISTANT

## 2023-12-29 PROCEDURE — 99214 OFFICE O/P EST MOD 30 MIN: CPT | Performed by: PHYSICIAN ASSISTANT

## 2023-12-29 RX ORDER — LEVOFLOXACIN 750 MG/1
750 TABLET, FILM COATED ORAL DAILY
Qty: 7 TABLET | Refills: 0 | Status: SHIPPED | OUTPATIENT
Start: 2023-12-29 | End: 2024-03-05

## 2023-12-29 NOTE — NURSING NOTE
Jovi Aldana presents with an illness characterized by ear plugging, nasal congestion, rhinorrhea, sinus pain and pressure. Symptoms began 5 days ago and are gradually worsening since that time.  Questioned patient about current smoking habits.  Pt. has never smoked.  Body mass index is 33.67 kg/(m^2).  PULSE regular  My Chart: active  Body mass index is 45.91 kg/m .  Pre-visit planning  Immunizations - up to date  Colonoscopy -   Mammogram -   Asthma -   PHQ9 -    RHODA-7 -      The patient has verbalized that it is ok to leave a detailed voice message on the patient's cell phone with results/recommendations from this visit.

## 2024-01-09 DIAGNOSIS — E22.2 SYNDROME OF INAPPROPRIATE VASOPRESSIN SECRETION (H): ICD-10-CM

## 2024-01-09 DIAGNOSIS — E87.0 HYPEROSMOLALITY SYNDROME: Primary | ICD-10-CM

## 2024-01-09 DIAGNOSIS — I10 ESSENTIAL HYPERTENSION: ICD-10-CM

## 2024-01-09 LAB
BUN SERPL-MCNC: 10 MG/DL (ref 7–25)
BUN/CREATININE RATIO: 12.2 (ref 6–32)
CALCIUM SERPL-MCNC: 9.3 MG/DL (ref 8.6–10.3)
CHLORIDE SERPLBLD-SCNC: 96.3 MMOL/L (ref 98–110)
CO2 SERPL-SCNC: 28.4 MMOL/L (ref 20–32)
CREAT SERPL-MCNC: 0.82 MG/DL (ref 0.6–1.3)
GLUCOSE SERPL-MCNC: 115 MG/DL (ref 60–99)
POTASSIUM SERPL-SCNC: 5.24 MMOL/L (ref 3.5–5.3)
SODIUM SERPL-SCNC: 134.7 MMOL/L (ref 135–146)

## 2024-01-09 PROCEDURE — 36415 COLL VENOUS BLD VENIPUNCTURE: CPT | Performed by: FAMILY MEDICINE

## 2024-01-09 PROCEDURE — 80048 BASIC METABOLIC PNL TOTAL CA: CPT | Performed by: FAMILY MEDICINE

## 2024-01-09 NOTE — NURSING NOTE
Non fasting      Lab only non fasting      Order in file  [facility]  Dr. Luis A Calloway  Phone [phone]     Fax [phone]     Will fax when results are back

## 2024-02-05 DIAGNOSIS — I10 ESSENTIAL HYPERTENSION: ICD-10-CM

## 2024-02-06 DIAGNOSIS — I10 ESSENTIAL HYPERTENSION: Primary | ICD-10-CM

## 2024-02-06 DIAGNOSIS — E87.0 HYPEROSMOLALITY SYNDROME: ICD-10-CM

## 2024-02-06 LAB
BUN SERPL-MCNC: 11 MG/DL (ref 7–25)
BUN/CREATININE RATIO: 13.3 (ref 6–32)
CALCIUM SERPL-MCNC: 9 MG/DL (ref 8.6–10.3)
CHLORIDE SERPLBLD-SCNC: 97.8 MMOL/L (ref 98–110)
CO2 SERPL-SCNC: 30.9 MMOL/L (ref 20–32)
CREAT SERPL-MCNC: 0.83 MG/DL (ref 0.6–1.3)
GLUCOSE SERPL-MCNC: 123 MG/DL (ref 60–99)
POTASSIUM SERPL-SCNC: 5.3 MMOL/L (ref 3.5–5.3)
SODIUM SERPL-SCNC: 138 MMOL/L (ref 135–146)

## 2024-02-06 PROCEDURE — 80048 BASIC METABOLIC PNL TOTAL CA: CPT | Performed by: FAMILY MEDICINE

## 2024-02-06 PROCEDURE — 36415 COLL VENOUS BLD VENIPUNCTURE: CPT | Performed by: FAMILY MEDICINE

## 2024-02-06 NOTE — NURSING NOTE
Non fasting      Lab only non fasting      Order in file  [facility]  Dr. Luis A Calloway  Phone [phone]     Fax [phone]     Will fax when results are back

## 2024-02-07 RX ORDER — AMLODIPINE BESYLATE 2.5 MG/1
2.5 TABLET ORAL DAILY
Qty: 90 TABLET | Refills: 0 | Status: SHIPPED | OUTPATIENT
Start: 2024-02-07 | End: 2024-05-13

## 2024-02-07 NOTE — TELEPHONE ENCOUNTER
Jovi Aldana is requesting a refill of:    Pending Prescriptions:                       Disp   Refills    amLODIPine (NORVASC) 2.5 MG tablet [Pharm*90 tab*0            Sig: TAKE 1 TABLET BY MOUTH EVERY DAY

## 2024-02-15 DIAGNOSIS — I10 ESSENTIAL HYPERTENSION: ICD-10-CM

## 2024-02-15 DIAGNOSIS — E11.8 TYPE 2 DIABETES MELLITUS WITH COMPLICATION, WITHOUT LONG-TERM CURRENT USE OF INSULIN (H): ICD-10-CM

## 2024-02-15 RX ORDER — SITAGLIPTIN AND METFORMIN HYDROCHLORIDE 1000; 50 MG/1; MG/1
1 TABLET, FILM COATED ORAL 2 TIMES DAILY WITH MEALS
Qty: 180 TABLET | Refills: 0 | Status: SHIPPED | OUTPATIENT
Start: 2024-02-15 | End: 2024-05-17

## 2024-02-15 RX ORDER — CLONIDINE HYDROCHLORIDE 0.1 MG/1
TABLET ORAL
Qty: 270 TABLET | Refills: 1 | Status: SHIPPED | OUTPATIENT
Start: 2024-02-15 | End: 2024-08-26

## 2024-02-15 NOTE — TELEPHONE ENCOUNTER
Jovi Aldana is requesting a refill of:    Pending Prescriptions:                       Disp   Refills    cloNIDine (CATAPRES) 0.1 MG tablet [Pharm*270 ta*1            Sig: TAKE 1 TABLET BY MOUTH EVERY MORNING AND 2           TABLETS BY MOUTH AT BEDTIME    JANUMET  MG tablet [Pharmacy Med N*180 ta*0            Sig: TAKE 1 TABLET BY MOUTH TWICE A DAY WITH MEALS    Please close encounter if RX was sent. Thanks, Stephania

## 2024-02-29 DIAGNOSIS — E22.2: ICD-10-CM

## 2024-02-29 DIAGNOSIS — K21.00 GASTROESOPHAGEAL REFLUX DISEASE WITH ESOPHAGITIS WITHOUT HEMORRHAGE: ICD-10-CM

## 2024-02-29 DIAGNOSIS — I10 ESSENTIAL HYPERTENSION: ICD-10-CM

## 2024-02-29 RX ORDER — LISINOPRIL 40 MG/1
40 TABLET ORAL DAILY
Qty: 7 TABLET | Refills: 0 | Status: SHIPPED | OUTPATIENT
Start: 2024-02-29 | End: 2024-03-05

## 2024-02-29 RX ORDER — DAPAGLIFLOZIN 10 MG/1
10 TABLET, FILM COATED ORAL DAILY
Qty: 7 TABLET | Refills: 0 | Status: SHIPPED | OUTPATIENT
Start: 2024-02-29 | End: 2024-03-05

## 2024-02-29 RX ORDER — OMEPRAZOLE 40 MG/1
40 CAPSULE, DELAYED RELEASE ORAL 2 TIMES DAILY
Qty: 14 CAPSULE | Refills: 0 | Status: SHIPPED | OUTPATIENT
Start: 2024-02-29 | End: 2024-03-05

## 2024-02-29 NOTE — TELEPHONE ENCOUNTER
Pt scheduled appt for 03/05 needs extension of his medications. Can you send for JCC?    Jovi Aldana is requesting a refill of:    Pending Prescriptions:                       Disp   Refills    omeprazole (PRILOSEC) 40 MG DR capsule [P*14 cap*0            Sig: TAKE 1 CAPSULE BY MOUTH 2 TIMES DAILY.    lisinopril (ZESTRIL) 40 MG tablet [Pharma*7 tabl*0            Sig: TAKE 1 TABLET BY MOUTH EVERY DAY    FARXIGA 10 MG TABS tablet [Pharmacy Med N*7 tabl*0            Sig: TAKE 1 TABLET (10 MG) BY MOUTH DAILY.

## 2024-03-05 ENCOUNTER — OFFICE VISIT (OUTPATIENT)
Dept: FAMILY MEDICINE | Facility: CLINIC | Age: 79
End: 2024-03-05

## 2024-03-05 VITALS
WEIGHT: 315 LBS | HEIGHT: 73 IN | BODY MASS INDEX: 41.75 KG/M2 | RESPIRATION RATE: 20 BRPM | SYSTOLIC BLOOD PRESSURE: 144 MMHG | TEMPERATURE: 97.1 F | HEART RATE: 72 BPM | DIASTOLIC BLOOD PRESSURE: 62 MMHG

## 2024-03-05 DIAGNOSIS — I63.22 CEREBROVASCULAR ACCIDENT (CVA) DUE TO OCCLUSION OF BASILAR ARTERY (H): ICD-10-CM

## 2024-03-05 DIAGNOSIS — G62.9 PERIPHERAL POLYNEUROPATHY: ICD-10-CM

## 2024-03-05 DIAGNOSIS — G50.0 TRIGEMINAL NEURALGIA: ICD-10-CM

## 2024-03-05 DIAGNOSIS — E22.2: ICD-10-CM

## 2024-03-05 DIAGNOSIS — G47.33 OSA (OBSTRUCTIVE SLEEP APNEA): ICD-10-CM

## 2024-03-05 DIAGNOSIS — I10 ESSENTIAL HYPERTENSION: ICD-10-CM

## 2024-03-05 DIAGNOSIS — I89.0 ACQUIRED LYMPHEDEMA OF LOWER EXTREMITY: ICD-10-CM

## 2024-03-05 DIAGNOSIS — E11.8 TYPE 2 DIABETES MELLITUS WITH COMPLICATION, WITHOUT LONG-TERM CURRENT USE OF INSULIN (H): Primary | ICD-10-CM

## 2024-03-05 DIAGNOSIS — K21.00 GASTROESOPHAGEAL REFLUX DISEASE WITH ESOPHAGITIS WITHOUT HEMORRHAGE: ICD-10-CM

## 2024-03-05 DIAGNOSIS — Z00.00 ENCOUNTER FOR ANNUAL WELLNESS EXAM IN MEDICARE PATIENT: ICD-10-CM

## 2024-03-05 DIAGNOSIS — E78.2 MIXED HYPERLIPIDEMIA: ICD-10-CM

## 2024-03-05 DIAGNOSIS — N40.1 BENIGN PROSTATIC HYPERPLASIA WITH LOWER URINARY TRACT SYMPTOMS, SYMPTOM DETAILS UNSPECIFIED: ICD-10-CM

## 2024-03-05 DIAGNOSIS — Z13.89 SCREENING FOR DIABETIC PERIPHERAL NEUROPATHY: ICD-10-CM

## 2024-03-05 DIAGNOSIS — R53.83 FATIGUE, UNSPECIFIED TYPE: ICD-10-CM

## 2024-03-05 LAB
% GRANULOCYTES: 63.3 %
ALBUMIN SERPL-MCNC: 4 G/DL (ref 3.6–5.1)
ALBUMIN/GLOB SERPL: 1.4 {RATIO} (ref 1–2.5)
ALP SERPL-CCNC: 58 U/L (ref 33–130)
ALT 1742-6: 12 U/L (ref 0–32)
AST 1920-8: 11 U/L (ref 0–35)
BILIRUB SERPL-MCNC: 0.5 MG/DL (ref 0.2–1.2)
BUN SERPL-MCNC: 11 MG/DL (ref 7–25)
BUN/CREATININE RATIO: 14.5 (ref 6–32)
CALCIUM SERPL-MCNC: 9.2 MG/DL (ref 8.6–10.3)
CHLORIDE SERPLBLD-SCNC: 96.8 MMOL/L (ref 98–110)
CO2 SERPL-SCNC: 31 MMOL/L (ref 20–32)
CREAT SERPL-MCNC: 0.76 MG/DL (ref 0.6–1.3)
GLOBULIN, CALCULATED - QUEST: 2.9 (ref 1.9–3.7)
GLUCOSE SERPL-MCNC: 128 MG/DL (ref 60–99)
HCT VFR BLD AUTO: 39.8 % (ref 40–53)
HEMOGLOBIN A1C: 6.9 % (ref 4–5.6)
HEMOGLOBIN: 12.5 G/DL (ref 13.3–17.7)
LYMPHOCYTES NFR BLD AUTO: 26.1 %
MCH RBC QN AUTO: 29.6 PG (ref 26–33)
MCHC RBC AUTO-ENTMCNC: 31.4 G/DL (ref 31–36)
MCV RBC AUTO: 94.1 FL (ref 78–100)
MONOCYTES NFR BLD AUTO: 10.6 %
PLATELET COUNT - QUEST: 318 10^9/L (ref 150–375)
POTASSIUM SERPL-SCNC: 4.87 MMOL/L (ref 3.5–5.3)
PROT SERPL-MCNC: 6.9 G/DL (ref 6.1–8.1)
RBC # BLD AUTO: 4.23 10*12/L (ref 4.4–5.9)
SODIUM SERPL-SCNC: 136.4 MMOL/L (ref 135–146)
WBC # BLD AUTO: 6.6 10*9/L (ref 4–11)

## 2024-03-05 PROCEDURE — 83036 HEMOGLOBIN GLYCOSYLATED A1C: CPT | Performed by: FAMILY MEDICINE

## 2024-03-05 PROCEDURE — 99214 OFFICE O/P EST MOD 30 MIN: CPT | Mod: 25 | Performed by: FAMILY MEDICINE

## 2024-03-05 PROCEDURE — 36415 COLL VENOUS BLD VENIPUNCTURE: CPT | Performed by: FAMILY MEDICINE

## 2024-03-05 PROCEDURE — 85025 COMPLETE CBC W/AUTO DIFF WBC: CPT | Performed by: FAMILY MEDICINE

## 2024-03-05 PROCEDURE — G0439 PPPS, SUBSEQ VISIT: HCPCS | Performed by: FAMILY MEDICINE

## 2024-03-05 PROCEDURE — 80053 COMPREHEN METABOLIC PANEL: CPT | Performed by: FAMILY MEDICINE

## 2024-03-05 RX ORDER — LISINOPRIL 40 MG/1
40 TABLET ORAL DAILY
Qty: 90 TABLET | Refills: 1 | Status: SHIPPED | OUTPATIENT
Start: 2024-03-05 | End: 2024-08-26

## 2024-03-05 RX ORDER — OMEPRAZOLE 40 MG/1
40 CAPSULE, DELAYED RELEASE ORAL 2 TIMES DAILY
Qty: 180 CAPSULE | Refills: 1 | Status: SHIPPED | OUTPATIENT
Start: 2024-03-05 | End: 2024-08-26

## 2024-03-05 RX ORDER — DAPAGLIFLOZIN 10 MG/1
10 TABLET, FILM COATED ORAL DAILY
Qty: 90 TABLET | Refills: 1 | Status: SHIPPED | OUTPATIENT
Start: 2024-03-05 | End: 2024-05-31 | Stop reason: ALTCHOICE

## 2024-03-05 NOTE — PROGRESS NOTES
"  Jovi Aldana is a 78 year old male who presents for Medicare Annual Wellness Visit.    Current providers caring for this patient include:  Patient Care Team:  Josafat Marroquin MD as PCP - General (Family Medicine)  Josafat Marroquin MD as Assigned PCP  Irwin Cho MD as Assigned Surgical Provider  Seth Rhoades MD as MD (Nephrology)    Complete Medical and Social history reviewed with patient, outlined below.    Patient Active Problem List   Diagnosis    Trigeminal neuralgia    Primary hypertension    Type 2 diabetes mellitus with complication, without long-term current use of insulin (H)    Arthritis    Sleep apnea    Hyperlipidemia    Venous insufficiency    Coronary artery disease    Adrenal mass (H24)    Health Care Home    Morbid obesity (H)    Supraventricular tachycardia    Cholecystitis    Basal cell carcinoma of lip    Cerebrovascular accident (CVA) due to occlusion of basilar artery (H)    Chest pain    Fibromyositis    Hyperlipidemia type II    Kidney stones    Multiple lung nodules    Myofascial pain    Neck pain    Arthralgia of temporomandibular joint    Hyponatremia       Past Medical History:   Diagnosis Date    Adrenal mass (H24)     Arthritis     Complication of anesthesia     \"hard time waking up\" after vein stripping    Coronary artery disease     Diabetes mellitus (H)     Gout     Heartburn     Hyperlipidemia     Hypertension     Mumps     PONV (postoperative nausea and vomiting)     Renal disease     stone    Sleep apnea     CPAP    Venous insufficiency        Past Surgical History:   Procedure Laterality Date    COLONOSCOPY      COLONOSCOPY  5/15/2012    Procedure:COLONOSCOPY; COLONOSCOPY; Surgeon:ALICIA HART; Location: GI    CYSTOSCOPY N/A 8/11/2020    Procedure: (1) CYSTOSCOPY, exam under anesthesia, urethral dialation;  Surgeon: Saurav Mane MD;  Location:  OR    ESOPHAGOSCOPY, GASTROSCOPY, DUODENOSCOPY (EGD), COMBINED N/A 5/2/2018    Procedure: " "COMBINED ESOPHAGOSCOPY, GASTROSCOPY, DUODENOSCOPY (EGD);  ESOPHAGOSCOPY, GASTROSCOPY, DUODENOSCOPY with biopsies, and esophogeal dilation. ;  Surgeon: Mic Gilman MD;  Location: [facility] OR    GENITOURINARY SURGERY  1989    lithrotripsy    LAPAROSCOPIC CHOLECYSTECTOMY N/A 8/11/2020    Procedure: (2) LAPAROSCOPIC CHOLECYSTECTOMY;  Surgeon: Floyd Smith MD;  Location: [facility] OR    ORTHOPEDIC SURGERY  1989    laminectomy L 4-5    ORTHOPEDIC SURGERY  1996    disectomy     VASCULAR SURGERY      vein stripping       Family History   Problem Relation Age of Onset    Prostate Cancer Father     Coronary Artery Disease No family hx of        Social History     Tobacco Use    Smoking status: Never     Passive exposure: Never    Smokeless tobacco: Never   Substance Use Topics    Alcohol use: No       Diet: regular, low salt/low fat  Physical Activity: generally inactive  Depression Screen:    Over the past 2 weeks, patient has felt down, depressed, or hopeless:  No    Over the past 2 weeks, patient has felt little interest or pleasure in doing things: No    Functional ability/Safety screen:  Up and go test (able to get up and walk longer than 30 seconds): Passed  Patient needs assistance with: nothing  Patient's home has the following possible safety concerns: none identified  Patient has concerns about his hearing:  No  Cognitive Screen  Patient repeats three objects (ball, flag, tree)      Clock drawing test:   NORMAL  Recalls three objects after 3 minutes (ball,flag,tree):                                                                                               recalls 3 objects (3 points)    Physical Exam:  BP (!) 144/62 (BP Location: Right arm, Patient Position: Chair, Cuff Size: Adult Large)   Pulse 72   Temp 97.1  F (36.2  C) (Temporal)   Resp 20   Ht 1.854 m (6' 1\")   Wt (!) 153.3 kg (338 lb)   BMI 44.59 kg/m     Body mass index is 44.59 kg/m .               End of Life Planning:   Patient currently has an " advanced directive: Yes.  Practitioner is supportive of decision.    Education/Counseling:   Based on review of the above information, the following items were addressed:      Obesity - appropriate counseling on diet, exercise, etc.      Elevated blood pressure - follow-up plans made      Diabetes -  access to diabetes self-management training, medical nutrition therapy, and treatment    Appropriate preventive services were discussed with this patient, including applicable screening as appropriate for cardiovascular disease, diabetes, osteopenia/osteoporosis, and glaucoma.  As appropriate for age/gender, discussed screening for colorectal cancer, prostate cancer, breast cancer, and cervical cancer.   Checklist reviewing preventive services available has been given to the patient.               Assessment & Plan     Type 2 diabetes mellitus with complication, without long-term current use of insulin (H)  Controlled, continue current medications at current doses   - HEMOGLOBIN A1C ([facility])  - VENOUS COLLECTION  - Comprehensive Metobolic Panel ([facility])    Mixed hyperlipidemia  Control uncertain, not fasting  - Comprehensive Metobolic Panel ([facility])    Essential hypertension  Overall good control, working with nephrology as well, continue current medications at current doses   - lisinopril (ZESTRIL) 40 MG tablet  Dispense: 90 tablet; Refill: 1  - Comprehensive Metobolic Panel ([facility])    Syndrome of inappropriate ADH production (H24)  Await labs, may need to decrease carbamazepine if lower-currently taking 7 pills daily to manage TN  - FARXIGA 10 MG TABS tablet  Dispense: 90 tablet; Refill: 1    Cerebrovascular accident (CVA) due to occlusion of basilar artery (H)      Trigeminal neuralgia  Well controlled but using 7 carbamazepine daily    Peripheral polyneuropathy  Doing better with epsom bath    Gastroesophageal reflux disease with esophagitis without hemorrhage  Noting more reflux after meals- suspect this may be partially  related to medications-recommend he see MTM for med review  - omeprazole (PRILOSEC) 40 MG DR capsule  Dispense: 180 capsule; Refill: 1      Acquired lymphedema of lower extremity  Doing well- face to face encounter today, continue support hose-needs new pair  - Compression Sleeve/Stocking Order for DME - ONLY FOR DME    Screening for diabetic peripheral neuropathy    - FOOT EXAM  NO CHARGE [89687.114]    Encounter for annual wellness exam in Medicare patient  discussed preventitive healthcare Continue to work on healthy diet and exercise, discussed healthy habits     Benign prostatic hyperplasia with lower urinary tract symptoms, symptom details unspecified  stable  - PSA Total ([facility])    KANIKA (obstructive sleep apnea)  cpap    Fatigue, unspecified type  Likely multifactorial- check CBC  - HEMOGRAM PLATELET DIFF ([facility])      Review of the result(s) of each unique test - labs  Ordering of each unique test  Prescription drug management          FUTURE APPOINTMENTS:       - Follow-up visit in 6 mo  Work on weight loss  Regular exercise    No follow-ups on file.    Subjective   Peterson is a 78 year old, presenting for the following health issues:  Recheck Medication    HPI       Diabetes Follow-up    How often are you checking your blood sugar? One time daily  What time of day are you checking your blood sugars (select all that apply)?  Before meals  Have you had any blood sugars above 200?  No  Have you had any blood sugars below 70?  No  What symptoms do you notice when your blood sugar is low?  Shaky  What concerns do you have today about your diabetes? None   Do you have any of these symptoms? (Select all that apply)  Numbness in feet          Hyperlipidemia Follow-Up    Are you regularly taking any medication or supplement to lower your cholesterol?   No  Are you having muscle aches or other side effects that you think could be caused by your cholesterol lowering medication?  No    Hypertension Follow-up  most  "130's-140's/60's    Do you check your blood pressure regularly outside of the clinic? Yes   Are you following a low salt diet? Yes  Are your blood pressures ever more than 140 on the top number (systolic) OR more   than 90 on the bottom number (diastolic), for example 140/90? Yes    BP Readings from Last 2 Encounters:   03/05/24 (!) 144/62   12/29/23 (!) 154/60     Hemoglobin A1C (%)   Date Value   08/01/2023 6.0   02/14/2023 7.1 (A)     LDL Cholesterol Calculated (mg/dL)   Date Value   08/09/2017 178 (H)   01/30/2017 184 (H)     LDL Cholesterol Direct (mg/dL)   Date Value   06/16/2022 163 (A)   08/23/2021 143 (A)         Pt noting some nausea after meals- seems ot be since Farxiga started    Pt has TN- on carbamazepine 7 daily for now- had low sodium in past felt to be related, needs recheck- goal to decrease down from 7 pills     Pt lymphedema stable- using vanacream and Jobst stockings    Peripheral neuropathy- trying foot bath with epsom- helping  How many servings of fruits and vegetables do you eat daily?  2-3  On average, how many sweetened beverages do you drink each day (Examples: soda, juice, sweet tea, etc.  Do NOT count diet or artificially sweetened beverages)?   1  How many days per week do you exercise enough to make your heart beat faster? 3 or less  How many minutes a day do you exercise enough to make your heart beat faster? 9 or less  How many days per week do you miss taking your medication? 0        Review of Systems  Constitutional, HEENT, cardiovascular, pulmonary, gi and gu systems are negative, except as otherwise noted.      Objective    BP (!) 144/62 (BP Location: Right arm, Patient Position: Chair, Cuff Size: Adult Large)   Pulse 72   Temp 97.1  F (36.2  C) (Temporal)   Resp 20   Ht 1.854 m (6' 1\")   Wt (!) 153.3 kg (338 lb)   BMI 44.59 kg/m    Body mass index is 44.59 kg/m .  Physical Exam   GENERAL: alert, no distress, and obese  EYES: Eyes grossly normal to inspection, PERRL and " conjunctivae and sclerae normal  HENT: ear canals and TM's normal, nose and mouth without ulcers or lesions  NECK: no adenopathy, no asymmetry, masses, or scars  RESP: lungs clear to auscultation - no rales, rhonchi or wheezes  CV: regular rate and rhythm, normal S1 S2, no S3 or S4, no murmur, click or rub, no peripheral edema  ABDOMEN: soft, nontender, no hepatosplenomegaly, no masses and bowel sounds normal  MS: no gross musculoskeletal defects noted, no edema  PSYCH: mentation appears normal, affect normal/bright  FOOT: pt with preulcereative callous noted, decreased distal sensation to light touch    Results for orders placed or performed in visit on 03/05/24 (from the past 24 hour(s))   HEMOGLOBIN A1C ([facility])   Result Value Ref Range    Hemoglobin A1C 6.9 (A) 4 - 5.6 %           Signed Electronically by: Josafat Marroquin MD

## 2024-03-05 NOTE — NURSING NOTE
Jovi Aldana is here for a medication check and Wellness visit.    Questioned patient about current smoking habits.  Pt. has never smoked.  PULSE regular  My Chart: active  CLASSIFICATION OF OVERWEIGHT AND OBESITY BY BMI                        Obesity Class           BMI(kg/m2)  Underweight                                    < 18.5  Normal                                         18.5-24.9  Overweight                                     25.0-29.9  OBESITY                     I                  30.0-34.9                             II                 35.0-39.9  EXTREME OBESITY             III                >40                            Patient's  BMI Body mass index is 44.59 kg/m .  http://hin.nhlbi.nih.gov/menuplanner/menu.cgi  Pre-visit planning  Immunizations - up to date  Colonoscopy -   Mammogram -   Asthma -   PHQ9 -    RHODA-7 -      The patient has verbalized that it is ok to leave a detailed voice message on the patient's cell phone with results/recommendations from this visit. '

## 2024-03-14 ENCOUNTER — OFFICE VISIT (OUTPATIENT)
Dept: FAMILY MEDICINE | Facility: CLINIC | Age: 79
End: 2024-03-14

## 2024-03-14 DIAGNOSIS — E22.2: Primary | ICD-10-CM

## 2024-03-14 NOTE — PROGRESS NOTES
SUBJECTIVE/OBJECTIVE:                Jovi Aldana is a 78 year old male seen for a follow-up visit for Medication Management Services.  He was referred to me from Dr. Josafat Marroquin.     Chief Complaint: Follow up from [facility] visit on 06/23/2022.      Nausea:  Current Medications:  Current Outpatient Medications   Medication    amLODIPine (NORVASC) 2.5 MG tablet    aspirin 81 MG tablet    baclofen (LIORESAL) 10 MG tablet    blood glucose (NO BRAND SPECIFIED) lancets standard    blood glucose (NO BRAND SPECIFIED) test strip    carBAMazepine (TEGRETOL XR) 200 MG 12 hr tablet    cetirizine (ZYRTEC) 10 MG tablet    clobetasol (TEMOVATE) 0.05 % ointment    cloNIDine (CATAPRES) 0.1 MG tablet    clotrimazole-betamethasone (LOTRISONE) 1-0.05 % external cream    Desonide Crea-Wound Dress Crea (DESONIL CREAM EX)    FARXIGA 10 MG TABS tablet    fexofenadine (ALLEGRA) 180 MG tablet    finasteride (PROSCAR) 5 MG tablet    furosemide (LASIX) 20 MG tablet    gabapentin (NEURONTIN) 300 MG capsule    JANUMET  MG tablet    ketoconazole (NIZORAL) 2 % external cream    lisinopril (ZESTRIL) 40 MG tablet    Magnesium 400 MG CAPS    medical cannabis (Patient's own supply)    niacin (SLO-NIACIN) 250 MG TBCR CR tablet    omeprazole (PRILOSEC) 40 MG DR capsule    Polyethylene Glycol 3350 (MIRALAX PO)    prazosin (MINIPRESS) 1 MG capsule    PSYLLIUM HUSK PO    senna-docusate (SENOKOT-S/PERICOLACE) 8.6-50 MG tablet    sodium chloride 1 GM tablet    VITAMIN D, CHOLECALCIFEROL, PO    vitamin E 400 UNITS TABS    zinc sulfate (ZINCATE) 220 (50 Zn) MG capsule     No current facility-administered medications for this visit.       We discussed the benefits and risks of each medication.  Patient Questions/Concerns:  Increased nausea since start of Farxiga        ASSESSMENT/PLAN:                Nausea:  Assessment: Patient has experienced nausea since starting Farxiga. Farxiga has helped with blood sugars as well as sodium regulation.  Would like to continue with SGLT-2 at this time, but discussed trying different SGLT-2 to minimize nausea patient is experiencing. Invokana has similar side effect profile. Jardiance has significantly less nausea associated with use.    Patient had tried Jardiance in the past and wife notes had yeast infection which was treated at that time. Still using ketoconazole cream for prevention. I think it would be reasonable to trial Jardiance again to see if nausea resolves. Will discuss this with Dr. Marroquin and Dr. Calloway prior to switch. Patient just picked up 3 month supply and would like to complete this due to cost prior to switching to Jardiance    Status: Sodium level remaining stable, Farxiga likely causing nausea  Drug Therapy Problems:  1) Farxiga side effect  Plan:  1) Contact Dr. Calloway to see if Jardiance       I spent 1 hour with this patient today.  All changes were made via collaborative practice agreement with Josafat Marroquin. A copy of the visit note was provided to the patient's primary care provider.    Martha Collier, PharmD  Clinical Pharmacist  [facility] Phone: [phone]  Direct Office Phone: [phone]          3-4 times a day nausea, when near a meal.    tiredness

## 2024-03-20 ENCOUNTER — OFFICE VISIT (OUTPATIENT)
Dept: FAMILY MEDICINE | Facility: CLINIC | Age: 79
End: 2024-03-20

## 2024-03-20 VITALS
BODY MASS INDEX: 44.86 KG/M2 | SYSTOLIC BLOOD PRESSURE: 152 MMHG | HEART RATE: 81 BPM | TEMPERATURE: 98.1 F | OXYGEN SATURATION: 96 % | WEIGHT: 315 LBS | DIASTOLIC BLOOD PRESSURE: 84 MMHG

## 2024-03-20 DIAGNOSIS — L73.9 FOLLICULITIS: Primary | ICD-10-CM

## 2024-03-20 PROCEDURE — G2211 COMPLEX E/M VISIT ADD ON: HCPCS | Performed by: PHYSICIAN ASSISTANT

## 2024-03-20 PROCEDURE — 99213 OFFICE O/P EST LOW 20 MIN: CPT | Performed by: PHYSICIAN ASSISTANT

## 2024-03-20 NOTE — NURSING NOTE
Chief Complaint   Patient presents with    boil     Boil/ cyst on left side near groin that is now red and irritated, now developed a lump, soreness, first noticed on Monday      Pre-visit Screening:  Immunizations:  not up to date - shingrix at pharmacy  Colonoscopy:  is up to date  Mammogram: NA  Asthma Action Test/Plan:  NA  PHQ9:  NA  GAD7:  NA  Questioned patient about current smoking habits Pt. has never smoked.  Ok to leave detailed message on voice mail for today's visit only Yes, phone # [phone]

## 2024-03-20 NOTE — PROGRESS NOTES
"  Assessment & Plan     Folliculitis-suspect follicullitis vs sebaceous cyst, limited, appears to be healing. No concerns for abscess at this time, will treat hard area with warm compresses to facilitate drainage and await improvement  No need for systemic AB at this time-call if any worsening and would consider one based on pt's extensive allergies  Topical OTC antibiotic BID  Warm compress 3x a day  Call with any worsening      Follow up as needed    No follow-ups on file.    Jose Alfredo Winkler is a 78 year old, presenting for the following health issues:  boil (Boil/ cyst on left side near groin that is now red and irritated, now developed a lump, soreness, first noticed on Monday )    HPI     Pt presents with skin issue. Started 3 days ago, in R groin. Sudden onset with mild pain during a shower. Mild pain at this time. No fevers/chills with this issue. No discharge out of the area. Has tried some hot packs, clobetasol, desonide. Area is slightly improving overall. Area is about the size of a dime.         Review of Systems  Constitutional, HEENT, cardiovascular, pulmonary, gi and gu systems are negative, except as otherwise noted.      Objective    BP (!) 152/84 (BP Location: Right arm, Patient Position: Sitting, Cuff Size: Adult Large)   Pulse 81   Temp 98.1  F (36.7  C) (Temporal)   Wt (!) 154.2 kg (340 lb)   SpO2 96%   BMI 44.86 kg/m    Body mass index is 44.86 kg/m .  Physical Exam   GENERAL: alert and no distress  NECK: no adenopathy, no asymmetry, masses, or scars  RESP: lungs clear to auscultation - no rales, rhonchi or wheezes  CV: regular rate and rhythm, normal S1 S2, no S3 or S4, no murmur, click or rub, no peripheral edema  ABDOMEN: soft, nontender, no hepatosplenomegaly, no masses and bowel sounds normal  MS: no gross musculoskeletal defects noted, no edema  SKIN: R groin: small folliculitis with mild hardness noted, 1\" x 1/4\" with some associated redness, appears improved vs pictures on phone " from last few days            Signed Electronically by: Atul Silver PA-C

## 2024-03-21 DIAGNOSIS — E11.8 TYPE 2 DIABETES MELLITUS WITH COMPLICATION, WITHOUT LONG-TERM CURRENT USE OF INSULIN (H): ICD-10-CM

## 2024-03-21 RX ORDER — GABAPENTIN 300 MG/1
300 CAPSULE ORAL 4 TIMES DAILY
Qty: 360 CAPSULE | Refills: 1 | Status: SHIPPED | OUTPATIENT
Start: 2024-03-21 | End: 2024-08-29

## 2024-04-02 DIAGNOSIS — E87.1 HYPOSMOLALITY SYNDROME: Primary | ICD-10-CM

## 2024-04-02 DIAGNOSIS — E22.2 SIADH (SYNDROME OF INAPPROPRIATE ADH PRODUCTION) (H): ICD-10-CM

## 2024-04-02 LAB
BUN SERPL-MCNC: 12 MG/DL (ref 7–25)
BUN/CREATININE RATIO: 14.5 (ref 6–32)
CALCIUM SERPL-MCNC: 9.3 MG/DL (ref 8.6–10.3)
CHLORIDE SERPLBLD-SCNC: 96.7 MMOL/L (ref 98–110)
CO2 SERPL-SCNC: 34.8 MMOL/L (ref 20–32)
CREAT SERPL-MCNC: 0.83 MG/DL (ref 0.6–1.3)
GLUCOSE SERPL-MCNC: 131 MG/DL (ref 60–99)
POTASSIUM SERPL-SCNC: 5.28 MMOL/L (ref 3.5–5.3)
SODIUM SERPL-SCNC: 137.3 MMOL/L (ref 135–146)

## 2024-04-02 PROCEDURE — 80048 BASIC METABOLIC PNL TOTAL CA: CPT | Performed by: FAMILY MEDICINE

## 2024-04-02 PROCEDURE — 36415 COLL VENOUS BLD VENIPUNCTURE: CPT | Performed by: FAMILY MEDICINE

## 2024-04-02 NOTE — NURSING NOTE
pt here for lab only  Non fasting   Non fasting      Lab only non fasting      Order in file  [facility]  Dr. Luis A Calloway  Phone [phone]     Fax [phone]     Will fax when results are back

## 2024-04-30 DIAGNOSIS — N40.1 BENIGN PROSTATIC HYPERPLASIA WITH LOWER URINARY TRACT SYMPTOMS, SYMPTOM DETAILS UNSPECIFIED: ICD-10-CM

## 2024-04-30 LAB
BUN SERPL-MCNC: 12 MG/DL (ref 7–25)
BUN/CREATININE RATIO: 14.5 (ref 6–32)
CALCIUM SERPL-MCNC: 8.9 MG/DL (ref 8.6–10.3)
CHLORIDE SERPLBLD-SCNC: 96.9 MMOL/L (ref 98–110)
CO2 SERPL-SCNC: 28.7 MMOL/L (ref 20–32)
CREAT SERPL-MCNC: 0.83 MG/DL (ref 0.6–1.3)
GLUCOSE SERPL-MCNC: 150 MG/DL (ref 60–99)
POTASSIUM SERPL-SCNC: 5.17 MMOL/L (ref 3.5–5.3)
SODIUM SERPL-SCNC: 136.7 MMOL/L (ref 135–146)

## 2024-04-30 PROCEDURE — 36415 COLL VENOUS BLD VENIPUNCTURE: CPT | Performed by: FAMILY MEDICINE

## 2024-04-30 PROCEDURE — 80048 BASIC METABOLIC PNL TOTAL CA: CPT | Performed by: FAMILY MEDICINE

## 2024-04-30 NOTE — NURSING NOTE
Non fasting      Lab only non fasting      Order in file  [facility]  Dr. Luis A Calloway  Phone [phone]     Fax [phone]     Will fax when results are back        Orders also from [facility] psa in future   Pt wants this faxed to Dr. Cho  [facility]  [phone]

## 2024-05-01 LAB — ABBOTT PSA - QUEST: 0.38 NG/ML

## 2024-05-02 ENCOUNTER — OFFICE VISIT (OUTPATIENT)
Dept: UROLOGY | Facility: CLINIC | Age: 79
End: 2024-05-02
Payer: COMMERCIAL

## 2024-05-02 VITALS
BODY MASS INDEX: 41.75 KG/M2 | HEIGHT: 73 IN | DIASTOLIC BLOOD PRESSURE: 75 MMHG | HEART RATE: 86 BPM | WEIGHT: 315 LBS | SYSTOLIC BLOOD PRESSURE: 151 MMHG

## 2024-05-02 DIAGNOSIS — N48.83 ACQUIRED BURIED PENIS: ICD-10-CM

## 2024-05-02 DIAGNOSIS — N40.1 BENIGN PROSTATIC HYPERPLASIA WITH URINARY FREQUENCY: Primary | ICD-10-CM

## 2024-05-02 DIAGNOSIS — R35.0 BENIGN PROSTATIC HYPERPLASIA WITH URINARY FREQUENCY: Primary | ICD-10-CM

## 2024-05-02 DIAGNOSIS — Z80.42 FAMILY HISTORY OF PROSTATE CANCER IN FATHER: ICD-10-CM

## 2024-05-02 LAB
ALBUMIN UR-MCNC: NEGATIVE MG/DL
APPEARANCE UR: CLEAR
BILIRUB UR QL STRIP: NEGATIVE
COLOR UR AUTO: YELLOW
GLUCOSE UR STRIP-MCNC: 500 MG/DL
HGB UR QL STRIP: NEGATIVE
KETONES UR STRIP-MCNC: NEGATIVE MG/DL
LEUKOCYTE ESTERASE UR QL STRIP: NEGATIVE
NITRATE UR QL: NEGATIVE
PH UR STRIP: 5.5 [PH] (ref 5–7)
RESIDUAL VOLUME (RV) (EXTERNAL): 27
SP GR UR STRIP: 1.01 (ref 1–1.03)
UROBILINOGEN UR STRIP-ACNC: 0.2 E.U./DL

## 2024-05-02 PROCEDURE — 81003 URINALYSIS AUTO W/O SCOPE: CPT | Mod: QW | Performed by: STUDENT IN AN ORGANIZED HEALTH CARE EDUCATION/TRAINING PROGRAM

## 2024-05-02 PROCEDURE — 99214 OFFICE O/P EST MOD 30 MIN: CPT | Mod: 25 | Performed by: STUDENT IN AN ORGANIZED HEALTH CARE EDUCATION/TRAINING PROGRAM

## 2024-05-02 PROCEDURE — 51798 US URINE CAPACITY MEASURE: CPT | Performed by: STUDENT IN AN ORGANIZED HEALTH CARE EDUCATION/TRAINING PROGRAM

## 2024-05-02 RX ORDER — FINASTERIDE 5 MG/1
1 TABLET, FILM COATED ORAL DAILY
Qty: 90 TABLET | Refills: 3 | Status: SHIPPED | OUTPATIENT
Start: 2024-05-02 | End: 2024-05-02

## 2024-05-02 RX ORDER — FINASTERIDE 5 MG/1
1 TABLET, FILM COATED ORAL DAILY
Qty: 90 TABLET | Refills: 3 | Status: SHIPPED | OUTPATIENT
Start: 2024-05-02

## 2024-05-02 ASSESSMENT — PAIN SCALES - GENERAL: PAINLEVEL: SEVERE PAIN (7)

## 2024-05-02 NOTE — PROGRESS NOTES
"CHIEF COMPLAINT   Jovi Aldana who is a 79 year old male returns today for follow-up of BPH with LUTS, microhematuria, + FH prostate cancer in father.      Here with wife    HPI   Jovi Aldana is a 79 year old male returns today for follow-up of BPH with LUTS, microhematuria, + FH prostate cancer in father.      AUA symptom score 9-4-0-5-3-0-1 = 20 qol mostly dissatisfied.    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    PHYSICAL EXAM  Patient is a 79 year old  male   Vitals: Blood pressure (!) 151/75, pulse 86, height 1.854 m (6' 1\"), weight (!) 154.2 kg (340 lb).  Body mass index is 44.86 kg/m .  General Appearance Adult:   Alert, no acute distress, oriented  HENT: throat/mouth:normal, good dentition  Lungs: no respiratory distress, or pursed lip breathing  Heart: No obvious jugular venous distension present  Abdomen: morbid obesity   Musculoskeltal: extremities normal, no peripheral edema  Skin: no suspicious lesions or rashes  Neuro: Alert, oriented, speech and mentation normal  Psych: affect and mood normal  Gait: Normal  : buried penis, I was able to easily deliver it out there is no phimosis. The meatus appears patent    PSA 0.38 4/30/2024    PVR 27 ml    Component      Latest Ref Rng 5/2/2024  10:07 AM   Color Urine      Colorless, Straw, Light Yellow, Yellow  Yellow    Appearance Urine      Clear  Clear    Glucose Urine      Negative mg/dL 500 !    Bilirubin Urine      Negative  Negative    Ketones Urine      Negative mg/dL Negative    Specific Gravity Urine      1.003 - 1.035  1.015    Blood Urine      Negative  Negative    pH Urine      5.0 - 7.0  5.5    Protein Albumin Urine      Negative mg/dL Negative    Urobilinogen Urine      0.2, 1.0 " E.U./dL 0.2    Nitrite Urine      Negative  Negative    Leukocyte Esterase Urine      Negative  Negative       Legend:  ! Abnormal    ASSESSMENT and PLAN  79 year old male returns today for follow-up of BPH with LUTS, microhematuria, + FH prostate cancer in father.      Re: BPH with urinary urgency and frequency, patient is on SGLT-2 inhibitor for his hyponatremia, this almost certainly is contributing to his symptoms (due to mechanism of action, causing glucosuria). He is emptying his bladder well with pvr 27 ml. He says he is adherent to fluid restriction for his hyponatremia. Recommend continue finasteride indefinitely. His PSA is low at 0.38 ng/ml. He has a + FH prostate cancer in father so reasonable to continue checking if he desires    Re: buried penis, no specific intervention for this asides from weight loss (note his morbid obesity)    - refill finasteride (rx drug management), primary care provider can take over further refills  - return as needed if worsening symptoms or concerning rise in PSA    Irwin Cho MD   [facility]  [facility] Phone: [phone]

## 2024-05-02 NOTE — LETTER
"5/2/2024       RE: Jovi Aldana  [address] [address]     Dear Colleague,    Thank you for referring your patient, Jovi Aldana, to the [facility] [place] at [facility]. Please see a copy of my visit note below.    CHIEF COMPLAINT   Jovi Aldana who is a 79 year old male returns today for follow-up of BPH with LUTS, microhematuria, + FH prostate cancer in father.      Here with wife    HPI   Jovi lAdana is a 79 year old male returns today for follow-up of BPH with LUTS, microhematuria, + FH prostate cancer in father.      AUA symptom score 1-7-5-5-3-0-1 = 20 qol mostly dissatisfied.    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    PHYSICAL EXAM  Patient is a 79 year old  male   Vitals: Blood pressure (!) 151/75, pulse 86, height 1.854 m (6' 1\"), weight (!) 154.2 kg (340 lb).  Body mass index is 44.86 kg/m .  General Appearance Adult:   Alert, no acute distress, oriented  HENT: throat/mouth:normal, good dentition  Lungs: no respiratory distress, or pursed lip breathing  Heart: No obvious jugular venous distension present  Abdomen: morbid obesity   Musculoskeltal: extremities normal, no peripheral edema  Skin: no suspicious lesions or rashes  Neuro: Alert, oriented, speech and mentation normal  Psych: affect and mood normal  Gait: Normal  : buried penis, I was able to easily deliver it out there is no phimosis. The meatus appears patent    PSA 0.38 4/30/2024    PVR 27 ml    Component      Latest Ref Rng 5/2/2024  10:07 AM   Color Urine      Colorless, Straw, Light Yellow, Yellow  Yellow    Appearance Urine      Clear  Clear    Glucose Urine      Negative mg/dL " 500 !    Bilirubin Urine      Negative  Negative    Ketones Urine      Negative mg/dL Negative    Specific Gravity Urine      1.003 - 1.035  1.015    Blood Urine      Negative  Negative    pH Urine      5.0 - 7.0  5.5    Protein Albumin Urine      Negative mg/dL Negative    Urobilinogen Urine      0.2, 1.0 E.U./dL 0.2    Nitrite Urine      Negative  Negative    Leukocyte Esterase Urine      Negative  Negative       Legend:  ! Abnormal    ASSESSMENT and PLAN  79 year old male returns today for follow-up of BPH with LUTS, microhematuria, + FH prostate cancer in father.      Re: BPH with urinary urgency and frequency, patient is on SGLT-2 inhibitor for his hyponatremia, this almost certainly is contributing to his symptoms (due to mechanism of action, causing glucosuria). He is emptying his bladder well with pvr 27 ml. He says he is adherent to fluid restriction for his hyponatremia. Recommend continue finasteride indefinitely. His PSA is low at 0.38 ng/ml. He has a + FH prostate cancer in father so reasonable to continue checking if he desires    Re: buried penis, no specific intervention for this asides from weight loss (note his morbid obesity)    - refill finasteride (rx drug management), primary care provider can take over further refills  - return as needed if worsening symptoms or concerning rise in PSA    Irwin Cho MD   [facility]  [facility] Phone: [phone]

## 2024-05-02 NOTE — NURSING NOTE
Chief Complaint   Patient presents with    Benign Prostatic Hypertrophy     Pt here for 1 year follow up      PVR: 27 mL    Ariela Johnson CMA

## 2024-05-10 DIAGNOSIS — I10 ESSENTIAL HYPERTENSION: ICD-10-CM

## 2024-05-10 NOTE — TELEPHONE ENCOUNTER
Please advise, pt last seen 03/05/24 your note does not mention amlodipine.    Jovi Aldana is requesting a refill of:    Pending Prescriptions:                       Disp   Refills    amLODIPine (NORVASC) 2.5 MG tablet [Pharm*90 tab*0            Sig: TAKE 1 TABLET BY MOUTH EVERY DAY

## 2024-05-13 RX ORDER — AMLODIPINE BESYLATE 2.5 MG/1
2.5 TABLET ORAL DAILY
Qty: 90 TABLET | Refills: 0 | Status: SHIPPED | OUTPATIENT
Start: 2024-05-13 | End: 2024-08-08

## 2024-05-17 DIAGNOSIS — E11.8 TYPE 2 DIABETES MELLITUS WITH COMPLICATION, WITHOUT LONG-TERM CURRENT USE OF INSULIN (H): ICD-10-CM

## 2024-05-17 DIAGNOSIS — G50.0 TRIGEMINAL NEURALGIA: ICD-10-CM

## 2024-05-17 RX ORDER — SITAGLIPTIN AND METFORMIN HYDROCHLORIDE 1000; 50 MG/1; MG/1
TABLET, FILM COATED ORAL
Qty: 180 TABLET | Refills: 0 | Status: SHIPPED | OUTPATIENT
Start: 2024-05-17 | End: 2024-08-26

## 2024-05-17 RX ORDER — CARBAMAZEPINE 200 MG/1
TABLET, EXTENDED RELEASE ORAL
Qty: 630 TABLET | Refills: 0 | Status: SHIPPED | OUTPATIENT
Start: 2024-05-17 | End: 2024-08-16

## 2024-05-17 NOTE — TELEPHONE ENCOUNTER
Jovi Aldana is requesting a refill of:    Pending Prescriptions:                       Disp   Refills    carBAMazepine (TEGRETOL XR) 200 MG 12 hr *630 ta*0            Sig: TAKE 2 -3 TABS BY MOUTH DAILY WITH BREAKFAST AND           1 TAB DAILY WITH LUNCH AND 2-3 TABS AT BEDTIME.    JANUMET  MG tablet [Pharmacy Med N*180 ta*0            Sig: TAKE 1 TABLET BY MOUTH TWICE A DAY WITH FOOD    Please close encounter if RX was sent. Thanks, Stephania

## 2024-05-30 ENCOUNTER — TELEPHONE (OUTPATIENT)
Dept: FAMILY MEDICINE | Facility: CLINIC | Age: 79
End: 2024-05-30

## 2024-05-30 NOTE — TELEPHONE ENCOUNTER
Leola nurse from [facility] called stating she checked with the provider and they said it is ok for patient to switch from Farxiga to jardiance.    Leola # [phone]    Thanks, Lali DIAS

## 2024-05-31 ENCOUNTER — TELEPHONE (OUTPATIENT)
Dept: FAMILY MEDICINE | Facility: CLINIC | Age: 79
End: 2024-05-31

## 2024-05-31 ENCOUNTER — MYC REFILL (OUTPATIENT)
Dept: FAMILY MEDICINE | Facility: CLINIC | Age: 79
End: 2024-05-31

## 2024-05-31 DIAGNOSIS — E11.8 TYPE 2 DIABETES MELLITUS WITH COMPLICATION, WITHOUT LONG-TERM CURRENT USE OF INSULIN (H): Primary | ICD-10-CM

## 2024-05-31 NOTE — TELEPHONE ENCOUNTER
Patient had been having side effects with Farxiga and discussed changing to Jariance. Called [facility] to have nephrologist approval. Per Leola, Dr Calloway is fine with the switch from Farxiga to Jardiance. Will send in a new order for Jardiance 25mg every day.    Martha Collier, PharmD  Clinical Pharmacist  [facility] Phone: [phone]  Direct Office Phone: [phone]

## 2024-05-31 NOTE — TELEPHONE ENCOUNTER
Please review form, last chart note and order and sign  Make certain the notes are consistent with the order

## 2024-06-06 ENCOUNTER — TRANSFERRED RECORDS (OUTPATIENT)
Dept: FAMILY MEDICINE | Facility: CLINIC | Age: 79
End: 2024-06-06

## 2024-06-13 DIAGNOSIS — R60.9 EDEMA, UNSPECIFIED TYPE: ICD-10-CM

## 2024-06-13 RX ORDER — FUROSEMIDE 20 MG
10 TABLET ORAL 2 TIMES DAILY
Qty: 90 TABLET | Refills: 0 | Status: SHIPPED | OUTPATIENT
Start: 2024-06-13 | End: 2024-08-29

## 2024-06-13 NOTE — TELEPHONE ENCOUNTER
Jovi Aldana is requesting a refill of:    Pending Prescriptions:                       Disp   Refills    furosemide (LASIX) 20 MG tablet [Pharmacy*90 tab*0            Sig: TAKE 0.5 TABLETS (10 MG) BY MOUTH 2 TIMES DAILY    Potassium   Date Value Ref Range Status   04/30/2024 5.17 3.5 - 5.3 mmol/L Final       
5

## 2024-06-17 PROBLEM — Z76.89 HEALTH CARE HOME: Status: RESOLVED | Noted: 2020-05-05 | Resolved: 2024-06-17

## 2024-06-25 ENCOUNTER — OFFICE VISIT (OUTPATIENT)
Dept: FAMILY MEDICINE | Facility: CLINIC | Age: 79
End: 2024-06-25

## 2024-06-25 VITALS
WEIGHT: 315 LBS | RESPIRATION RATE: 20 BRPM | HEART RATE: 68 BPM | DIASTOLIC BLOOD PRESSURE: 72 MMHG | SYSTOLIC BLOOD PRESSURE: 144 MMHG | BODY MASS INDEX: 45.39 KG/M2 | TEMPERATURE: 97.1 F

## 2024-06-25 DIAGNOSIS — E22.2 SIADH (SYNDROME OF INAPPROPRIATE ADH PRODUCTION) (H): ICD-10-CM

## 2024-06-25 DIAGNOSIS — E87.1 HYPOSMOLALITY SYNDROME: ICD-10-CM

## 2024-06-25 DIAGNOSIS — G50.0 TRIGEMINAL NEURALGIA: Primary | ICD-10-CM

## 2024-06-25 LAB
BUN SERPL-MCNC: 14 MG/DL (ref 7–25)
BUN/CREATININE RATIO: 18 (ref 6–32)
CALCIUM SERPL-MCNC: 9.1 MG/DL (ref 8.6–10.3)
CHLORIDE SERPLBLD-SCNC: 98.2 MMOL/L (ref 98–110)
CO2 SERPL-SCNC: 30.7 MMOL/L (ref 20–32)
CREAT SERPL-MCNC: 0.79 MG/DL (ref 0.6–1.3)
GLUCOSE SERPL-MCNC: 147 MG/DL (ref 60–99)
POTASSIUM SERPL-SCNC: 4.68 MMOL/L (ref 3.5–5.3)
SODIUM SERPL-SCNC: 135.8 MMOL/L (ref 135–146)

## 2024-06-25 PROCEDURE — 99213 OFFICE O/P EST LOW 20 MIN: CPT | Performed by: FAMILY MEDICINE

## 2024-06-25 PROCEDURE — 80048 BASIC METABOLIC PNL TOTAL CA: CPT | Performed by: FAMILY MEDICINE

## 2024-06-25 PROCEDURE — 36415 COLL VENOUS BLD VENIPUNCTURE: CPT | Performed by: FAMILY MEDICINE

## 2024-06-25 RX ORDER — MUPIROCIN 20 MG/G
OINTMENT TOPICAL
COMMUNITY
Start: 2024-05-21

## 2024-06-25 RX ORDER — MOMETASONE FUROATE 1 MG/G
CREAM TOPICAL
COMMUNITY

## 2024-06-25 RX ORDER — VALACYCLOVIR HYDROCHLORIDE 1 G/1
TABLET, FILM COATED ORAL
COMMUNITY
Start: 2024-05-13

## 2024-06-25 NOTE — PROGRESS NOTES
"79year old male who complains of nasal blockage, facial pressure, right sinus pain, and left ear pain for 5 days. He denies a history of productive cough, sweats, chills, myalgias, shortness of breath, vomiting, and chest pain and denies a history of asthma. Patient does not smoke cigarettes.     Pt does have hx Trigeminal neuralgia- treated by pain clinic with gabapentin and carbamazepine-will cause pain in left side of face as well- pt does note he decreased dose of carbamazepine recently    Patient Active Problem List   Diagnosis    Trigeminal neuralgia    Primary hypertension    Type 2 diabetes mellitus with complication, without long-term current use of insulin (H)    Arthritis    Sleep apnea    Hyperlipidemia    Venous insufficiency    Coronary artery disease    Adrenal mass (H24)    Morbid obesity (H)    Supraventricular tachycardia (H24)    Cholecystitis    Basal cell carcinoma of lip    Cerebrovascular accident (CVA) due to occlusion of basilar artery (H)    Chest pain    Fibromyositis    Hyperlipidemia type II    Kidney stones    Multiple lung nodules    Myofascial pain    Neck pain    Arthralgia of temporomandibular joint    Hyponatremia       Past Medical History:   Diagnosis Date    Adrenal mass (H24)     Arthritis     Complication of anesthesia     \"hard time waking up\" after vein stripping    Coronary artery disease     Diabetes mellitus (H)     Gout     Heartburn     Hyperlipidemia     Hypertension     Mumps     PONV (postoperative nausea and vomiting)     Renal disease     stone    Sleep apnea     CPAP    Venous insufficiency        Family History   Problem Relation Age of Onset    Prostate Cancer Father     Coronary Artery Disease No family hx of        Social History     Socioeconomic History    Marital status:      Spouse name: Not on file    Number of children: Not on file    Years of education: Not on file    Highest education level: Not on file   Occupational History    Not on file "   Tobacco Use    Smoking status: Never     Passive exposure: Never    Smokeless tobacco: Never   Substance and Sexual Activity    Alcohol use: No    Drug use: Yes     Comment: CBD gummy for sleep    Sexual activity: Not on file   Other Topics Concern    Parent/sibling w/ CABG, MI or angioplasty before 65F 55M? Not Asked     Service Not Asked    Blood Transfusions Not Asked    Caffeine Concern Not Asked    Occupational Exposure Not Asked    Hobby Hazards Not Asked    Sleep Concern Not Asked    Stress Concern Not Asked    Weight Concern Not Asked    Special Diet No    Back Care Not Asked    Exercise Yes    Bike Helmet Not Asked    Seat Belt Not Asked    Self-Exams Not Asked   Social History Narrative    Not on file     Social Determinants of Health     Financial Resource Strain: Not on file   Food Insecurity: Not on file   Transportation Needs: Not on file   Physical Activity: Not on file   Stress: Not on file   Social Connections: Not on file   Interpersonal Safety: Not on file   Housing Stability: Not on file       Past Surgical History:   Procedure Laterality Date    COLONOSCOPY      COLONOSCOPY  5/15/2012    Procedure:COLONOSCOPY; COLONOSCOPY; Surgeon:ALICIA HART; Location: GI    CYSTOSCOPY N/A 8/11/2020    Procedure: (1) CYSTOSCOPY, exam under anesthesia, urethral dialation;  Surgeon: Saurav Mane MD;  Location:  OR    ESOPHAGOSCOPY, GASTROSCOPY, DUODENOSCOPY (EGD), COMBINED N/A 5/2/2018    Procedure: COMBINED ESOPHAGOSCOPY, GASTROSCOPY, DUODENOSCOPY (EGD);  ESOPHAGOSCOPY, GASTROSCOPY, DUODENOSCOPY with biopsies, and esophogeal dilation. ;  Surgeon: Mic Gilman MD;  Location:  OR    GENITOURINARY SURGERY  1989    lithrotripsy    LAPAROSCOPIC CHOLECYSTECTOMY N/A 8/11/2020    Procedure: (2) LAPAROSCOPIC CHOLECYSTECTOMY;  Surgeon: Floyd Smith MD;  Location: [facility] OR    ORTHOPEDIC SURGERY  1989    laminectomy L 4-5    ORTHOPEDIC SURGERY  1996    disectomy     VASCULAR SURGERY      vein  stripping       Current Outpatient Medications   Medication Sig Dispense Refill    amLODIPine (NORVASC) 2.5 MG tablet TAKE 1 TABLET BY MOUTH EVERY DAY 90 tablet 0    aspirin 81 MG tablet Take 81 mg by mouth At Bedtime      baclofen (LIORESAL) 10 MG tablet Take 1 tablet (10 mg) by mouth daily 180 tablet 1    blood glucose (NO BRAND SPECIFIED) lancets standard Use to test blood sugar one time(s) daily or as directed. 100 each 1    blood glucose (NO BRAND SPECIFIED) test strip Use to test blood sugar once daily or as directed. 100 strip 1    carBAMazepine (TEGRETOL XR) 200 MG 12 hr tablet TAKE 2 -3 TABS BY MOUTH DAILY WITH BREAKFAST AND 1 TAB DAILY WITH LUNCH AND 2-3 TABS AT BEDTIME. 630 tablet 0    clobetasol (TEMOVATE) 0.05 % ointment Apply topically as needed       cloNIDine (CATAPRES) 0.1 MG tablet TAKE 1 TABLET BY MOUTH EVERY MORNING AND 2 TABLETS BY MOUTH AT BEDTIME 270 tablet 1    clotrimazole-betamethasone (LOTRISONE) 1-0.05 % external cream APPLY TO AFFECTED AREAS OF BOTH FEET AS NEEDED      Desonide Crea-Wound Dress Crea (DESONIL CREAM EX) Externally apply 0.05 oz topically as needed Taro       empagliflozin (JARDIANCE) 25 MG TABS tablet Take 1 tablet (25 mg) by mouth daily 90 tablet 0    fexofenadine (ALLEGRA) 180 MG tablet Take 180 mg by mouth daily      finasteride (PROSCAR) 5 MG tablet Take 1 tablet (5 mg) by mouth daily 90 tablet 3    furosemide (LASIX) 20 MG tablet TAKE 0.5 TABLETS (10 MG) BY MOUTH 2 TIMES DAILY 90 tablet 0    gabapentin (NEURONTIN) 300 MG capsule TAKE 1 CAPSULE BY MOUTH 4 TIMES DAILY. 360 capsule 1    JANUMET  MG tablet TAKE 1 TABLET BY MOUTH TWICE A DAY WITH FOOD 180 tablet 0    ketoconazole (NIZORAL) 2 % external cream APPLY TOPICALLY DAILY 30 g 3    lisinopril (ZESTRIL) 40 MG tablet Take 1 tablet (40 mg) by mouth daily 90 tablet 1    Magnesium 400 MG CAPS Take by mouth 2 times daily      medical cannabis (Patient's own supply) 1 each (1 Dose) At Bedtime Liquid THC  (The  purpose of this order is to document that the patient reports taking medical cannabis.  This is not a prescription, and is not used to certify that the patient has a qualifying medical condition.)  0    mupirocin (BACTROBAN) 2 % external ointment APPLY TO RASH IN GENITAL AREAS TWICE DAILY TO FOUR TIMES DAILY      niacin (SLO-NIACIN) 250 MG TBCR CR tablet Take 250 mg by mouth daily       omeprazole (PRILOSEC) 40 MG DR capsule Take 1 capsule (40 mg) by mouth 2 times daily 180 capsule 1    Polyethylene Glycol 3350 (MIRALAX PO) Take 1 capful by mouth Mixed in water once daily as needed.      prazosin (MINIPRESS) 1 MG capsule TAKE 1 CAPSULE BY MOUTH AT BEDTIME 90 capsule 1    PSYLLIUM HUSK PO Take 1,500 mg by mouth daily      senna-docusate (SENOKOT-S/PERICOLACE) 8.6-50 MG tablet Take 2 tablets by mouth 2 times daily as needed       sodium chloride 1 GM tablet Take 1 tablet (1 g) by mouth 3 times daily      valACYclovir (VALTREX) 1000 mg tablet TAKE TWO PILLS (2 GM) TWICE DAILY FOR 1 DAY AS NEEDED FOR FLARES      VITAMIN D, CHOLECALCIFEROL, PO Take 2,000 Units by mouth 2 times daily      vitamin E 400 UNITS TABS Take 1 tablet by mouth daily as needed       zinc sulfate (ZINCATE) 220 (50 Zn) MG capsule Take 220 mg by mouth daily      mometasone (ELOCON) 0.1 % external cream APPLY TO RASH BEHIND EARS TWICE DAILY       No current facility-administered medications for this visit.        Allergies: Amoxicillin-pot clavulanate, Atorvastatin, Carvedilol, Cefuroxime, Celebrex [celecoxib], Cephalexin, Clindamycin, Colestipol, Cozaar [losartan], Crestor [rosuvastatin], Diclofenac-misoprostol, Gemfibrozil, Glucotrol [glipizide], Ibuprofen, Nabumetone, Oxycodone-acetaminophen, Ozempic [semaglutide(0.25 or 0.5mg-dos)], Statins, Vioxx [rofecoxib], Zocor [simvastatin], Cefuroxime axetil, Erythromycin, Sulfabenzamide, and Zetia [ezetimibe]      Immunization History   Administered Date(s) Administered    Influenza (High Dose) 3 valent  vaccine 11/12/2014, 10/28/2015, 11/04/2016, 11/20/2017, 11/08/2018    Influenza (IIV3) PF 10/09/2009, 10/15/2010, 11/30/2011, 10/08/2012, 10/14/2013    Influenza Vaccine 18-64 (Flublok) 10/31/2019    Influenza Vaccine 65+ (Fluzone HD) 11/10/2020, 12/06/2021    Influenza Vaccine IM Ages 6-35 Months 4 Valent (PF) 10/16/2012    Influenza Vaccine, 6+MO IM (QUADRIVALENT W/PRESERVATIVES) 11/20/2017, 11/08/2018    Pneumo Conj 13-V (2010&after) 08/28/2001    Pneumococcal 23 valent 02/07/2001, 06/17/2009    TDAP (Adacel,Boostrix) 05/17/2022    TDAP Vaccine (Boostrix) 06/09/2008    Zoster recombinant adjuvanted (SHINGRIX) 05/17/2022    Zoster vaccine, live 03/17/2007      OBJECTIVE:BP (!) 144/72 (BP Location: Right arm, Patient Position: Chair, Cuff Size: Adult Large)   Pulse 68   Temp 97.1  F (36.2  C) (Temporal)   Resp 20   Wt (!) 156 kg (344 lb)   BMI 45.39 kg/m     He appears well, vital signs are as noted by the nurse. Ears normal.  Throat and pharynx normal.  Neck supple. No adenopathy in the neck. Nose is congested. Sinuses non tender. The chest is clear, without wheezes or rales.    ASSESSMENT:   Left facial pain- no clear sinus infection symptoms -no nasal discharge or congestion- suspect this is more a TN flare- possible due to decreased dose Tegretol    PLAN: go back up to previous tegretol dose, if nasal congestion and discharge develops let me know- may then need abx  Symptomatic therapy suggested: push fluids. Call or return to clinic prn if these symptoms worsen or fail to improve as anticipated.

## 2024-06-25 NOTE — NURSING NOTE
Jovi Aldana is here for sinus pain in left side of face. Feels constricted, not blocked. Is doing sinus mists and heat. Not sure if it is TN or sinus    Questioned patient about current smoking habits.  Pt. has never smoked.  PULSE regular  My Chart: active  CLASSIFICATION OF OVERWEIGHT AND OBESITY BY BMI                        Obesity Class           BMI(kg/m2)  Underweight                                    < 18.5  Normal                                         18.5-24.9  Overweight                                     25.0-29.9  OBESITY                     I                  30.0-34.9                             II                 35.0-39.9  EXTREME OBESITY             III                >40                            Patient's  BMI Body mass index is 45.39 kg/m .  http://hin.nhlbi.nih.gov/menuplanner/menu.cgi  Pre-visit planning  Immunizations - up to date  Colonoscopy -   Mammogram -   Asthma -   PHQ9 -    RHODA-7 -      The patient has verbalized that it is ok to leave a detailed voice message on the patient's cell phone with results/recommendations from this visit.

## 2024-07-11 DIAGNOSIS — E87.1 HYPOSMOLALITY SYNDROME: Primary | ICD-10-CM

## 2024-07-11 DIAGNOSIS — E22.2 SYNDROME OF INAPPROPRIATE ANTIDIURETIC HORMONE SECRETION (H): ICD-10-CM

## 2024-07-11 LAB
BUN SERPL-MCNC: 12 MG/DL (ref 7–25)
BUN/CREATININE RATIO: 14 (ref 6–32)
CALCIUM SERPL-MCNC: 8.9 MG/DL (ref 8.6–10.3)
CHLORIDE SERPLBLD-SCNC: 97.4 MMOL/L (ref 98–110)
CO2 SERPL-SCNC: 31.7 MMOL/L (ref 20–32)
CREAT SERPL-MCNC: 0.86 MG/DL (ref 0.6–1.3)
GLUCOSE SERPL-MCNC: 214 MG/DL (ref 60–99)
POTASSIUM SERPL-SCNC: 4.78 MMOL/L (ref 3.5–5.3)
SODIUM SERPL-SCNC: 133.2 MMOL/L (ref 135–146)

## 2024-07-11 PROCEDURE — 36415 COLL VENOUS BLD VENIPUNCTURE: CPT | Performed by: FAMILY MEDICINE

## 2024-07-11 PROCEDURE — 80048 BASIC METABOLIC PNL TOTAL CA: CPT | Performed by: FAMILY MEDICINE

## 2024-07-11 NOTE — NURSING NOTE
ab only non fasting      Order in file  [facility]  Dr. Luis A Calloway  Phone [phone]     Fax [phone]     Will fax when results are back

## 2024-07-30 DIAGNOSIS — E22.2 SYNDROME OF INAPPROPRIATE ANTIDIURETIC HORMONE SECRETION (H): Primary | ICD-10-CM

## 2024-07-30 DIAGNOSIS — E87.1 HYPOSMOLALITY SYNDROME: ICD-10-CM

## 2024-07-30 LAB
BUN SERPL-MCNC: 11 MG/DL (ref 7–25)
BUN/CREATININE RATIO: 13 (ref 6–32)
CALCIUM SERPL-MCNC: 8.7 MG/DL (ref 8.6–10.3)
CHLORIDE SERPLBLD-SCNC: 98 MMOL/L (ref 98–110)
CO2 SERPL-SCNC: 29.8 MMOL/L (ref 20–32)
CREAT SERPL-MCNC: 0.86 MG/DL (ref 0.6–1.3)
GLUCOSE SERPL-MCNC: 141 MG/DL (ref 60–99)
POTASSIUM SERPL-SCNC: 5.12 MMOL/L (ref 3.5–5.3)
SODIUM SERPL-SCNC: 137 MMOL/L (ref 135–146)

## 2024-07-30 PROCEDURE — 80048 BASIC METABOLIC PNL TOTAL CA: CPT | Performed by: FAMILY MEDICINE

## 2024-07-30 PROCEDURE — 36415 COLL VENOUS BLD VENIPUNCTURE: CPT | Performed by: FAMILY MEDICINE

## 2024-07-30 NOTE — NURSING NOTE
lab only non fasting      Order in file  [facility]  Dr. Luis A Calloway  Phone [phone]     Fax [phone]     Will fax when results are back

## 2024-08-03 NOTE — TELEPHONE ENCOUNTER
Last OV 11/4/2020, medication was not filled at that time.     Pending Prescriptions:                       Disp   Refills    amLODIPine (NORVASC) 2.5 MG tablet        90 tab*1            Sig: Take 1 tablet (2.5 mg) by mouth daily       Rebeka CLARK; Milly HERNANDEZ

## 2024-08-08 DIAGNOSIS — I10 ESSENTIAL HYPERTENSION: ICD-10-CM

## 2024-08-08 RX ORDER — AMLODIPINE BESYLATE 2.5 MG/1
2.5 TABLET ORAL DAILY
Qty: 90 TABLET | Refills: 0 | Status: SHIPPED | OUTPATIENT
Start: 2024-08-08

## 2024-08-08 NOTE — TELEPHONE ENCOUNTER
Jovi Aldana is requesting a refill of:    Pending Prescriptions:                       Disp   Refills    amLODIPine (NORVASC) 2.5 MG tablet [Pharm*90 tab*0            Sig: TAKE 1 TABLET BY MOUTH EVERY DAY    Please close encounter if RX was sent. Thanks, Stephania

## 2024-08-12 DIAGNOSIS — E11.8 TYPE 2 DIABETES MELLITUS WITH COMPLICATION, WITHOUT LONG-TERM CURRENT USE OF INSULIN (H): ICD-10-CM

## 2024-08-13 RX ORDER — SITAGLIPTIN AND METFORMIN HYDROCHLORIDE 1000; 50 MG/1; MG/1
TABLET, FILM COATED ORAL
COMMUNITY
Start: 2024-08-13

## 2024-08-16 DIAGNOSIS — G50.0 TRIGEMINAL NEURALGIA: ICD-10-CM

## 2024-08-16 RX ORDER — CARBAMAZEPINE 200 MG/1
TABLET, EXTENDED RELEASE ORAL
Qty: 630 TABLET | Refills: 0 | Status: SHIPPED | OUTPATIENT
Start: 2024-08-16 | End: 2024-08-26

## 2024-08-16 NOTE — TELEPHONE ENCOUNTER
Jovi Aldana is requesting a refill of:    Pending Prescriptions:                       Disp   Refills    carBAMazepine (TEGRETOL XR) 200 MG 12 hr *630 ta*0            Sig: TAKE 2-3 TABLETS BY MOUTH DAILY WITH BREAKFAST           AND 1 TABLET DAILY WITH LUNCH AND 2-3 TABLETS AT           BEDTIME.

## 2024-08-21 RX ORDER — SODIUM CHLORIDE 1 G/1
TABLET ORAL
Qty: 270 TABLET | Refills: 2 | OUTPATIENT
Start: 2024-08-21

## 2024-08-26 ENCOUNTER — OFFICE VISIT (OUTPATIENT)
Dept: FAMILY MEDICINE | Facility: CLINIC | Age: 79
End: 2024-08-26

## 2024-08-26 VITALS
HEART RATE: 64 BPM | HEIGHT: 73 IN | DIASTOLIC BLOOD PRESSURE: 66 MMHG | BODY MASS INDEX: 41.75 KG/M2 | RESPIRATION RATE: 20 BRPM | TEMPERATURE: 97.1 F | SYSTOLIC BLOOD PRESSURE: 154 MMHG | WEIGHT: 315 LBS

## 2024-08-26 DIAGNOSIS — U07.1 INFECTION DUE TO 2019 NOVEL CORONAVIRUS: ICD-10-CM

## 2024-08-26 DIAGNOSIS — E22.2 SYNDROME OF INAPPROPRIATE ANTIDIURETIC HORMONE SECRETION (H): ICD-10-CM

## 2024-08-26 DIAGNOSIS — G50.0 TRIGEMINAL NEURALGIA: ICD-10-CM

## 2024-08-26 DIAGNOSIS — G62.9 PERIPHERAL POLYNEUROPATHY: ICD-10-CM

## 2024-08-26 DIAGNOSIS — K21.00 GASTROESOPHAGEAL REFLUX DISEASE WITH ESOPHAGITIS WITHOUT HEMORRHAGE: ICD-10-CM

## 2024-08-26 DIAGNOSIS — I89.0 ACQUIRED LYMPHEDEMA OF LOWER EXTREMITY: ICD-10-CM

## 2024-08-26 DIAGNOSIS — I63.22 CEREBROVASCULAR ACCIDENT (CVA) DUE TO OCCLUSION OF BASILAR ARTERY (H): ICD-10-CM

## 2024-08-26 DIAGNOSIS — I10 ESSENTIAL HYPERTENSION: ICD-10-CM

## 2024-08-26 DIAGNOSIS — E78.2 MIXED HYPERLIPIDEMIA: ICD-10-CM

## 2024-08-26 DIAGNOSIS — N40.1 BENIGN PROSTATIC HYPERPLASIA WITH LOWER URINARY TRACT SYMPTOMS, SYMPTOM DETAILS UNSPECIFIED: ICD-10-CM

## 2024-08-26 DIAGNOSIS — E11.8 TYPE 2 DIABETES MELLITUS WITH COMPLICATION, WITHOUT LONG-TERM CURRENT USE OF INSULIN (H): Primary | ICD-10-CM

## 2024-08-26 LAB
ALBUMIN (URINE) MG/L: 80
ALBUMIN SERPL-MCNC: 3.9 G/DL (ref 3.6–5.1)
ALBUMIN URINE MG/G CR: ABNORMAL MG/G CREATININE
ALP SERPL-CCNC: 53 U/L (ref 33–130)
ALT 1742-6: 12 U/L (ref 0–32)
AST 1920-8: 11 U/L (ref 0–35)
BILIRUB SERPL-MCNC: 0.6 MG/DL (ref 0.2–1.2)
BUN SERPL-MCNC: 14 MG/DL (ref 7–25)
BUN/CREATININE RATIO: 19 (ref 6–32)
CALCIUM SERPL-MCNC: 9.4 MG/DL (ref 8.6–10.3)
CHLORIDE SERPLBLD-SCNC: 99.5 MMOL/L (ref 98–110)
CHOLEST SERPL-MCNC: 266 MG/DL (ref 0–199)
CHOLEST/HDLC SERPL: 3 {RATIO} (ref 0–5)
CO2 SERPL-SCNC: 30.7 MMOL/L (ref 20–32)
CREAT SERPL-MCNC: 0.75 MG/DL (ref 0.6–1.3)
CREATININE URINE MG/DL: 50 MG/DL
GLUCOSE SERPL-MCNC: 134 MG/DL (ref 60–99)
HDLC SERPL-MCNC: 81 MG/DL (ref 40–150)
HEMOGLOBIN A1C: 7.1 % (ref 4–5.6)
LDLC SERPL CALC-MCNC: 159 MG/DL
POTASSIUM SERPL-SCNC: 4.76 MMOL/L (ref 3.5–5.3)
PROT SERPL-MCNC: 6.4 G/DL (ref 6.1–8.1)
SODIUM SERPL-SCNC: 137.3 MMOL/L (ref 135–146)
TRIGL SERPL-MCNC: 132 MG/DL (ref 0–149)

## 2024-08-26 PROCEDURE — 83036 HEMOGLOBIN GLYCOSYLATED A1C: CPT | Performed by: FAMILY MEDICINE

## 2024-08-26 PROCEDURE — G2211 COMPLEX E/M VISIT ADD ON: HCPCS | Performed by: FAMILY MEDICINE

## 2024-08-26 PROCEDURE — 80061 LIPID PANEL: CPT | Performed by: FAMILY MEDICINE

## 2024-08-26 PROCEDURE — 36415 COLL VENOUS BLD VENIPUNCTURE: CPT | Performed by: FAMILY MEDICINE

## 2024-08-26 PROCEDURE — 82043 UR ALBUMIN QUANTITATIVE: CPT | Performed by: FAMILY MEDICINE

## 2024-08-26 PROCEDURE — 80053 COMPREHEN METABOLIC PANEL: CPT | Performed by: FAMILY MEDICINE

## 2024-08-26 PROCEDURE — 99214 OFFICE O/P EST MOD 30 MIN: CPT | Performed by: FAMILY MEDICINE

## 2024-08-26 PROCEDURE — 82570 ASSAY OF URINE CREATININE: CPT | Performed by: FAMILY MEDICINE

## 2024-08-26 RX ORDER — BACLOFEN 10 MG/1
10 TABLET ORAL DAILY
Qty: 180 TABLET | Refills: 1 | Status: SHIPPED | OUTPATIENT
Start: 2024-08-26

## 2024-08-26 RX ORDER — CLONIDINE HYDROCHLORIDE 0.1 MG/1
TABLET ORAL
Qty: 270 TABLET | Refills: 1 | Status: SHIPPED | OUTPATIENT
Start: 2024-08-26

## 2024-08-26 RX ORDER — DAPAGLIFLOZIN 10 MG/1
10 TABLET, FILM COATED ORAL DAILY
Qty: 90 TABLET | Refills: 1 | Status: SHIPPED | OUTPATIENT
Start: 2024-08-26

## 2024-08-26 RX ORDER — OMEPRAZOLE 40 MG/1
40 CAPSULE, DELAYED RELEASE ORAL 2 TIMES DAILY
Qty: 180 CAPSULE | Refills: 1 | Status: SHIPPED | OUTPATIENT
Start: 2024-08-26

## 2024-08-26 RX ORDER — SITAGLIPTIN AND METFORMIN HYDROCHLORIDE 1000; 50 MG/1; MG/1
1 TABLET, FILM COATED ORAL 2 TIMES DAILY WITH MEALS
Qty: 180 TABLET | Refills: 1 | Status: SHIPPED | OUTPATIENT
Start: 2024-08-26

## 2024-08-26 RX ORDER — PRAZOSIN HYDROCHLORIDE 1 MG/1
1 CAPSULE ORAL AT BEDTIME
Qty: 90 CAPSULE | Refills: 1 | Status: SHIPPED | OUTPATIENT
Start: 2024-08-26 | End: 2024-09-13

## 2024-08-26 RX ORDER — CARBAMAZEPINE 200 MG/1
TABLET, EXTENDED RELEASE ORAL
Qty: 630 TABLET | Refills: 1 | Status: SHIPPED | OUTPATIENT
Start: 2024-08-26

## 2024-08-26 RX ORDER — LISINOPRIL 40 MG/1
40 TABLET ORAL DAILY
Qty: 90 TABLET | Refills: 1 | Status: SHIPPED | OUTPATIENT
Start: 2024-08-26

## 2024-08-26 NOTE — PROGRESS NOTES
"  Assessment & Plan     Type 2 diabetes mellitus with complication, without long-term current use of insulin (H)  Control in reasonable range, continue current medications at current doses , face to face encounter today confirms evidence of peripheral neuropathy placing pt at risk for ulcer-recommend diabetic shoes  - ALBUMIN RANDOM URINE QUANTITATIVE (BFP)  - HEMOGLOBIN A1C (BFP)    Mixed hyperlipidemia  Control uncertain    Essential hypertension  Overall well controlled based on [facility] HOME READINGS, Check blood pressure readings outside of the clinic several times per week, write down values, and follow up if elevated within the next several weeks. Blood pressure can be checked at the firestation, drugstore,  or any valid site. 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)  Recheck, followed along with nephrology    Benign prostatic hyperplasia with lower urinary tract symptoms, symptom details unspecified  controlled    Cerebrovascular accident (CVA) due to occlusion of basilar artery (H)  continue current medications at current doses     Peripheral polyneuropathy  As noted, evidence of neuropathy placing pt at risk for ulcer, recommend diabetic shoes    Gastroesophageal reflux disease with esophagitis without hemorrhage  Well controlled, continue current medications at current doses     Acquired lymphedema of lower extremity  Stable, continue current medications at current doses     I              BMI  Estimated body mass index is 45.39 kg/m  as calculated from the following:    Height as of this encounter: 1.854 m (6' 1\").    Weight as of this encounter: 156 kg (344 lb).   Weight management plan: Discussed healthy diet and exercise guidelines      FUTURE APPOINTMENTS:       - Follow-up visit in 6 mo  Work on weight loss  Regular exercise    No follow-ups on " file.    Subjective   Peterson is a 79 year old, presenting for the following health issues:  Recheck Medication and Orders (Diabetic shoes, orders are coming)    HPI       Diabetes Follow-up    How often are you checking your blood sugar? A few times a month  What time of day are you checking your blood sugars (select all that apply)?  Before meals  Have you had any blood sugars above 200?  No  Have you had any blood sugars below 70?  No  What symptoms do you notice when your blood sugar is low?  Shaky  What concerns do you have today about your diabetes? None   Do you have any of these symptoms? (Select all that apply)  Numbness in feet          Hyperlipidemia Follow-Up    Are you regularly taking any medication or supplement to lower your cholesterol?   No  Are you having muscle aches or other side effects that you think could be caused by your cholesterol lowering medication?  No    Hypertension Follow-up    Do you check your blood pressure regularly outside of the clinic? Yes   Are you following a low salt diet? No  Are your blood pressures ever more than 140 on the top number (systolic) OR more   than 90 on the bottom number (diastolic), for example 140/90? Rarely elevated at home    BP Readings from Last 2 Encounters:   08/26/24 (!) 154/66   06/25/24 (!) 144/72     Hemoglobin A1C (%)   Date Value   03/05/2024 6.9 (A)   08/01/2023 6.0   02/14/2023 7.1 (A)     LDL Cholesterol Calculated (mg/dL)   Date Value   08/09/2017 178 (H)   01/30/2017 184 (H)     LDL Cholesterol Direct (mg/dL)   Date Value   06/16/2022 163 (A)   08/23/2021 143 (A)         SIADH- following with nephrology    Gerd- using PPI daily, No fevers, melena, hematemesis, or weight loss.     Edema- stable    Neuropathy- states no change-still burning and sharp pains at times  How many servings of fruits and vegetables do you eat daily?  2-3  On average, how many sweetened beverages do you drink each day (Examples: soda, juice, sweet tea, etc.  Do NOT  "count diet or artificially sweetened beverages)?   1  How many days per week do you exercise enough to make your heart beat faster? 3 or less  How many minutes a day do you exercise enough to make your heart beat faster? 9 or less  How many days per week do you miss taking your medication? 0        Review of Systems  Constitutional, HEENT, cardiovascular, pulmonary, gi and gu systems are negative, except as otherwise noted.      Objective    BP (!) 154/66 (BP Location: Right arm, Patient Position: Chair, Cuff Size: Adult Large)   Pulse 64   Temp 97.1  F (36.2  C) (Temporal)   Resp 20   Ht 1.854 m (6' 1\")   Wt (!) 156 kg (344 lb)   BMI 45.39 kg/m    Body mass index is 45.39 kg/m .  Physical Exam   GENERAL: alert and no distress  EYES: Eyes grossly normal to inspection, PERRL and conjunctivae and sclerae normal  NECK: no adenopathy, no asymmetry, masses, or scars  RESP: lungs clear to auscultation - no rales, rhonchi or wheezes  CV: regular rate and rhythm, normal S1 S2, no S3 or S4, no murmur, click or rub, no peripheral edema  ABDOMEN: soft, nontender, no hepatosplenomegaly, no masses and bowel sounds normal  MS: no gross musculoskeletal defects noted, no edema  Diabetic foot exam: normal DP and PT pulses, no trophic changes or ulcerative lesions, and reduced sensation at toes    Results for orders placed or performed in visit on 08/26/24 (from the past 24 hour(s))   HEMOGLOBIN A1C (BFP)   Result Value Ref Range    Hemoglobin A1C 7.1 (A) 4 - 5.6 %   ALBUMIN RANDOM URINE QUANTITATIVE (BFP)   Result Value Ref Range    Albumin mg/L 80 (A) 30    Creatinine Urine mg/dL 50 300 mg/dL    Albumin Urine mg/g Cr  (A) 0 MG/G Creatinine           Signed Electronically by: Josafat Marroquin MD      "

## 2024-08-26 NOTE — NURSING NOTE
Jovi Aldana is here for a diabetic check and medication refill.    Questioned patient about current smoking habits.  Pt. has never smoked.  Body mass index is 45.39 kg/m .  PULSE regular  My Chart: [facility]  CLASSIFICATION OF OVERWEIGHT AND OBESITY BY BMI                        Obesity Class           BMI(kg/m2)  Underweight                                    < 18.5  Normal                                         18.5-24.9  Overweight                                     25.0-29.9  OBESITY                     I                  30.0-34.9                             II                 35.0-39.9  EXTREME OBESITY             III                >40                            Patient's  BMI Body mass index is 45.39 kg/m .  http://hin.nhlbi.nih.gov/menuplanner/menu.cgi    Pre-visit planning  Immunizations - up to date  Colonoscopy -   Mammogram -   Asthma -   PHQ9 -    RHODA-7 -      The patient has verbalized that it is ok to leave a detailed voice message on the patient's cell phone with results/recommendations from this visit.

## 2024-08-27 ENCOUNTER — TELEPHONE (OUTPATIENT)
Dept: FAMILY MEDICINE | Facility: CLINIC | Age: 79
End: 2024-08-27

## 2024-08-29 DIAGNOSIS — E11.8 TYPE 2 DIABETES MELLITUS WITH COMPLICATION, WITHOUT LONG-TERM CURRENT USE OF INSULIN (H): ICD-10-CM

## 2024-08-29 DIAGNOSIS — R60.9 EDEMA, UNSPECIFIED TYPE: ICD-10-CM

## 2024-08-29 RX ORDER — GABAPENTIN 300 MG/1
300 CAPSULE ORAL 4 TIMES DAILY
Qty: 360 CAPSULE | Refills: 1 | Status: SHIPPED | OUTPATIENT
Start: 2024-08-29

## 2024-08-29 RX ORDER — FUROSEMIDE 20 MG
10 TABLET ORAL 2 TIMES DAILY
Qty: 90 TABLET | Refills: 1 | Status: SHIPPED | OUTPATIENT
Start: 2024-08-29

## 2024-08-29 NOTE — TELEPHONE ENCOUNTER
Pt last seen for med recheck on 08/26/24.    Jovi Aldana is requesting a refill of:    Pending Prescriptions:                       Disp   Refills    gabapentin (NEURONTIN) 300 MG capsule [Ph*360 ca*1            Sig: TAKE 1 CAPSULE BY MOUTH FOUR TIMES A DAY

## 2024-08-29 NOTE — TELEPHONE ENCOUNTER
Pt last seen 08/26/24.    Jovi Aldana is requesting a refill of:    Pending Prescriptions:                       Disp   Refills    furosemide (LASIX) 20 MG tablet [Pharmacy*90 tab*1            Sig: TAKE 0.5 TABLETS (10 MG) BY MOUTH 2 TIMES DAILY

## 2024-09-13 DIAGNOSIS — U07.1 INFECTION DUE TO 2019 NOVEL CORONAVIRUS: ICD-10-CM

## 2024-09-13 RX ORDER — PRAZOSIN HYDROCHLORIDE 1 MG/1
1 CAPSULE ORAL 2 TIMES DAILY
Qty: 180 CAPSULE | Refills: 1 | Status: SHIPPED | OUTPATIENT
Start: 2024-09-13

## 2024-09-13 NOTE — TELEPHONE ENCOUNTER
[facility] has documented prazosin 1mg BID. Patient has been taking this way and systolic pressures have decreased to the 130s on home readings.    Will send in new order reflecting BID dosing.    Martha Collier, PharmD  Clinical Pharmacist  [facility]  General Clinic Phone: [phone]  Direct Office Phone: [phone]

## 2024-10-01 DIAGNOSIS — E22.2 SIADH (SYNDROME OF INAPPROPRIATE ADH PRODUCTION) (H): ICD-10-CM

## 2024-10-01 DIAGNOSIS — E87.1 HYPOSMOLALITY SYNDROME: Primary | ICD-10-CM

## 2024-10-01 LAB
BUN SERPL-MCNC: 14 MG/DL (ref 7–25)
BUN/CREATININE RATIO: 16 (ref 6–32)
CALCIUM SERPL-MCNC: 9 MG/DL (ref 8.6–10.3)
CHLORIDE SERPLBLD-SCNC: 98.1 MMOL/L (ref 98–110)
CO2 SERPL-SCNC: 30.1 MMOL/L (ref 20–32)
CREAT SERPL-MCNC: 0.9 MG/DL (ref 0.6–1.3)
GLUCOSE SERPL-MCNC: 178 MG/DL (ref 60–99)
POTASSIUM SERPL-SCNC: 5.22 MMOL/L (ref 3.5–5.3)
SODIUM SERPL-SCNC: 136.7 MMOL/L (ref 135–146)

## 2024-10-01 PROCEDURE — 36415 COLL VENOUS BLD VENIPUNCTURE: CPT | Performed by: FAMILY MEDICINE

## 2024-10-01 PROCEDURE — 80048 BASIC METABOLIC PNL TOTAL CA: CPT | Performed by: FAMILY MEDICINE

## 2024-10-01 NOTE — NURSING NOTE
Nursing Note  Alpa Cool MA (Medical Assistant)  lab only non fasting      Order in file  [facility]  Dr. Luis A Calloway  Phone [phone]     Fax [phone]     Will fax when results are back

## 2024-10-28 DIAGNOSIS — E87.1 HYPOSMOLALITY SYNDROME: Primary | ICD-10-CM

## 2024-10-28 DIAGNOSIS — E22.2 SIADH (SYNDROME OF INAPPROPRIATE ADH PRODUCTION) (H): ICD-10-CM

## 2024-10-28 LAB
BUN SERPL-MCNC: 10 MG/DL (ref 7–25)
BUN/CREATININE RATIO: 12 (ref 6–32)
CALCIUM SERPL-MCNC: 9 MG/DL (ref 8.6–10.3)
CHLORIDE SERPLBLD-SCNC: 98.4 MMOL/L (ref 98–110)
CO2 SERPL-SCNC: 32.5 MMOL/L (ref 20–32)
CREAT SERPL-MCNC: 0.86 MG/DL (ref 0.6–1.3)
GLUCOSE SERPL-MCNC: 128 MG/DL (ref 60–99)
POTASSIUM SERPL-SCNC: 5.21 MMOL/L (ref 3.5–5.3)
SODIUM SERPL-SCNC: 138.8 MMOL/L (ref 135–146)

## 2024-10-28 PROCEDURE — 80048 BASIC METABOLIC PNL TOTAL CA: CPT | Performed by: FAMILY MEDICINE

## 2024-10-28 PROCEDURE — 36415 COLL VENOUS BLD VENIPUNCTURE: CPT | Performed by: FAMILY MEDICINE

## 2024-10-28 NOTE — NURSING NOTE
Nursing Note  Alpa Cool MA (Medical Assistant)  lab only non fasting      Order in file  [facility]  Dr. Luis A Calloway  Phone [phone]     Fax [phone]     Will fax when results are back               Pt has a new provider   Dr. Sumit Grissom   Pt wife was told to get a new order with her signature on it  Pt has an appt with her in two weeks

## 2024-11-09 DIAGNOSIS — I10 ESSENTIAL HYPERTENSION: ICD-10-CM

## 2024-11-11 RX ORDER — AMLODIPINE BESYLATE 2.5 MG/1
2.5 TABLET ORAL DAILY
Qty: 90 TABLET | Refills: 0 | Status: SHIPPED | OUTPATIENT
Start: 2024-11-11

## 2024-11-21 ENCOUNTER — TELEPHONE (OUTPATIENT)
Dept: FAMILY MEDICINE | Facility: CLINIC | Age: 79
End: 2024-11-21

## 2024-11-21 DIAGNOSIS — I89.0 ACQUIRED LYMPHEDEMA OF LOWER EXTREMITY: Primary | ICD-10-CM

## 2024-12-10 DIAGNOSIS — E22.2 SIADH (SYNDROME OF INAPPROPRIATE ADH PRODUCTION) (H): ICD-10-CM

## 2024-12-10 DIAGNOSIS — E87.1 HYPOSMOLALITY SYNDROME: Primary | ICD-10-CM

## 2024-12-10 LAB
BUN SERPL-MCNC: 14 MG/DL (ref 7–25)
BUN/CREATININE RATIO: 18 (ref 6–32)
CALCIUM SERPL-MCNC: 9.2 MG/DL (ref 8.6–10.3)
CHLORIDE SERPLBLD-SCNC: 99.3 MMOL/L (ref 98–110)
CO2 SERPL-SCNC: 32.5 MMOL/L (ref 20–32)
CREAT SERPL-MCNC: 0.78 MG/DL (ref 0.6–1.3)
GLUCOSE SERPL-MCNC: 173 MG/DL (ref 60–99)
POTASSIUM SERPL-SCNC: 5.11 MMOL/L (ref 3.5–5.3)
SODIUM SERPL-SCNC: 137.5 MMOL/L (ref 135–146)

## 2024-12-10 PROCEDURE — 36415 COLL VENOUS BLD VENIPUNCTURE: CPT | Performed by: FAMILY MEDICINE

## 2024-12-10 PROCEDURE — 80048 BASIC METABOLIC PNL TOTAL CA: CPT | Performed by: FAMILY MEDICINE

## 2024-12-10 NOTE — NURSING NOTE
Nursing Note  Alpa Cool MA (Medical Assistant)  lab only non fasting      Order in file  [facility]  Dr. Luis A Calloway  Phone [phone]     Fax [phone]     Will fax when results are back               Pt has a new provider     Dr. Sumit Grissom     Pt wife was told to get a new order with her signature on it 12-5-2024  Pt has an appt with her in two weeks        No new order sent wife states she called [facility] to send one  Alpa

## 2024-12-31 DIAGNOSIS — E87.1 HYPONATREMIA: Primary | ICD-10-CM

## 2024-12-31 LAB
BUN SERPL-MCNC: 12 MG/DL (ref 7–25)
BUN/CREATININE RATIO: 15 (ref 6–32)
CALCIUM SERPL-MCNC: 9.5 MG/DL (ref 8.6–10.3)
CHLORIDE SERPLBLD-SCNC: 97.4 MMOL/L (ref 98–110)
CO2 SERPL-SCNC: 31.5 MMOL/L (ref 20–32)
CREAT SERPL-MCNC: 0.81 MG/DL (ref 0.6–1.3)
GLUCOSE SERPL-MCNC: 131 MG/DL (ref 60–99)
POTASSIUM SERPL-SCNC: 4.95 MMOL/L (ref 3.5–5.3)
SODIUM SERPL-SCNC: 136.4 MMOL/L (ref 135–146)

## 2024-12-31 PROCEDURE — 36415 COLL VENOUS BLD VENIPUNCTURE: CPT | Performed by: FAMILY MEDICINE

## 2024-12-31 PROCEDURE — 80048 BASIC METABOLIC PNL TOTAL CA: CPT | Performed by: FAMILY MEDICINE

## 2024-12-31 NOTE — NURSING NOTE
lab only non fasting      Order in file  [facility]  Dr. Kali Grissom   Phone [phone]     Fax [phone]     Will fax when results are back

## 2025-01-28 DIAGNOSIS — E87.1 HYPONATREMIA: Primary | ICD-10-CM

## 2025-01-28 LAB
BUN SERPL-MCNC: 13 MG/DL (ref 7–25)
BUN/CREATININE RATIO: 15 (ref 6–32)
CALCIUM SERPL-MCNC: 9.4 MG/DL (ref 8.6–10.3)
CHLORIDE SERPLBLD-SCNC: 95.9 MMOL/L (ref 98–110)
CO2 SERPL-SCNC: 30.7 MMOL/L (ref 20–32)
CREAT SERPL-MCNC: 0.87 MG/DL (ref 0.6–1.3)
GLUCOSE SERPL-MCNC: 138 MG/DL (ref 60–99)
POTASSIUM SERPL-SCNC: 5.07 MMOL/L (ref 3.5–5.3)
SODIUM SERPL-SCNC: 135.7 MMOL/L (ref 135–146)

## 2025-01-28 PROCEDURE — 80048 BASIC METABOLIC PNL TOTAL CA: CPT | Performed by: FAMILY MEDICINE

## 2025-01-28 PROCEDURE — 36415 COLL VENOUS BLD VENIPUNCTURE: CPT | Performed by: FAMILY MEDICINE

## 2025-01-28 NOTE — NURSING NOTE
Nursing Note  Alpa Cool MA (Medical Assistant)  lab only non fasting      Order in file  [facility]  Tamiko Cooney   Phone [phone]     Fax [phone]     Will fax when results are back

## 2025-02-24 ENCOUNTER — OFFICE VISIT (OUTPATIENT)
Dept: FAMILY MEDICINE | Facility: CLINIC | Age: 80
End: 2025-02-24

## 2025-02-24 VITALS
DIASTOLIC BLOOD PRESSURE: 60 MMHG | WEIGHT: 315 LBS | TEMPERATURE: 97.3 F | HEART RATE: 72 BPM | SYSTOLIC BLOOD PRESSURE: 154 MMHG | BODY MASS INDEX: 44.86 KG/M2 | OXYGEN SATURATION: 93 %

## 2025-02-24 DIAGNOSIS — E22.2: ICD-10-CM

## 2025-02-24 DIAGNOSIS — I63.22 CEREBROVASCULAR ACCIDENT (CVA) DUE TO OCCLUSION OF BASILAR ARTERY (H): ICD-10-CM

## 2025-02-24 DIAGNOSIS — N40.1 BENIGN PROSTATIC HYPERPLASIA WITH LOWER URINARY TRACT SYMPTOMS, SYMPTOM DETAILS UNSPECIFIED: ICD-10-CM

## 2025-02-24 DIAGNOSIS — E11.8 TYPE 2 DIABETES MELLITUS WITH COMPLICATION, WITHOUT LONG-TERM CURRENT USE OF INSULIN (H): Primary | ICD-10-CM

## 2025-02-24 DIAGNOSIS — G50.0 TRIGEMINAL NEURALGIA: ICD-10-CM

## 2025-02-24 DIAGNOSIS — I89.0 ACQUIRED LYMPHEDEMA OF LOWER EXTREMITY: ICD-10-CM

## 2025-02-24 DIAGNOSIS — I10 ESSENTIAL HYPERTENSION: ICD-10-CM

## 2025-02-24 DIAGNOSIS — K21.00 GASTROESOPHAGEAL REFLUX DISEASE WITH ESOPHAGITIS WITHOUT HEMORRHAGE: ICD-10-CM

## 2025-02-24 DIAGNOSIS — E78.2 MIXED HYPERLIPIDEMIA: ICD-10-CM

## 2025-02-24 DIAGNOSIS — G62.9 PERIPHERAL POLYNEUROPATHY: ICD-10-CM

## 2025-02-24 LAB
ALBUMIN SERPL-MCNC: 3.5 G/DL (ref 3.6–5.1)
ALP SERPL-CCNC: 49 U/L (ref 33–130)
ALT 1742-6: 14 U/L (ref 0–32)
AST 1920-8: 11 U/L (ref 0–35)
BILIRUB SERPL-MCNC: 0.5 MG/DL (ref 0.2–1.2)
BUN SERPL-MCNC: 12 MG/DL (ref 7–25)
BUN/CREATININE RATIO: 14 (ref 6–32)
CALCIUM SERPL-MCNC: 8.5 MG/DL (ref 8.6–10.3)
CHLORIDE SERPLBLD-SCNC: 103.8 MMOL/L (ref 98–110)
CHOLEST SERPL-MCNC: 247 MG/DL (ref 0–199)
CHOLEST/HDLC SERPL: 3 {RATIO} (ref 0–5)
CO2 SERPL-SCNC: 26.8 MMOL/L (ref 20–32)
CREAT SERPL-MCNC: 0.84 MG/DL (ref 0.6–1.3)
GLUCOSE SERPL-MCNC: 138 MG/DL (ref 60–99)
HDLC SERPL-MCNC: 74 MG/DL (ref 40–150)
HEMOGLOBIN A1C: 7.2 % (ref 4–5.6)
LDLC SERPL CALC-MCNC: 140 MG/DL (ref 0–129)
POTASSIUM SERPL-SCNC: 4.65 MMOL/L (ref 3.5–5.3)
PROT SERPL-MCNC: 6.5 G/DL (ref 6.1–8.1)
SODIUM SERPL-SCNC: 135.5 MMOL/L (ref 135–146)
TRIGL SERPL-MCNC: 163 MG/DL (ref 0–149)

## 2025-02-24 PROCEDURE — 99214 OFFICE O/P EST MOD 30 MIN: CPT | Performed by: FAMILY MEDICINE

## 2025-02-24 PROCEDURE — 83036 HEMOGLOBIN GLYCOSYLATED A1C: CPT | Performed by: FAMILY MEDICINE

## 2025-02-24 PROCEDURE — 80061 LIPID PANEL: CPT | Performed by: FAMILY MEDICINE

## 2025-02-24 PROCEDURE — 80053 COMPREHEN METABOLIC PANEL: CPT | Performed by: FAMILY MEDICINE

## 2025-02-24 PROCEDURE — 36415 COLL VENOUS BLD VENIPUNCTURE: CPT | Performed by: FAMILY MEDICINE

## 2025-02-24 PROCEDURE — G2211 COMPLEX E/M VISIT ADD ON: HCPCS | Performed by: FAMILY MEDICINE

## 2025-02-24 RX ORDER — DAPAGLIFLOZIN 10 MG/1
10 TABLET, FILM COATED ORAL DAILY
Qty: 90 TABLET | Refills: 1 | Status: SHIPPED | OUTPATIENT
Start: 2025-02-24

## 2025-02-24 NOTE — NURSING NOTE
Chief Complaint   Patient presents with    Recheck Medication     Refills, fasting     Pre-visit Screening:  Immunizations:  up to date  Colonoscopy:  is up to date  Mammogram: na  Asthma Action Test/Plan:  na  PHQ9:  na  GAD7:  na  Questioned patient about current smoking habits Pt. has never smoked.  Ok to leave detailed message on voice mail for today's visit only yes, phone # [phone] (home)

## 2025-02-24 NOTE — PROGRESS NOTES
"  Assessment & Plan     Type 2 diabetes mellitus with complication, without long-term current use of insulin (H)  Well controlled overall, continue current medications at current doses   - dapagliflozin (FARXIGA) 10 MG TABS tablet  Dispense: 90 tablet; Refill: 1  - HEMOGLOBIN A1C (BFP)  - VENOUS COLLECTION    Mixed hyperlipidemia  Control uncertain, continue current medications at current doses pending labs  - Lipid Panel (BFP)  - Comprehensive Metobolic Panel (BFP)    Essential hypertension  Well controlled based on home readings, continue current medications at current doses   - Comprehensive Metobolic Panel (BFP)    Trigeminal neuralgia  Some flaring of late, continue current medications at current doses     Syndrome of inappropriate antidiuretic hormone secretion  Recheck, on fluid restriction    Benign prostatic hyperplasia with lower urinary tract symptoms, symptom details unspecified  Stable, continue current medications at current doses     Peripheral polyneuropathy  Stable control, continue current medications at current doses     Cerebrovascular accident (CVA) due to occlusion of basilar artery (H)  No new issues, continue current medications at current doses     Acquired lymphedema of lower extremity  Jobst stocking working well    Gastroesophageal reflux disease with esophagitis without hemorrhage  Well controlled, continue current medications at current doses             BMI  Estimated body mass index is 44.86 kg/m  as calculated from the following:    Height as of 8/26/24: 1.854 m (6' 1\").    Weight as of this encounter: 154.2 kg (340 lb).   Weight management plan: Discussed healthy diet and exercise guidelines      FUTURE APPOINTMENTS:       - Follow-up visit in 6 mo  Work on weight loss  Regular exercise    No follow-ups on file.    Jose Alfredo Winkler is a 79 year old, presenting for the following health issues:  Recheck Medication (Refills, fasting)    HPI       Diabetes Follow-up-Janumet, " farxiga    How often are you checking your blood sugar? One time daily 108 average am  What time of day are you checking your blood sugars (select all that apply)?  Before meals  Have you had any blood sugars above 200?  No  Have you had any blood sugars below 70?  No  What symptoms do you notice when your blood sugar is low?  Shaky  What concerns do you have today about your diabetes? None   Do you have any of these symptoms? (Select all that apply)  Numbness in feet  Have you had a diabetic eye exam in the last 12 months? yes            Hyperlipidemia Follow-Up    Are you regularly taking any medication or supplement to lower your cholesterol?   niacin  Are you having muscle aches or other side effects that you think could be caused by your cholesterol lowering medication?  No    Hypertension Follow-up    Do you check your blood pressure regularly outside of the clinic? Yes   Are you following a low salt diet? No  Are your blood pressures ever more than 140 on the top number (systolic) OR more   than 90 on the bottom number (diastolic), for example 140/90? No    BP Readings from Last 2 Encounters:   02/24/25 (!) 154/60   08/26/24 (!) 154/66     Hemoglobin A1C (%)   Date Value   08/26/2024 7.1 (A)   03/05/2024 6.9 (A)   08/01/2023 6.0   02/14/2023 7.1 (A)     LDL Cholesterol Calculated (mg/dL)   Date Value   08/09/2017 178 (H)   01/30/2017 184 (H)     LDL Cholesterol Direct (mg/dL)   Date Value   06/16/2022 163 (A)   08/23/2021 143 (A)         Cerebrovascular Follow-up    Patient history: ischemic cerebrovascular incident  Residual symptoms: None  Worsened or new symptoms since last visit: No  Daily aspirin use: Yes  Hypertension controlled: Yes    TN- uses tegretol, stable control    Edema- stable per pt    GERD- PPI daily, No fevers, melena, hematemesis, or weight loss.   How many servings of fruits and vegetables do you eat daily?  2-3  On average, how many sweetened beverages do you drink each day (Examples:  soda, juice, sweet tea, etc.  Do NOT count diet or artificially sweetened beverages)?   1  How many days per week do you exercise enough to make your heart beat faster? 5  How many minutes a day do you exercise enough to make your heart beat faster? 10 - 19  How many days per week do you miss taking your medication? 0        Review of Systems  Constitutional, HEENT, cardiovascular, pulmonary, gi and gu systems are negative, except as otherwise noted.      Objective    BP (!) 154/60 (BP Location: Right arm, Patient Position: Sitting, Cuff Size: Adult Large)   Pulse 72   Temp 97.3  F (36.3  C) (Temporal)   Wt (!) 154.2 kg (340 lb)   SpO2 93%   BMI 44.86 kg/m    Body mass index is 44.86 kg/m .  Physical Exam   GENERAL: alert and no distress  EYES: Eyes grossly normal to inspection, PERRL and conjunctivae and sclerae normal  HENT: ear canals and TM's normal, nose and mouth without ulcers or lesions  NECK: no adenopathy, no asymmetry, masses, or scars  RESP: lungs clear to auscultation - no rales, rhonchi or wheezes  CV: regular rate and rhythm, normal S1 S2, no S3 or S4, no murmur, click or rub,  trace to 1+ peripheral edema  ABDOMEN: soft, nontender, no hepatosplenomegaly, no masses and bowel sounds normal  MS: no gross musculoskeletal defects noted, no edema  PSYCH: mentation appears normal, affect normal/bright    Results for orders placed or performed in visit on 02/24/25 (from the past 24 hours)   HEMOGLOBIN A1C (BFP)   Result Value Ref Range    Hemoglobin A1C 7.2 (A) 4 - 5.6 %           Signed Electronically by: Josafat Marroquin MD

## 2025-04-01 DIAGNOSIS — E87.1 HYPONATREMIA: ICD-10-CM

## 2025-04-01 DIAGNOSIS — E87.1 HYPOSMOLALITY SYNDROME: Primary | ICD-10-CM

## 2025-04-01 LAB
BUN SERPL-MCNC: 13 MG/DL (ref 7–25)
BUN/CREATININE RATIO: 14 (ref 6–32)
CALCIUM SERPL-MCNC: 9.1 MG/DL (ref 8.6–10.3)
CHLORIDE SERPLBLD-SCNC: 101.3 MMOL/L (ref 98–110)
CO2 SERPL-SCNC: 28.5 MMOL/L (ref 20–32)
CREAT SERPL-MCNC: 0.95 MG/DL (ref 0.6–1.3)
GLUCOSE SERPL-MCNC: 166 MG/DL (ref 60–99)
POTASSIUM SERPL-SCNC: 4.85 MMOL/L (ref 3.5–5.3)
SODIUM SERPL-SCNC: 135.3 MMOL/L (ref 135–146)

## 2025-04-01 PROCEDURE — 80048 BASIC METABOLIC PNL TOTAL CA: CPT | Performed by: FAMILY MEDICINE

## 2025-04-01 PROCEDURE — 36415 COLL VENOUS BLD VENIPUNCTURE: CPT | Performed by: FAMILY MEDICINE

## 2025-04-01 NOTE — NURSING NOTE
Lab only non fasting  lab only non fasting      Order in file  [facility]  Tamiko Cooney   Phone [phone]     Fax [phone]     Will fax when results are back

## 2025-04-12 ENCOUNTER — HEALTH MAINTENANCE LETTER (OUTPATIENT)
Age: 80
End: 2025-04-12

## 2025-05-01 DIAGNOSIS — E22.2 SIADH (SYNDROME OF INAPPROPRIATE ADH PRODUCTION): Primary | ICD-10-CM

## 2025-05-06 ENCOUNTER — MYC MEDICAL ADVICE (OUTPATIENT)
Dept: FAMILY MEDICINE | Facility: CLINIC | Age: 80
End: 2025-05-06

## 2025-05-06 DIAGNOSIS — R35.0 BENIGN PROSTATIC HYPERPLASIA WITH URINARY FREQUENCY: ICD-10-CM

## 2025-05-06 DIAGNOSIS — N40.1 BENIGN PROSTATIC HYPERPLASIA WITH URINARY FREQUENCY: ICD-10-CM

## 2025-05-06 RX ORDER — FINASTERIDE 5 MG/1
1 TABLET, FILM COATED ORAL DAILY
Qty: 90 TABLET | Refills: 1 | Status: SHIPPED | OUTPATIENT
Start: 2025-05-06

## 2025-05-06 NOTE — TELEPHONE ENCOUNTER
Jovi Aldana is requesting a refill of:    Pending Prescriptions:                       Disp   Refills    finasteride (PROSCAR) 5 MG tablet         90 tab*0            Sig: Take 1 tablet (5 mg) by mouth daily.     refill finasteride (rx drug management), primary care provider can take over further refills  - return as needed if worsening symptoms or concerning rise in PSA     Irwin Cho MD   [facility]  [facility] Phone: [phone]

## 2025-05-25 DIAGNOSIS — R60.9 EDEMA, UNSPECIFIED TYPE: ICD-10-CM

## 2025-05-27 ENCOUNTER — RESULTS FOLLOW-UP (OUTPATIENT)
Dept: FAMILY MEDICINE | Facility: CLINIC | Age: 80
End: 2025-05-27

## 2025-05-27 RX ORDER — FUROSEMIDE 20 MG/1
10 TABLET ORAL
Qty: 90 TABLET | Refills: 0 | Status: SHIPPED | OUTPATIENT
Start: 2025-05-27

## 2025-05-27 NOTE — TELEPHONE ENCOUNTER
Pending Prescriptions:                       Disp   Refills    furosemide (LASIX) 20 MG tablet [Pharmacy*                    Sig: TAKE ONE-HALF TABLET BY MOUTH 2 TIMES DAILY.    Last ov was 2-24-25 rtc in 6 months  Rx was for 90 days  Please review fax change qty or advise  Alpa

## 2025-06-24 DIAGNOSIS — E87.1 HYPONATREMIA: Primary | ICD-10-CM

## 2025-06-24 LAB
BUN SERPL-MCNC: 15 MG/DL (ref 7–25)
BUN/CREATININE RATIO: 19 (ref 6–32)
CALCIUM SERPL-MCNC: 8.9 MG/DL (ref 8.6–10.3)
CHLORIDE SERPLBLD-SCNC: 99 MMOL/L (ref 98–110)
CO2 SERPL-SCNC: 29.6 MMOL/L (ref 20–32)
CREAT SERPL-MCNC: 0.79 MG/DL (ref 0.6–1.3)
GLUCOSE SERPL-MCNC: 187 MG/DL (ref 60–99)
POTASSIUM SERPL-SCNC: 4.89 MMOL/L (ref 3.5–5.3)
SODIUM SERPL-SCNC: 135.4 MMOL/L (ref 135–146)

## 2025-06-24 PROCEDURE — 80048 BASIC METABOLIC PNL TOTAL CA: CPT | Performed by: FAMILY MEDICINE

## 2025-06-24 PROCEDURE — 36415 COLL VENOUS BLD VENIPUNCTURE: CPT | Performed by: FAMILY MEDICINE

## 2025-06-24 NOTE — NURSING NOTE
Lab only non fasting  lab only non fasting      Order in file  [facility]  Tamiko Cooney   Phone [phone]     Fax [phone]     Will fax when results are back

## 2025-06-25 ENCOUNTER — RESULTS FOLLOW-UP (OUTPATIENT)
Dept: FAMILY MEDICINE | Facility: CLINIC | Age: 80
End: 2025-06-25

## 2025-08-03 DIAGNOSIS — G50.0 TRIGEMINAL NEURALGIA: ICD-10-CM

## 2025-08-04 RX ORDER — BACLOFEN 10 MG/1
10 TABLET ORAL DAILY
COMMUNITY
Start: 2025-08-04

## 2025-08-08 DIAGNOSIS — K21.00 GASTROESOPHAGEAL REFLUX DISEASE WITH ESOPHAGITIS WITHOUT HEMORRHAGE: ICD-10-CM

## 2025-08-08 DIAGNOSIS — I10 ESSENTIAL HYPERTENSION: ICD-10-CM

## 2025-08-08 DIAGNOSIS — E11.8 TYPE 2 DIABETES MELLITUS WITH COMPLICATION, WITHOUT LONG-TERM CURRENT USE OF INSULIN (H): ICD-10-CM

## 2025-08-10 RX ORDER — AMLODIPINE BESYLATE 2.5 MG/1
2.5 TABLET ORAL DAILY
Qty: 90 TABLET | Refills: 0 | Status: SHIPPED | OUTPATIENT
Start: 2025-08-10

## 2025-08-10 RX ORDER — OMEPRAZOLE 40 MG/1
40 CAPSULE, DELAYED RELEASE ORAL 2 TIMES DAILY
Qty: 180 CAPSULE | Refills: 0 | Status: SHIPPED | OUTPATIENT
Start: 2025-08-10

## 2025-08-10 RX ORDER — SITAGLIPTIN AND METFORMIN HYDROCHLORIDE 1000; 50 MG/1; MG/1
1 TABLET, FILM COATED ORAL 2 TIMES DAILY WITH MEALS
Qty: 180 TABLET | Refills: 0 | Status: SHIPPED | OUTPATIENT
Start: 2025-08-10

## 2025-08-10 RX ORDER — LISINOPRIL 40 MG/1
40 TABLET ORAL DAILY
Qty: 90 TABLET | Refills: 0 | Status: SHIPPED | OUTPATIENT
Start: 2025-08-10

## 2025-08-10 RX ORDER — CLONIDINE HYDROCHLORIDE 0.1 MG/1
TABLET ORAL
Qty: 270 TABLET | Refills: 0 | Status: SHIPPED | OUTPATIENT
Start: 2025-08-10

## 2025-08-15 DIAGNOSIS — E11.8 TYPE 2 DIABETES MELLITUS WITH COMPLICATION, WITHOUT LONG-TERM CURRENT USE OF INSULIN (H): ICD-10-CM

## 2025-08-15 DIAGNOSIS — G50.0 TRIGEMINAL NEURALGIA: ICD-10-CM

## 2025-08-18 RX ORDER — CARBAMAZEPINE 200 MG/1
TABLET, EXTENDED RELEASE ORAL
Qty: 630 TABLET | Refills: 0 | Status: SHIPPED | OUTPATIENT
Start: 2025-08-18

## 2025-08-18 RX ORDER — DAPAGLIFLOZIN 10 MG/1
10 TABLET, FILM COATED ORAL DAILY
Qty: 90 TABLET | Refills: 1 | Status: SHIPPED | OUTPATIENT
Start: 2025-08-18

## 2025-08-25 DIAGNOSIS — R60.9 EDEMA, UNSPECIFIED TYPE: ICD-10-CM

## 2025-08-25 RX ORDER — FUROSEMIDE 20 MG/1
10 TABLET ORAL
Qty: 90 TABLET | Refills: 0 | Status: SHIPPED | OUTPATIENT
Start: 2025-08-25

## 2025-09-04 ENCOUNTER — OFFICE VISIT (OUTPATIENT)
Dept: FAMILY MEDICINE | Facility: CLINIC | Age: 80
End: 2025-09-04

## 2025-09-04 VITALS
BODY MASS INDEX: 41.75 KG/M2 | TEMPERATURE: 97.6 F | WEIGHT: 315 LBS | HEIGHT: 73 IN | DIASTOLIC BLOOD PRESSURE: 60 MMHG | SYSTOLIC BLOOD PRESSURE: 154 MMHG | HEART RATE: 64 BPM

## 2025-09-04 DIAGNOSIS — E66.01 MORBID OBESITY (H): ICD-10-CM

## 2025-09-04 DIAGNOSIS — R35.0 BENIGN PROSTATIC HYPERPLASIA WITH URINARY FREQUENCY: ICD-10-CM

## 2025-09-04 DIAGNOSIS — Z13.89 SCREENING FOR DIABETIC PERIPHERAL NEUROPATHY: ICD-10-CM

## 2025-09-04 DIAGNOSIS — E78.2 MIXED HYPERLIPIDEMIA: ICD-10-CM

## 2025-09-04 DIAGNOSIS — I10 ESSENTIAL HYPERTENSION: ICD-10-CM

## 2025-09-04 DIAGNOSIS — G50.0 TRIGEMINAL NEURALGIA: ICD-10-CM

## 2025-09-04 DIAGNOSIS — Z00.00 ENCOUNTER FOR ANNUAL WELLNESS EXAM IN MEDICARE PATIENT: ICD-10-CM

## 2025-09-04 DIAGNOSIS — R53.83 FATIGUE, UNSPECIFIED TYPE: ICD-10-CM

## 2025-09-04 DIAGNOSIS — N40.1 BENIGN PROSTATIC HYPERPLASIA WITH URINARY FREQUENCY: ICD-10-CM

## 2025-09-04 DIAGNOSIS — E22.2 SYNDROME OF INAPPROPRIATE VASOPRESSIN SECRETION: ICD-10-CM

## 2025-09-04 DIAGNOSIS — E11.8 TYPE 2 DIABETES MELLITUS WITH COMPLICATION, WITHOUT LONG-TERM CURRENT USE OF INSULIN (H): Primary | ICD-10-CM

## 2025-09-04 DIAGNOSIS — I89.0 ACQUIRED LYMPHEDEMA OF LOWER EXTREMITY: ICD-10-CM

## 2025-09-04 DIAGNOSIS — G47.33 OSA (OBSTRUCTIVE SLEEP APNEA): ICD-10-CM

## 2025-09-04 DIAGNOSIS — I63.22 CEREBROVASCULAR ACCIDENT (CVA) DUE TO OCCLUSION OF BASILAR ARTERY (H): ICD-10-CM

## 2025-09-04 DIAGNOSIS — K21.00 GASTROESOPHAGEAL REFLUX DISEASE WITH ESOPHAGITIS WITHOUT HEMORRHAGE: ICD-10-CM

## 2025-09-04 LAB
% GRANULOCYTES: 66.5 %
ALBUMIN (URINE) MG/L: 80
ALBUMIN SERPL-MCNC: 3.7 G/DL (ref 3.6–5.1)
ALBUMIN URINE MG/G CR: ABNORMAL MG/G CREATININE
ALP SERPL-CCNC: 54 U/L (ref 33–130)
ALT 1742-6: 10 U/L (ref 0–32)
AST 1920-8: 8 U/L (ref 0–35)
BILIRUB SERPL-MCNC: 0.4 MG/DL (ref 0.2–1.2)
BUN SERPL-MCNC: 12 MG/DL (ref 7–25)
BUN/CREATININE RATIO: 14 (ref 6–32)
CALCIUM SERPL-MCNC: 9.1 MG/DL (ref 8.6–10.3)
CHLORIDE SERPLBLD-SCNC: 100 MMOL/L (ref 98–110)
CHOLEST SERPL-MCNC: 251 MG/DL (ref 0–199)
CHOLEST/HDLC SERPL: 3 {RATIO} (ref 0–5)
CO2 SERPL-SCNC: 31.5 MMOL/L (ref 20–32)
CREAT SERPL-MCNC: 0.87 MG/DL (ref 0.6–1.3)
CREATININE URINE MG/DL: 50 MG/DL
GLUCOSE SERPL-MCNC: 147 MG/DL (ref 60–99)
HCT VFR BLD AUTO: 36.6 % (ref 40–53)
HDLC SERPL-MCNC: 80 MG/DL (ref 40–150)
HEMOGLOBIN A1C: 7.2 % (ref 4–5.6)
HEMOGLOBIN: 11.7 G/DL (ref 13.3–17.7)
LDLC SERPL CALC-MCNC: 145 MG/DL (ref 0–129)
LYMPHOCYTES NFR BLD AUTO: 34.1 %
MCH RBC QN AUTO: 29.3 PG (ref 26–33)
MCHC RBC AUTO-ENTMCNC: 32 G/DL (ref 31–36)
MCV RBC AUTO: 91.4 FL (ref 78–100)
MONOCYTES NFR BLD AUTO: 9.4 %
PLATELET COUNT - QUEST: 279 10^9/L (ref 150–375)
POTASSIUM SERPL-SCNC: 5.19 MMOL/L (ref 3.5–5.3)
PROT SERPL-MCNC: 6.7 G/DL (ref 6.1–8.1)
RBC # BLD AUTO: 4 10*12/L (ref 4.4–5.9)
SODIUM SERPL-SCNC: 139.1 MMOL/L (ref 135–146)
TRIGL SERPL-MCNC: 129 MG/DL (ref 0–149)
WBC # BLD AUTO: 5.1 10*9/L (ref 4–11)

## 2025-09-04 RX ORDER — PRAZOSIN HYDROCHLORIDE 1 MG/1
1 CAPSULE ORAL 2 TIMES DAILY
Qty: 180 CAPSULE | Refills: 1 | Status: SHIPPED | OUTPATIENT
Start: 2025-09-04

## 2025-09-04 RX ORDER — BACLOFEN 10 MG/1
10 TABLET ORAL DAILY
Qty: 180 TABLET | Refills: 1 | Status: SHIPPED | OUTPATIENT
Start: 2025-09-04

## 2025-09-04 RX ORDER — DESONIDE 0.5 MG/G
CREAM TOPICAL 2 TIMES DAILY
COMMUNITY
Start: 2025-04-28

## 2025-09-04 RX ORDER — GABAPENTIN 300 MG/1
300 CAPSULE ORAL 4 TIMES DAILY
Qty: 360 CAPSULE | Refills: 1 | Status: SHIPPED | OUTPATIENT
Start: 2025-09-04

## 2025-09-04 RX ORDER — TRIAMCINOLONE ACETONIDE 1 MG/G
CREAM TOPICAL 2 TIMES DAILY
COMMUNITY

## 2025-09-04 RX ORDER — FINASTERIDE 5 MG/1
1 TABLET, FILM COATED ORAL DAILY
Qty: 90 TABLET | Refills: 1 | Status: SHIPPED | OUTPATIENT
Start: 2025-09-04

## (undated) DEVICE — ENDO POUCH UNIV RETRIEVAL SYSTEM INZII 10MM CD001

## (undated) DEVICE — SU VICRYL 4-0 P-3 18" UND J494G

## (undated) DEVICE — ADH FLOSEAL W/HUMAN THROMBIN 5ML

## (undated) DEVICE — TUBING IRRIG TUR Y TYPE 96" LF 6543-01

## (undated) DEVICE — SOL WATER IRRIG 1000ML BOTTLE 2F7114

## (undated) DEVICE — LINEN FULL SHEET 5511

## (undated) DEVICE — PACK CYSTO CUSTOM RIDGES

## (undated) DEVICE — PREP POVIDONE IODINE SOLUTION 10% 120ML

## (undated) DEVICE — PAD CHUX UNDERPAD 30X36" P3036C

## (undated) DEVICE — ENDO TROCAR SLEEVE KII Z-THREADED 05X100MM CTS02

## (undated) DEVICE — ENDO TROCAR OPTICAL ACCESS KII Z-THRD 12X100MM C0R85

## (undated) DEVICE — BAG CLEAR TRASH 1.3M 39X33" P4040C

## (undated) DEVICE — SUCTION IRR STRYKERFLOW II W/TIP 250-070-520

## (undated) DEVICE — GLOVE PROTEXIS POWDER FREE 8.0 ORTHOPEDIC 2D73ET80

## (undated) DEVICE — ENDO FORCEP ENDOJAW BIOPSY 2.8MMX160CM FB-220K

## (undated) DEVICE — GOWN XLG DISP 9545

## (undated) DEVICE — PAD CHUX UNDERPAD 23X24" 7136

## (undated) DEVICE — SURGICEL HEMOSTAT 3X4" NUKNIT 1943

## (undated) DEVICE — CATH FOLEY COUDE TIEMAN 18FR 30ML LATEX 0103SI18

## (undated) DEVICE — SUCTION CANISTER MEDIVAC LINER 3000ML W/LID 65651-530

## (undated) DEVICE — LINEN TOWEL PACK X5 5464

## (undated) DEVICE — SU VICRYL 0 CT-2 CR 8X18" J727D

## (undated) DEVICE — PREP POVIDONE IODINE SCRUB 7.5% 120ML

## (undated) DEVICE — ESU ELEC BLADE 2.75" COATED/INSULATED E1455

## (undated) DEVICE — GLOVE PROTEXIS POWDER FREE SMT 7.5  2D72PT75X

## (undated) DEVICE — ESU PENCIL W/HOLSTER E2350H

## (undated) DEVICE — SOL NACL 0.9% IRRIG 3000ML BAG 2B7477

## (undated) DEVICE — LINEN HALF SHEET 5512

## (undated) DEVICE — ENDO TROCAR FIRST ENTRY KII FIOS Z-THRD 05X100MM CTF03

## (undated) DEVICE — SOL NACL 0.9% IRRIG 1000ML BOTTLE 2F7124

## (undated) DEVICE — KIT PROCEDURE W/CLEAN-A-SCOPE LINERS V2 200800

## (undated) DEVICE — CATH TRAY FOLEY SURESTEP 16FR DRAIN BAG STATOCK A899916

## (undated) DEVICE — ESU GROUND PAD ADULT W/CORD E7507

## (undated) DEVICE — Device

## (undated) DEVICE — PACK MINOR CUSTOM RIDGES SBA32RMRMA

## (undated) DEVICE — PREP CHLORAPREP W/ORANGE TINT 10.5ML 260715

## (undated) DEVICE — SOL WATER IRRIG 3000ML BAG 2B7117

## (undated) DEVICE — ESU CORD MONOPOLAR 10'  E0510

## (undated) DEVICE — TUBING SUCTION 12"X1/4" N612

## (undated) DEVICE — BAG RED BIOHAZARD 30.5X43" G4300XR

## (undated) DEVICE — LINEN POUCH DBL 5427

## (undated) RX ORDER — HYDROCODONE BITARTRATE AND ACETAMINOPHEN 5; 325 MG/1; MG/1
TABLET ORAL
Status: DISPENSED
Start: 2020-08-11

## (undated) RX ORDER — FENTANYL CITRATE 50 UG/ML
INJECTION, SOLUTION INTRAMUSCULAR; INTRAVENOUS
Status: DISPENSED
Start: 2020-08-11

## (undated) RX ORDER — ONDANSETRON 2 MG/ML
INJECTION INTRAMUSCULAR; INTRAVENOUS
Status: DISPENSED
Start: 2020-08-11

## (undated) RX ORDER — KETAMINE HYDROCHLORIDE 10 MG/ML
INJECTION INTRAMUSCULAR; INTRAVENOUS
Status: DISPENSED
Start: 2018-05-02

## (undated) RX ORDER — ONDANSETRON 2 MG/ML
INJECTION INTRAMUSCULAR; INTRAVENOUS
Status: DISPENSED
Start: 2018-05-02

## (undated) RX ORDER — FENTANYL CITRATE 50 UG/ML
INJECTION, SOLUTION INTRAMUSCULAR; INTRAVENOUS
Status: DISPENSED
Start: 2018-05-02

## (undated) RX ORDER — PROPOFOL 10 MG/ML
INJECTION, EMULSION INTRAVENOUS
Status: DISPENSED
Start: 2020-08-11

## (undated) RX ORDER — PROPOFOL 10 MG/ML
INJECTION, EMULSION INTRAVENOUS
Status: DISPENSED
Start: 2018-05-02

## (undated) RX ORDER — NEOSTIGMINE METHYLSULFATE 1 MG/ML
VIAL (ML) INJECTION
Status: DISPENSED
Start: 2020-08-11

## (undated) RX ORDER — BUPIVACAINE HYDROCHLORIDE AND EPINEPHRINE 5; 5 MG/ML; UG/ML
INJECTION, SOLUTION EPIDURAL; INTRACAUDAL; PERINEURAL
Status: DISPENSED
Start: 2020-08-11

## (undated) RX ORDER — GLYCOPYRROLATE 0.2 MG/ML
INJECTION INTRAMUSCULAR; INTRAVENOUS
Status: DISPENSED
Start: 2020-08-11

## (undated) RX ORDER — LIDOCAINE HYDROCHLORIDE 10 MG/ML
INJECTION, SOLUTION EPIDURAL; INFILTRATION; INTRACAUDAL; PERINEURAL
Status: DISPENSED
Start: 2020-08-11

## (undated) RX ORDER — FENTANYL CITRATE-0.9 % NACL/PF 10 MCG/ML
PLASTIC BAG, INJECTION (ML) INTRAVENOUS
Status: DISPENSED
Start: 2020-08-11

## (undated) RX ORDER — CEFAZOLIN SODIUM IN 0.9 % NACL 3 G/100 ML
INTRAVENOUS SOLUTION, PIGGYBACK (ML) INTRAVENOUS
Status: DISPENSED
Start: 2020-08-11

## (undated) RX ORDER — DEXAMETHASONE SODIUM PHOSPHATE 4 MG/ML
INJECTION, SOLUTION INTRA-ARTICULAR; INTRALESIONAL; INTRAMUSCULAR; INTRAVENOUS; SOFT TISSUE
Status: DISPENSED
Start: 2020-08-11

## (undated) RX ORDER — EPHEDRINE SULFATE 50 MG/ML
INJECTION, SOLUTION INTRAMUSCULAR; INTRAVENOUS; SUBCUTANEOUS
Status: DISPENSED
Start: 2020-08-11